# Patient Record
Sex: FEMALE | Race: WHITE | NOT HISPANIC OR LATINO | Employment: OTHER | URBAN - METROPOLITAN AREA
[De-identification: names, ages, dates, MRNs, and addresses within clinical notes are randomized per-mention and may not be internally consistent; named-entity substitution may affect disease eponyms.]

---

## 2017-04-17 ENCOUNTER — HISTORICAL (OUTPATIENT)
Dept: ADMINISTRATIVE | Facility: HOSPITAL | Age: 37
End: 2017-04-17

## 2017-04-17 LAB
% SATURATION: 12 %
ALBUMIN SERPL-MCNC: 3.8 G/DL (ref 3.1–4.7)
ALP SERPL-CCNC: 45 IU/L (ref 40–104)
ALT (SGPT): 12 IU/L (ref 3–33)
AST SERPL-CCNC: 18 IU/L (ref 10–40)
BASOPHILS NFR BLD: 0 K/UL (ref 0–0.2)
BASOPHILS NFR BLD: 0.8 %
BILIRUB SERPL-MCNC: 0.4 MG/DL (ref 0.3–1)
BUN SERPL-MCNC: 11 MG/DL (ref 8–20)
CALCIUM SERPL-MCNC: 9 MG/DL (ref 7.7–10.4)
CHLORIDE: 108 MMOL/L (ref 98–110)
CO2 SERPL-SCNC: 24.7 MMOL/L (ref 22.8–31.6)
CREATININE: 0.82 MG/DL (ref 0.6–1.4)
EOSINOPHIL NFR BLD: 0 K/UL (ref 0–0.7)
EOSINOPHIL NFR BLD: 1.1 %
ERYTHROCYTE [DISTWIDTH] IN BLOOD BY AUTOMATED COUNT: 16 % (ref 12.5–14.5)
FERRITIN SERPL-MCNC: 3 NG/ML (ref 24–162)
FOLATE SERPL-MCNC: 13.6 NG/ML (ref 2.2–11.2)
GLUCOSE: 94 MG/DL (ref 70–99)
GRAN #: 1.4 K/UL (ref 1.4–6.5)
GRAN%: 38.4 %
HCT VFR BLD AUTO: 40.5 % (ref 36–48)
HGB BLD-MCNC: 12.9 G/DL (ref 12–15)
IMMATURE GRANS (ABS): 0 K/UL (ref 0–1)
IMMATURE GRANULOCYTES: 0 %
IRON: 45 MCG/DL (ref 24–162)
LYMPH #: 1.8 K/UL (ref 1.2–3.4)
LYMPH%: 48.5 %
MCH RBC QN AUTO: 27.9 PG (ref 25–35)
MCHC RBC AUTO-ENTMCNC: 31.9 G/DL (ref 31–36)
MCV RBC AUTO: 87.5 FL (ref 79–98)
MONO #: 0.4 K/UL (ref 0.1–0.6)
MONO%: 11.2 %
NUCLEATED RBCS: 0 %
NUCLEATED RED BLOOD CELLS: 0 /100 WBC
PERFORMED BY:: ABNORMAL
PLATELET # BLD AUTO: 258 K/UL (ref 140–440)
PMV BLD AUTO: 8.9 FL (ref 8.8–12.7)
POTASSIUM SERPL-SCNC: 4.9 MMOL/L (ref 3.5–5)
PROT SERPL-MCNC: 7.1 G/DL (ref 6–8.2)
RBC # BLD AUTO: 4.63 M/UL (ref 3.5–5.5)
SODIUM: 140 MMOL/L (ref 134–144)
TOTAL IRON BINDING CAPACITY: 371 MCG/DL (ref 177–435)
VITAMIN B12: >1500 PG/ML (ref 62–940)
VITAMIN D, 1,25 (OH)2: 68.8 NG/ML (ref 30–100)
WBC # BLD: 3.7 K/UL (ref 5–10)

## 2017-08-10 ENCOUNTER — OFFICE VISIT (OUTPATIENT)
Dept: PULMONOLOGY | Facility: CLINIC | Age: 37
End: 2017-08-10
Payer: MEDICAID

## 2017-08-10 VITALS
WEIGHT: 106 LBS | DIASTOLIC BLOOD PRESSURE: 64 MMHG | BODY MASS INDEX: 17.66 KG/M2 | HEART RATE: 89 BPM | HEIGHT: 65 IN | SYSTOLIC BLOOD PRESSURE: 92 MMHG | OXYGEN SATURATION: 95 %

## 2017-08-10 DIAGNOSIS — R06.09 DYSPNEA ON EXERTION: ICD-10-CM

## 2017-08-10 DIAGNOSIS — I26.99 OTHER PULMONARY EMBOLISM WITHOUT ACUTE COR PULMONALE, UNSPECIFIED CHRONICITY: Primary | ICD-10-CM

## 2017-08-10 DIAGNOSIS — R68.3 CLUBBING OF FINGERS: ICD-10-CM

## 2017-08-10 DIAGNOSIS — E46 MALNUTRITION: ICD-10-CM

## 2017-08-10 PROBLEM — N80.9 ENDOMETRIOSIS: Status: ACTIVE | Noted: 2017-08-10

## 2017-08-10 PROBLEM — I05.9 MITRAL VALVE DISORDER: Status: ACTIVE | Noted: 2017-08-10

## 2017-08-10 PROBLEM — K85.90 PANCREATITIS: Status: ACTIVE | Noted: 2017-08-10

## 2017-08-10 PROBLEM — R10.9 CHRONIC ABDOMINAL PAIN: Status: ACTIVE | Noted: 2017-08-10

## 2017-08-10 PROBLEM — D50.9 IRON DEFICIENCY ANEMIA: Status: ACTIVE | Noted: 2017-08-10

## 2017-08-10 PROBLEM — M81.0 OP (OSTEOPOROSIS): Status: ACTIVE | Noted: 2017-08-10

## 2017-08-10 PROBLEM — Z78.9 NON-SMOKER: Status: ACTIVE | Noted: 2017-08-10

## 2017-08-10 PROBLEM — G89.29 CHRONIC ABDOMINAL PAIN: Status: ACTIVE | Noted: 2017-08-10

## 2017-08-10 LAB
ALBUMIN SERPL-MCNC: 3.8 G/DL (ref 3.1–4.7)
ALP SERPL-CCNC: 37 IU/L (ref 40–104)
ALT (SGPT): 15 IU/L (ref 3–33)
AST SERPL-CCNC: 17 IU/L (ref 10–40)
BASOPHILS NFR BLD: 0 K/UL (ref 0–0.2)
BASOPHILS NFR BLD: 0.6 %
BILIRUB SERPL-MCNC: 0.8 MG/DL (ref 0.3–1)
BUN SERPL-MCNC: 12 MG/DL (ref 8–20)
CALCIUM SERPL-MCNC: 8.8 MG/DL (ref 7.7–10.4)
CHLORIDE: 105 MMOL/L (ref 98–110)
CO2 SERPL-SCNC: 24.4 MMOL/L (ref 22.8–31.6)
CREATININE: 0.85 MG/DL (ref 0.6–1.4)
EOSINOPHIL NFR BLD: 0 K/UL (ref 0–0.7)
EOSINOPHIL NFR BLD: 0.4 %
ERYTHROCYTE [DISTWIDTH] IN BLOOD BY AUTOMATED COUNT: 14 % (ref 11.7–14.9)
GLUCOSE: 97 MG/DL (ref 70–99)
GRAN #: 2.4 K/UL (ref 1.4–6.5)
GRAN%: 50 %
HCT VFR BLD AUTO: 39.1 % (ref 36–48)
HGB BLD-MCNC: 13.1 G/DL (ref 12–15)
IMMATURE GRANS (ABS): 0 K/UL (ref 0–1)
IMMATURE GRANULOCYTES: 0.2 %
LYMPH #: 2 K/UL (ref 1.2–3.4)
LYMPH%: 40.5 %
MCH RBC QN AUTO: 30.1 PG (ref 25–35)
MCHC RBC AUTO-ENTMCNC: 33.5 G/DL (ref 31–36)
MCV RBC AUTO: 89.9 FL (ref 79–98)
MONO #: 0.4 K/UL (ref 0.1–0.6)
MONO%: 8.3 %
NUCLEATED RBCS: 0 %
PLATELET # BLD AUTO: 228 K/UL (ref 140–440)
PMV BLD AUTO: 9.9 FL (ref 8.8–12.7)
POTASSIUM SERPL-SCNC: 3.7 MMOL/L (ref 3.5–5)
PROT SERPL-MCNC: 6.9 G/DL (ref 6–8.2)
RBC # BLD AUTO: 4.35 M/UL (ref 3.5–5.5)
SODIUM: 137 MMOL/L (ref 134–144)
VITAMIN D, 1,25 (OH)2: 75.4 NG/ML (ref 30–100)
WBC # BLD AUTO: 4.8 K/UL (ref 5–10)

## 2017-08-10 PROCEDURE — 3008F BODY MASS INDEX DOCD: CPT | Mod: ,,, | Performed by: INTERNAL MEDICINE

## 2017-08-10 PROCEDURE — 99204 OFFICE O/P NEW MOD 45 MIN: CPT | Mod: ,,, | Performed by: INTERNAL MEDICINE

## 2017-08-10 RX ORDER — TRAZODONE HYDROCHLORIDE 150 MG/1
300 TABLET ORAL NIGHTLY
Refills: 0 | COMMUNITY
Start: 2017-07-10 | End: 2021-05-12

## 2017-08-10 RX ORDER — NORETHINDRONE ACETATE AND ETHINYL ESTRADIOL AND FERROUS FUMARATE 1.5-30(21)
KIT ORAL
Refills: 12 | COMMUNITY
Start: 2017-07-07 | End: 2018-05-10 | Stop reason: ALTCHOICE

## 2017-08-10 RX ORDER — APIXABAN 5 MG/1
TABLET, FILM COATED ORAL
Refills: 2 | COMMUNITY
Start: 2017-07-16 | End: 2019-05-02 | Stop reason: SDUPTHER

## 2017-08-10 NOTE — PROGRESS NOTES
New Office Visit/Consultation Note    Patient Name: Janeth Siddiqi  MRN: [id number]  : 1980      Reason for visit: Dyspnea, chest pain, PE, clubbing    HPI:     38 yo female with long standing  Medical issues including malabsorption (h/o vit D deficiency, fat malabsorption, B12 deficiency), recurrent bowel obstruction (has had 2 surgeries), had PE about 1 year ago (had protein C deficiency, heterozygote for MTHFR), + clubbing (started in her teenage years now worse).  Here for evaluation of some persistent dyspnea and some right sided chest pain.  ROS as below.    Past Medical History  Past Medical History:   Diagnosis Date    Anxiety     Bipolar disorder     Eating disorder     Pulmonary embolism        Past Surgical History  Past Surgical History:   Procedure Laterality Date    APPENDECTOMY      bowel obstruction      lysis of adhesions      OVARIAN CYST REMOVAL      uterine polyp removal         Medications  Current Outpatient Prescriptions on File Prior to Visit   Medication Sig Dispense Refill    cyanocobalamin, vitamin B-12, (B-12 COMPLIANCE) 1,000 mcg/mL Kit Inject 1,000 mcg as directed every 28 days.      ferrous sulfate 324 mg (65 mg iron) TbEC Take 325 mg by mouth once daily.      fluoxetine (PROZAC) 20 MG capsule Take 20 mg by mouth once daily.      lubiprostone (AMITIZA) 24 MCG Cap Take 24 mcg by mouth daily with breakfast.      multivitamin capsule Take 1 capsule by mouth once daily.      promethazine (PHENERGAN) 25 MG suppository Place 1 suppository (25 mg total) rectally every 6 (six) hours as needed for Nausea. 10 suppository 0    topiramate (TOPAMAX) 100 MG tablet Take 1 tablet by mouth 3 (three) times daily.  1    [DISCONTINUED] LOW-OGESTREL, 28, 0.3-30 mg-mcg per tablet Take 1 tablet by mouth once daily.  5    [DISCONTINUED] docusate sodium (COLACE) 100 MG capsule Take 100 mg by mouth 2 (two) times daily.      [DISCONTINUED] promethazine (PHENERGAN) 25 MG tablet Take 1  "tablet (25 mg total) by mouth every 6 (six) hours as needed for Nausea. 15 tablet 0     No current facility-administered medications on file prior to visit.        Allergies  Review of patient's allergies indicates:   Allergen Reactions    Iron analogues Anaphylaxis     Infusion only. Tolerates po    Bactrim [sulfamethoxazole-trimethoprim] Swelling    Lidocaine Swelling    Nsaids (non-steroidal anti-inflammatory drug)      cannot take NSAIDs- upsets the stomach    Zofran [ondansetron hcl (pf)] Nausea Only       Review of Systems  Review of Systems   Constitutional: Positive for malaise/fatigue and weight loss. Negative for chills, diaphoresis and fever.   HENT: Negative for congestion.    Eyes: Negative for pain.   Respiratory: Positive for cough and shortness of breath. Negative for hemoptysis, sputum production, wheezing and stridor.         + clubbing   Cardiovascular: Positive for chest pain. Negative for palpitations, orthopnea, claudication, leg swelling and PND.   Gastrointestinal: Positive for abdominal pain, constipation and diarrhea. Negative for heartburn, nausea and vomiting.        Malabsorption   Genitourinary: Negative for dysuria, frequency and urgency.   Musculoskeletal: Negative for falls and myalgias.   Skin: Negative for itching and rash.   Neurological: Positive for weakness. Negative for sensory change and focal weakness.   Psychiatric/Behavioral: Positive for depression. The patient is not nervous/anxious.        Physical Exam    Vitals:    08/10/17 1340   BP: 92/64   Pulse: 89   SpO2: 95%   Weight: 48.1 kg (106 lb)   Height: 5' 5" (1.651 m)       Physical Exam   Constitutional: She is oriented to person, place, and time. She appears well-developed. No distress.   Thin, chronically ill appearing   HENT:   Head: Normocephalic and atraumatic.   Right Ear: External ear normal.   Left Ear: External ear normal.   Nose: Nose normal.   Mouth/Throat: Oropharynx is clear and moist.   Eyes: " Conjunctivae and EOM are normal. Pupils are equal, round, and reactive to light.   Neck: Normal range of motion. Neck supple. No JVD present. No tracheal deviation present. No thyromegaly present.   Cardiovascular: Normal rate, regular rhythm, normal heart sounds and intact distal pulses.  Exam reveals no gallop and no friction rub.    No murmur heard.  Pulmonary/Chest: Effort normal and breath sounds normal. No stridor. No respiratory distress. She has no wheezes. She has no rales. She exhibits no tenderness.   Abdominal: Soft. Bowel sounds are normal. She exhibits no distension. There is no tenderness.   scaphoid   Musculoskeletal: Normal range of motion. She exhibits no edema or tenderness.   + clubbing   Lymphadenopathy:     She has no cervical adenopathy.   Neurological: She is alert and oriented to person, place, and time. No cranial nerve deficit.   Skin: Skin is warm and dry. She is not diaphoretic.   Psychiatric: She has a normal mood and affect. Her behavior is normal.   Nursing note and vitals reviewed.      Labs      Xrays    Imaging Results    None         Impression/Plan    Problem List Items Addressed This Visit        Hematology    Pulmonary embolism - Primary  - has protein C deficiency  - continue Eliquis  - d/w Dr Hurst  - heterozygote for MTHFR    Relevant Orders    CT Chest Without Contrast       Endocrine    Malnutrition  - has h/o malabsorption of fats with vitamin deficiencies  - check labs  - check vit ADEK    Relevant Orders    Comprehensive metabolic panel    Vitamin D    Vitamin A, E, Beta Carotene    Vitamin K       Other    Clubbing of fingers  - check PFT  - recheck CT scan  - check cystic fibrosis screen    Relevant Orders    Cystic Fibrosis Mutation Panel    Dyspnea on exertion  - as above  - RTC 1 month    Relevant Orders    Complete PFT with bronchodilator    CBC auto differential       I am concerned that she has a more systemic problem, will start with workup as above and  move forward from there.  This was discussed with her and her family at length and all questions answered.   Other Visit Diagnoses    None.         Time spent with patient: 45 minutes

## 2017-08-14 ENCOUNTER — HISTORICAL (OUTPATIENT)
Dept: ADMINISTRATIVE | Facility: HOSPITAL | Age: 37
End: 2017-08-14

## 2017-08-14 ENCOUNTER — TELEPHONE (OUTPATIENT)
Dept: HEMATOLOGY/ONCOLOGY | Facility: CLINIC | Age: 37
End: 2017-08-14

## 2017-08-14 DIAGNOSIS — D50.9 IRON DEFICIENCY ANEMIA, UNSPECIFIED IRON DEFICIENCY ANEMIA TYPE: Primary | ICD-10-CM

## 2017-08-14 LAB
ALBUMIN SERPL-MCNC: 3.6 G/DL (ref 3.1–4.7)
ALP SERPL-CCNC: 32 IU/L (ref 40–104)
ALT (SGPT): 14 IU/L (ref 3–33)
AST SERPL-CCNC: 17 IU/L (ref 10–40)
BASOPHILS NFR BLD: 0 K/UL (ref 0–0.2)
BASOPHILS NFR BLD: 0.5 %
BILIRUB SERPL-MCNC: 0.7 MG/DL (ref 0.3–1)
BUN SERPL-MCNC: 11 MG/DL (ref 8–20)
CALCIUM SERPL-MCNC: 8.4 MG/DL (ref 7.7–10.4)
CHLORIDE: 106 MMOL/L (ref 98–110)
CO2 SERPL-SCNC: 26.2 MMOL/L (ref 22.8–31.6)
CREATININE: 0.75 MG/DL (ref 0.6–1.4)
EOSINOPHIL NFR BLD: 0 K/UL (ref 0–0.7)
EOSINOPHIL NFR BLD: 0.5 %
ERYTHROCYTE [DISTWIDTH] IN BLOOD BY AUTOMATED COUNT: 13.9 % (ref 12.5–14.5)
FERRITIN SERPL-MCNC: 19 NG/ML (ref 24–162)
FOLATE SERPL-MCNC: 21.4 NG/ML (ref 2.2–11.2)
GLUCOSE: 89 MG/DL (ref 70–99)
GRAN #: 2.2 K/UL (ref 1.4–6.5)
GRAN%: 49.7 %
HCT VFR BLD AUTO: 36.6 % (ref 36–48)
HGB BLD-MCNC: 12.3 G/DL (ref 12–15)
IMMATURE GRANS (ABS): 0 K/UL (ref 0–1)
IMMATURE GRANULOCYTES: 0.2 %
LYMPH #: 1.8 K/UL (ref 1.2–3.4)
LYMPH%: 40.2 %
MCH RBC QN AUTO: 29.9 PG (ref 25–35)
MCHC RBC AUTO-ENTMCNC: 33.6 G/DL (ref 31–36)
MCV RBC AUTO: 88.8 FL (ref 79–98)
MONO #: 0.4 K/UL (ref 0.1–0.6)
MONO%: 8.9 %
NUCLEATED RBCS: 0 %
NUCLEATED RED BLOOD CELLS: 0 /100 WBC
PERFORMED BY:: ABNORMAL
PLATELET # BLD AUTO: 206 K/UL (ref 140–440)
PMV BLD AUTO: 9.4 FL (ref 8.8–12.7)
POTASSIUM SERPL-SCNC: 3.8 MMOL/L (ref 3.5–5)
PROT SERPL-MCNC: 6.7 G/DL (ref 6–8.2)
RBC # BLD AUTO: 4.12 M/UL (ref 3.5–5.5)
SODIUM: 138 MMOL/L (ref 134–144)
VITAMIN B12: 893 PG/ML (ref 62–940)
VITAMIN D, 1,25 (OH)2: 72.3 NG/ML (ref 30–100)
WBC # BLD: 4.4 K/UL (ref 5–10)

## 2017-08-14 NOTE — TELEPHONE ENCOUNTER
rtn call to pt left message on VM to call clinic with any questions or concerns. (requested orders for labs but looks like labs have already been drawn) would like for pt to call back if we need orders to be faxed.

## 2017-08-15 ENCOUNTER — OFFICE VISIT (OUTPATIENT)
Dept: HEMATOLOGY/ONCOLOGY | Facility: CLINIC | Age: 37
End: 2017-08-15
Payer: MEDICAID

## 2017-08-15 VITALS
HEIGHT: 65 IN | TEMPERATURE: 99 F | RESPIRATION RATE: 18 BRPM | BODY MASS INDEX: 17.83 KG/M2 | SYSTOLIC BLOOD PRESSURE: 106 MMHG | DIASTOLIC BLOOD PRESSURE: 73 MMHG | WEIGHT: 107 LBS | HEART RATE: 73 BPM

## 2017-08-15 DIAGNOSIS — D50.9 IRON DEFICIENCY ANEMIA, UNSPECIFIED IRON DEFICIENCY ANEMIA TYPE: ICD-10-CM

## 2017-08-15 DIAGNOSIS — I26.99 OTHER PULMONARY EMBOLISM WITHOUT ACUTE COR PULMONALE, UNSPECIFIED CHRONICITY: Primary | ICD-10-CM

## 2017-08-15 DIAGNOSIS — J47.9 BRONCHIECTASIS WITHOUT COMPLICATION: Primary | ICD-10-CM

## 2017-08-15 DIAGNOSIS — E53.8 B12 DEFICIENCY: ICD-10-CM

## 2017-08-15 DIAGNOSIS — E55.9 VITAMIN D DEFICIENCY: ICD-10-CM

## 2017-08-15 DIAGNOSIS — D68.59 ANTITHROMBIN III DEFICIENCY: ICD-10-CM

## 2017-08-15 DIAGNOSIS — Z15.89 MTHFR MUTATION: ICD-10-CM

## 2017-08-15 PROCEDURE — 99213 OFFICE O/P EST LOW 20 MIN: CPT | Mod: ,,, | Performed by: INTERNAL MEDICINE

## 2017-08-15 PROCEDURE — 3008F BODY MASS INDEX DOCD: CPT | Mod: ,,, | Performed by: INTERNAL MEDICINE

## 2017-08-15 NOTE — PROGRESS NOTES
[facility] Hematology/Oncology  PROGRESS NOTE      Subjective:       Patient ID:   NAME: Janeth Siddiqi : 1980     37 y.o. female    Referring Doc: Millicent Garcia  Other Physicians: Francoise Noriega    Chief Complaint:  Follow-up and Results (labs in epic)      History of Present Illness:     Patient returns today for a regularly scheduled follow-up visit.  The patient is here with her mom. She needs to establish with a new PCP since Dr Garcia moved to [facility]. She denies any CP, SOB, HA's or N/V.             ROS:   GEN: normal without any fever, night sweats or weight loss  HEENT: normal with no HA's, sore throat, stiff neck, changes in vision  CV: normal with no CP, SOB, PND, KRAUSE or orthopnea  PULM: normal with no SOB, cough, hemoptysis, sputum or pleuritic pain  GI: normal with no abdominal pain, nausea, vomiting, constipation, diarrhea, melanotic stools, BRBPR, or hematemesis  : normal with no hematuria, dysuria  BREAST: normal with no mass, discharge, pain  SKIN: normal with no rash, erythema, bruising, or swelling    Allergies:  Review of patient's allergies indicates:   Allergen Reactions    Iron analogues Anaphylaxis     Infusion only. Tolerates po    Bactrim [sulfamethoxazole-trimethoprim] Swelling    Lidocaine Swelling    Nsaids (non-steroidal anti-inflammatory drug)      cannot take NSAIDs- upsets the stomach    Zofran [ondansetron hcl (pf)] Nausea Only       Medications:    Current Outpatient Prescriptions:     cyanocobalamin, vitamin B-12, (B-12 COMPLIANCE) 1,000 mcg/mL Kit, Inject 1,000 mcg as directed every 28 days., Disp: , Rfl:     ELIQUIS 5 mg Tab, TK 1 T PO  BID, Disp: , Rfl: 2    ferrous sulfate 324 mg (65 mg iron) TbEC, Take 325 mg by mouth once daily., Disp: , Rfl:     fluoxetine (PROZAC) 20 MG capsule, Take 20 mg by mouth once daily., Disp: , Rfl:     lubiprostone (AMITIZA) 24 MCG Cap, Take 24 mcg by mouth daily with breakfast., Disp: , Rfl:     MICROGESTIN FE 1.,  "28, 1.5 mg-30 mcg (21)/75 mg (7) tablet, TK 1 T PO QD, Disp: , Rfl: 12    multivitamin capsule, Take 1 capsule by mouth once daily., Disp: , Rfl:     promethazine (PHENERGAN) 25 MG suppository, Place 1 suppository (25 mg total) rectally every 6 (six) hours as needed for Nausea., Disp: 10 suppository, Rfl: 0    topiramate (TOPAMAX) 100 MG tablet, Take 1 tablet by mouth 3 (three) times daily., Disp: , Rfl: 1    trazodone (DESYREL) 150 MG tablet, TAKE 2 TS PO QD, Disp: , Rfl: 0    PMHx/PSHx Updates:  See patient's last visit with me on 5/10/17  See H&P on 12/7/16        Pathology:  No matching staging information was found for the patient.          Objective:     Vitals:  Blood pressure 106/73, pulse 73, temperature 98.6 °F (37 °C), resp. rate 18, height 5' 5" (1.651 m), weight 48.5 kg (107 lb).    Physical Examination:   GEN: no apparent distress, comfortable; AAOx3  HEAD: atraumatic and normocephalic  EYES: no pallor, no icterus, PERRLA  ENT: OMM, no pharyngeal erythema, external ears WNL; no nasal discharge; no thrush  NECK: no masses, thyroid normal, trachea midline, no LAD/LN's, supple  CV: RRR with no murmur; normal pulse; normal S1 and S2; no pedal edema  CHEST: Normal respiratory effort; CTAB; normal breath sounds; no wheeze or crackles  ABDOM: nontender and nondistended; soft; normal bowel sounds; no rebound/guarding  MUSC/Skeletal: ROM normal; no crepitus; joints normal; no deformities or arthropathy  EXTREM: no clubbing, cyanosis, inflammation or swelling  SKIN: no rashes, lesions, ulcers, petechiae or subcutaneous nodules  : no reza  NEURO: grossly intact; motor/sensory WNL; AAOx3; no tremors  PSYCH: normal mood, affect and behavior  LYMPH: normal cervical, supraclavicular, axillary and groin LN's            Labs:     8/14/2017      Radiology/Diagnostic Studies:    X-ray Abdomen Flat And Erect    Result Date: 7/21/2017  EXAM: KUB. INDICATION: Obstruction. COMPARISON: KUB 1/1/14. FINDINGS: Supine and " upright radiographs of the abdomen demonstrate a nonobstructive bowel gas pattern. There is no free air. There is no pneumatosis. There is no portal venous gas. There is calcification. Lung bases are clear. There is no osseous abnormality.    1.Nonobstructed bowel gas pattern. Electronically signed by: KELBY DENNIS MD Date:     07/21/17 Time:    13:49     Ct Abdomen Pelvis With Contrast    Result Date: 7/21/2017  Procedure: CT of the abdomen and pelvis with IV contrast. Technique: Axial images of the abdomen and pelvis were obtained with intravenous contrast administration. Coronal and sagittal reconstructions were provided. Total DLP was 691 mGy-cm.  Dose lowering technique, automated exposure control, was utilized for this exam. History: Left-sided abdominal pain and vomiting. Comparison: CT of the abdomen and pelvis 7/31/2015. Findings: The bilateral lung bases are clear. The heart is normal in size. There is a 7 mm focus of arterial enhancement within segment 4B of the liver which is isodense to the adjacent liver on delayed phase. This most consistent with a flash filling hemangioma. The portal vein is patent. The spleen, pancreas, and adrenal glands are normal. Bilateral kidneys are normal. There is no hydronephrosis or nephrolithiasis. The stomach and small bowel are decompressed. The appendix is not visualized, however there is no right lower quadrant inflammatory stranding. The colon is normal. The uterus and adnexa are normal for age. Urinary bladder is normal. There is trace physiologic free fluid in the lower pelvis. There is no pelvic or retroperitoneal adenopathy. The abdominal aorta is non-aneurysmal. There is no lytic or blastic osseous lesions.     No acute abnormality of the abdomen and pelvis. Electronically signed by: KELBY DENNIS MD Date:     07/21/17 Time:    15:37       I have reviewed all available lab results and radiology reports.    Assessment/Plan:   (1) 37 y.o. female with diagnosis of iron  deficiency anemia and B12 deficiency  - she was previously on oral iron and monthly B12  - latest ferritin is 19  - B12 893    (2) prior Pulmonary emboli with prior use of OCP's  - on oral anticoagulant drug Eliquis  - underlying MTHFR-A heterozygous and ATIII deficiency issues  - followed by Dr Mccray with Pulm and he order several tests and is doing workup for cystic fibrosis    (3) Crohn's disorder/pancreatitis - followed by Dr Noriega with GI    (4) Chronic GYN bleeding - followed by Dr Gigi Oquendo with GYN  - latest hgb wnl  - she is on OCP's still and thus she needs to probably remain on the blood thinners    (5) prior Vit D deficiency    (6) She is being seen by Dr Khoobehi with plastics in [place] for potential Voluma skin filler; though I can not advocate her coming off the blood thinners; if she is to have the procedure, she will need to hold the Eliquis for two days before        PLAN:  1. Proceed with Dr Mccray's workup and f/u  2. F/u with PCP , GYn, and pulm  3. RTC  In 4 months  Fax note to Schuette, Striplin, Bulat, Khoobehi    Discussion:     I have explained all of the above in detail and the patient understands all of the current recommendation(s). I have answered all of their questions to the best of my ability and to their complete satisfaction.   The patient is to continue with the current management plan.            Electronically signed by Miguel Hurst MD

## 2017-09-13 ENCOUNTER — OFFICE VISIT (OUTPATIENT)
Dept: PULMONOLOGY | Facility: CLINIC | Age: 37
End: 2017-09-13
Payer: MEDICAID

## 2017-09-13 VITALS — DIASTOLIC BLOOD PRESSURE: 60 MMHG | OXYGEN SATURATION: 99 % | SYSTOLIC BLOOD PRESSURE: 92 MMHG | HEART RATE: 62 BPM

## 2017-09-13 DIAGNOSIS — D68.59 ANTITHROMBIN III DEFICIENCY: ICD-10-CM

## 2017-09-13 DIAGNOSIS — I26.99 OTHER PULMONARY EMBOLISM WITHOUT ACUTE COR PULMONALE, UNSPECIFIED CHRONICITY: Primary | ICD-10-CM

## 2017-09-13 DIAGNOSIS — Z78.9 NON-SMOKER: ICD-10-CM

## 2017-09-13 DIAGNOSIS — R06.09 DYSPNEA ON EXERTION: ICD-10-CM

## 2017-09-13 DIAGNOSIS — E46 MALNUTRITION: ICD-10-CM

## 2017-09-13 DIAGNOSIS — R68.3 CLUBBING OF FINGERS: ICD-10-CM

## 2017-09-13 PROCEDURE — 3008F BODY MASS INDEX DOCD: CPT | Mod: ,,, | Performed by: INTERNAL MEDICINE

## 2017-09-13 PROCEDURE — 99214 OFFICE O/P EST MOD 30 MIN: CPT | Mod: ,,, | Performed by: INTERNAL MEDICINE

## 2017-09-13 RX ORDER — ERGOCALCIFEROL 1.25 MG/1
50000 CAPSULE ORAL
Qty: 4 CAPSULE | Refills: 12 | Status: SHIPPED | OUTPATIENT
Start: 2017-09-13 | End: 2018-09-13 | Stop reason: SDUPTHER

## 2017-09-13 NOTE — PROGRESS NOTES
Office Visit    HPI:    Here for follow up, no new complaints, have reviewed numerous labs with pt and mother, PFT show moderate chest restriction and mild decrease in DLCO, CT chest shows no significant bronchiectasis but has ectasia of the ascending aorta (stable),  CF studies still pending (will call pt with results when available).  All questions answered.    Medications      Current Outpatient Prescriptions:     cyanocobalamin, vitamin B-12, (B-12 COMPLIANCE) 1,000 mcg/mL Kit, Inject 1,000 mcg as directed every 28 days., Disp: , Rfl:     ELIQUIS 5 mg Tab, TK 1 T PO  BID, Disp: , Rfl: 2    ferrous sulfate 324 mg (65 mg iron) TbEC, Take 325 mg by mouth once daily., Disp: , Rfl:     fluoxetine (PROZAC) 20 MG capsule, Take 20 mg by mouth once daily., Disp: , Rfl:     lubiprostone (AMITIZA) 24 MCG Cap, Take 24 mcg by mouth daily with breakfast., Disp: , Rfl:     MICROGESTIN FE 1.5/30, 28, 1.5 mg-30 mcg (21)/75 mg (7) tablet, TK 1 T PO QD, Disp: , Rfl: 12    multivitamin capsule, Take 1 capsule by mouth once daily., Disp: , Rfl:     promethazine (PHENERGAN) 25 MG suppository, Place 1 suppository (25 mg total) rectally every 6 (six) hours as needed for Nausea., Disp: 10 suppository, Rfl: 0    topiramate (TOPAMAX) 100 MG tablet, Take 1 tablet by mouth 3 (three) times daily., Disp: , Rfl: 1    trazodone (DESYREL) 150 MG tablet, TAKE 2 TS PO QD, Disp: , Rfl: 0    ergocalciferol (ERGOCALCIFEROL) 50,000 unit Cap, Take 1 capsule (50,000 Units total) by mouth every 7 days., Disp: 4 capsule, Rfl: 12    Allergies    Review of patient's allergies indicates:   Allergen Reactions    Iron analogues Anaphylaxis     Infusion only. Tolerates po    Bactrim [sulfamethoxazole-trimethoprim] Swelling    Lidocaine Swelling    Nsaids (non-steroidal anti-inflammatory drug)      cannot take NSAIDs- upsets the stomach    Zofran [ondansetron hcl (pf)] Nausea Only       Social History    History   Smoking Status    Never Smoker    Smokeless Tobacco    Never Used       ETOH - 0 drinks per week.    Drug Use - 0    Occupation - not working    Family History    Family History   Problem Relation Age of Onset    Breast cancer Mother 58    Colon cancer Maternal Grandmother 62    Prostate cancer Maternal Grandfather        ROS  Review of Systems   Constitutional: Positive for malaise/fatigue. Negative for chills, diaphoresis, fever and weight loss.   HENT: Negative for congestion.    Eyes: Negative for pain.   Respiratory: Negative for cough, hemoptysis, sputum production, shortness of breath, wheezing and stridor.    Cardiovascular: Negative for chest pain, palpitations, orthopnea, claudication, leg swelling and PND.   Gastrointestinal: Positive for constipation and nausea. Negative for abdominal pain, diarrhea, heartburn and vomiting.        Bloating   Genitourinary: Negative for dysuria, frequency and urgency.   Musculoskeletal: Negative for falls and myalgias.   Neurological: Negative for sensory change, focal weakness and weakness.   Psychiatric/Behavioral: Negative for depression. The patient is not nervous/anxious.        Physical Exam    Vitals:    09/13/17 1400   BP: 92/60   Pulse: 62   SpO2: 99%       Physical Exam   Constitutional: She is oriented to person, place, and time. She appears well-developed and well-nourished. No distress.   HENT:   Head: Normocephalic and atraumatic.   Nose: Nose normal.   Mouth/Throat: Oropharynx is clear and moist.   Eyes: Conjunctivae and EOM are normal. Pupils are equal, round, and reactive to light.   Neck: Normal range of motion. Neck supple. No JVD present. No tracheal deviation present. No thyromegaly present.   Cardiovascular: Normal rate, regular rhythm, normal heart sounds and intact distal pulses.  Exam reveals no gallop and no friction rub.    No murmur heard.  Pulmonary/Chest: Effort normal and breath sounds normal. No stridor. No respiratory distress. She has no wheezes. She has no rales.  She exhibits no tenderness.   Abdominal: Soft. Bowel sounds are normal. She exhibits no distension. There is no tenderness.   Musculoskeletal: Normal range of motion. She exhibits no edema or tenderness.   + clubbing   Lymphadenopathy:     She has no cervical adenopathy.   Neurological: She is alert and oriented to person, place, and time. No cranial nerve deficit.   Skin: Skin is warm and dry. She is not diaphoretic.   Psychiatric: She has a normal mood and affect. Her behavior is normal.   Nursing note and vitals reviewed.      Lab    @RESUFAST (WBC,HGB,HCT,PLT)@    Sodium   Date Value Ref Range Status   08/14/2017 138 134 - 144 mmol/L      Potassium   Date Value Ref Range Status   08/14/2017 3.8 3.5 - 5.0 mmol/L      Chloride   Date Value Ref Range Status   08/14/2017 106 98 - 110 mmol/L      CO2   Date Value Ref Range Status   08/14/2017 26.2 22.8 - 31.6 mmol/L      Glucose   Date Value Ref Range Status   08/14/2017 89 70 - 99 mg/dL      BUN, Bld   Date Value Ref Range Status   08/14/2017 11 8 - 20 mg/dL      Creatinine   Date Value Ref Range Status   08/14/2017 0.75 0.60 - 1.40 mg/dL      Calcium   Date Value Ref Range Status   08/14/2017 8.4 7.7 - 10.4 mg/dL      Total Protein   Date Value Ref Range Status   08/14/2017 6.7 6.0 - 8.2 g/dL      Albumin   Date Value Ref Range Status   08/14/2017 3.6 3.1 - 4.7 g/dL      Total Bilirubin   Date Value Ref Range Status   08/14/2017 0.7 0.3 - 1.0 mg/dL      Alkaline Phosphatase   Date Value Ref Range Status   08/14/2017 32 (L) 40 - 104 IU/L      AST   Date Value Ref Range Status   08/14/2017 17 10 - 40 IU/L      ALT   Date Value Ref Range Status   07/21/2017 21 10 - 44 U/L Final     Anion Gap   Date Value Ref Range Status   07/21/2017 7 (L) 8 - 16 mmol/L Final         Xray    CMS MANDATED QUALITY DATA - CT RADIATION ? 436    All CT scans at this facility utilize dose modulation, iterative reconstruction,  and/or weight based dosing when appropriate to reduce radiation  dose to as low  as reasonably achievable.    CLINICAL HISTORY:  37 years (1980) Female R06.09/I26.99 patient has a history of shortness of  breath and cystic fibrosis.    TECHNIQUE:  CHEST W/O CONTRAST CT. 577 images obtained. CT scan of the chest was performed  without intravenous contrast.    CONTRAST:  No IV contrast was administered    COMPARISON:  Radiograph the chest most recently from 12/06/2016, and CT of the chest from  08/21/2016    FINDINGS:  Evaluation of the solid organs is limited without intravenous contrast.    Visualized neck:The thyroid gland appears normal.  Lungs: The lungs are clear.  Airway: The trachea and central bronchial tree appear normal. There is no  significant bronchiectasis or bronchial wall thickening.  Pleura: There is no pleural effusion. There is no pneumothorax.  Cardiovascular: The heart is normal in size. There is no pericardial effusion.  The thoracic aorta is ectatic, measuring  3.2 cm in diameter, unchanged from  the previous exam  Mediastinum: There is no supraclavicular, axillary, mediastinal, or hilar  lymphadenopathy. The esophagus appears grossly normal.  Soft tissues: The peripheral soft tissues appear normal.  Musculoskeletal: The visualized osseous structures appear normal.  There are no  suspicious osseous lesions.  Upper Abdomen: The visualized upper abdominal organs appear normal.    IMPRESSION:  1. Mild ectasia of the ascending aorta unchanged from the previous exam  measuring up to 3.2 cm in diameter.  2. No significant bronchiectasis or bronchial wall thickening, and no acute  parenchymal or pleural abnormality identified.    Read and electronically signed by: Anju Linda MD on 8/15/2017 4:41 PM CDT      ANJU LINDA MD    Encounter   View Encounter          Signed by   Signed Credentials Date/Time    Phone Pager   ANJU LINDA MD 8/15/2017 16:43 [phone]          Impression/Plan    Problem List Items Addressed This Visit         Hematology    Pulmonary embolism - Primary    Antithrombin III deficiency  - aware       Endocrine    Malnutrition       Other    Clubbing of fingers  - studies as above, awaiting CF studies (will call pt with results)    Dyspnea on exertion  - has chest restriction by PFT    Non-smoker      Other Visit Diagnoses    None.

## 2017-09-25 ENCOUNTER — TELEPHONE (OUTPATIENT)
Dept: PULMONOLOGY | Facility: CLINIC | Age: 37
End: 2017-09-25

## 2017-09-25 NOTE — TELEPHONE ENCOUNTER
CF mutation study negative, tell her that I will have to consider what other studies to look into for her.

## 2017-11-27 NOTE — PROGRESS NOTES
"Office Visit    HPI:    9/13/2017 - Here for follow up, no new complaints, have reviewed numerous labs with pt and mother, PFT show moderate chest restriction and mild decrease in DLCO, CT chest shows no significant bronchiectasis but has ectasia of the ascending aorta (stable),  CF studies still pending (will call pt with results when available).  All questions answered.    11/28/2017 - Here for follow up, breathing has been ok but did have a coughing spell and some right rib discomfort.  Primary complaints are still GI episodes alternating episodes with chronic diarrhea ("yellow") and some constipation at times.  Also reports episodes of vomiting up undigested food.  No other new problems.    Medications      Current Outpatient Prescriptions:     cyanocobalamin, vitamin B-12, (B-12 COMPLIANCE) 1,000 mcg/mL Kit, Inject 1,000 mcg as directed every 28 days., Disp: , Rfl:     ELIQUIS 5 mg Tab, TK 1 T PO  BID, Disp: , Rfl: 2    ergocalciferol (ERGOCALCIFEROL) 50,000 unit Cap, Take 1 capsule (50,000 Units total) by mouth every 7 days., Disp: 4 capsule, Rfl: 12    ferrous sulfate 324 mg (65 mg iron) TbEC, Take 325 mg by mouth once daily., Disp: , Rfl:     fluoxetine (PROZAC) 20 MG capsule, Take 20 mg by mouth once daily., Disp: , Rfl:     LINZESS 290 mcg Cap, TK 1 C PO D, Disp: , Rfl: 1    MICROGESTIN FE 1.5/30, 28, 1.5 mg-30 mcg (21)/75 mg (7) tablet, TK 1 T PO QD, Disp: , Rfl: 12    multivitamin capsule, Take 1 capsule by mouth once daily., Disp: , Rfl:     promethazine (PHENERGAN) 25 MG suppository, Place 1 suppository (25 mg total) rectally every 6 (six) hours as needed for Nausea., Disp: 10 suppository, Rfl: 0    topiramate (TOPAMAX) 100 MG tablet, Take 1 tablet by mouth 3 (three) times daily., Disp: , Rfl: 1    trazodone (DESYREL) 150 MG tablet, TAKE 2 TS PO QD, Disp: , Rfl: 0    Allergies    Review of patient's allergies indicates:   Allergen Reactions    Iron analogues Anaphylaxis     Infusion only. " "Tolerates po    Bactrim [sulfamethoxazole-trimethoprim] Swelling    Lidocaine Swelling    Nsaids (non-steroidal anti-inflammatory drug)      cannot take NSAIDs- upsets the stomach    Zofran [ondansetron hcl (pf)] Nausea Only       Social History    History   Smoking Status    Never Smoker   Smokeless Tobacco    Never Used       ETOH - 0 drinks per week.    Drug Use - 0    Occupation - not working    Family History    Family History   Problem Relation Age of Onset    Breast cancer Mother 58    Colon cancer Maternal Grandmother 62    Prostate cancer Maternal Grandfather        ROS  Review of Systems   Constitutional: Positive for malaise/fatigue. Negative for chills, diaphoresis, fever and weight loss.   HENT: Negative for congestion.    Eyes: Negative for pain.   Respiratory: Negative for cough, hemoptysis, sputum production, shortness of breath, wheezing and stridor.    Cardiovascular: Negative for chest pain, palpitations, orthopnea, claudication, leg swelling and PND.   Gastrointestinal: Positive for constipation and nausea. Negative for abdominal pain, diarrhea, heartburn and vomiting.        Bloating   Genitourinary: Negative for dysuria, frequency and urgency.   Musculoskeletal: Negative for falls and myalgias.   Neurological: Negative for sensory change, focal weakness and weakness.   Psychiatric/Behavioral: Negative for depression. The patient is not nervous/anxious.        Physical Exam    Vitals:    11/28/17 1008   BP: 100/84   Pulse: 88   SpO2: 99%   Weight: 48.1 kg (106 lb)   Height: 5' 5" (1.651 m)       Physical Exam   Constitutional: She is oriented to person, place, and time. She appears well-developed and well-nourished. No distress.   HENT:   Head: Normocephalic and atraumatic.   Nose: Nose normal.   Mouth/Throat: Oropharynx is clear and moist.   Eyes: Conjunctivae and EOM are normal. Pupils are equal, round, and reactive to light.   Neck: Normal range of motion. Neck supple. No JVD " present. No tracheal deviation present. No thyromegaly present.   Cardiovascular: Normal rate, regular rhythm, normal heart sounds and intact distal pulses.  Exam reveals no gallop and no friction rub.    No murmur heard.  Pulmonary/Chest: Effort normal and breath sounds normal. No stridor. No respiratory distress. She has no wheezes. She has no rales. She exhibits no tenderness.   Abdominal: Soft. Bowel sounds are normal. She exhibits no distension. There is no tenderness.   Musculoskeletal: Normal range of motion. She exhibits no edema or tenderness.   + clubbing   Lymphadenopathy:     She has no cervical adenopathy.   Neurological: She is alert and oriented to person, place, and time. No cranial nerve deficit.   Skin: Skin is warm and dry. She is not diaphoretic.   Psychiatric: She has a normal mood and affect. Her behavior is normal.   Nursing note and vitals reviewed.      Lab    @RESUFAST (WBC,HGB,HCT,PLT)@    Sodium   Date Value Ref Range Status   08/14/2017 138 134 - 144 mmol/L      Potassium   Date Value Ref Range Status   08/14/2017 3.8 3.5 - 5.0 mmol/L      Chloride   Date Value Ref Range Status   08/14/2017 106 98 - 110 mmol/L      CO2   Date Value Ref Range Status   08/14/2017 26.2 22.8 - 31.6 mmol/L      Glucose   Date Value Ref Range Status   08/14/2017 89 70 - 99 mg/dL      BUN, Bld   Date Value Ref Range Status   08/14/2017 11 8 - 20 mg/dL      Creatinine   Date Value Ref Range Status   08/14/2017 0.75 0.60 - 1.40 mg/dL      Calcium   Date Value Ref Range Status   08/14/2017 8.4 7.7 - 10.4 mg/dL      Total Protein   Date Value Ref Range Status   08/14/2017 6.7 6.0 - 8.2 g/dL      Albumin   Date Value Ref Range Status   08/14/2017 3.6 3.1 - 4.7 g/dL      Total Bilirubin   Date Value Ref Range Status   08/14/2017 0.7 0.3 - 1.0 mg/dL      Alkaline Phosphatase   Date Value Ref Range Status   08/14/2017 32 (L) 40 - 104 IU/L      AST   Date Value Ref Range Status   08/14/2017 17 10 - 40 IU/L      ALT    Date Value Ref Range Status   07/21/2017 21 10 - 44 U/L Final     Anion Gap   Date Value Ref Range Status   07/21/2017 7 (L) 8 - 16 mmol/L Final         Xray    CMS MANDATED QUALITY DATA - CT RADIATION ? 436    All CT scans at this facility utilize dose modulation, iterative reconstruction,  and/or weight based dosing when appropriate to reduce radiation dose to as low  as reasonably achievable.    CLINICAL HISTORY:  37 years (1980) Female R06.09/I26.99 patient has a history of shortness of  breath and cystic fibrosis.    TECHNIQUE:  CHEST W/O CONTRAST CT. 577 images obtained. CT scan of the chest was performed  without intravenous contrast.    CONTRAST:  No IV contrast was administered    COMPARISON:  Radiograph the chest most recently from 12/06/2016, and CT of the chest from  08/21/2016    FINDINGS:  Evaluation of the solid organs is limited without intravenous contrast.    Visualized neck:The thyroid gland appears normal.  Lungs: The lungs are clear.  Airway: The trachea and central bronchial tree appear normal. There is no  significant bronchiectasis or bronchial wall thickening.  Pleura: There is no pleural effusion. There is no pneumothorax.  Cardiovascular: The heart is normal in size. There is no pericardial effusion.  The thoracic aorta is ectatic, measuring  3.2 cm in diameter, unchanged from  the previous exam  Mediastinum: There is no supraclavicular, axillary, mediastinal, or hilar  lymphadenopathy. The esophagus appears grossly normal.  Soft tissues: The peripheral soft tissues appear normal.  Musculoskeletal: The visualized osseous structures appear normal.  There are no  suspicious osseous lesions.  Upper Abdomen: The visualized upper abdominal organs appear normal.    IMPRESSION:  1. Mild ectasia of the ascending aorta unchanged from the previous exam  measuring up to 3.2 cm in diameter.  2. No significant bronchiectasis or bronchial wall thickening, and no acute  parenchymal or pleural  abnormality identified.    Read and electronically signed by: Anju Linda MD on 8/15/2017 4:41 PM CDT      ANJU LINDA MD    Encounter   View Encounter          Signed by   Signed Credentials Date/Time    Phone Pager   ANJU LINDA MD 8/15/2017 16:43 [phone]          Impression/Plan    Problem List Items Addressed This Visit        Hematology    Pulmonary embolism - Primary    Antithrombin III deficiency  - aware       Endocrine    Malnutrition/malabsorption  - will give a trial of pancrease to see if it helps  - to call me in 2-3 weeks       Other    Clubbing of fingers  - studies as above  - CF studies negative  - RTC 3 months    Dyspnea on exertion  - has chest restriction by PFT    Non-smoker      Other Visit Diagnoses    None.

## 2017-11-28 ENCOUNTER — OFFICE VISIT (OUTPATIENT)
Dept: PULMONOLOGY | Facility: CLINIC | Age: 37
End: 2017-11-28
Payer: MEDICAID

## 2017-11-28 VITALS
HEIGHT: 65 IN | WEIGHT: 106 LBS | HEART RATE: 88 BPM | DIASTOLIC BLOOD PRESSURE: 84 MMHG | OXYGEN SATURATION: 99 % | BODY MASS INDEX: 17.66 KG/M2 | SYSTOLIC BLOOD PRESSURE: 100 MMHG

## 2017-11-28 DIAGNOSIS — R68.3 CLUBBING OF FINGERS: Primary | ICD-10-CM

## 2017-11-28 DIAGNOSIS — K86.1 CHRONIC PANCREATITIS, UNSPECIFIED PANCREATITIS TYPE: ICD-10-CM

## 2017-11-28 DIAGNOSIS — D64.9 ANEMIA, UNSPECIFIED TYPE: ICD-10-CM

## 2017-11-28 DIAGNOSIS — E46 MALNUTRITION, UNSPECIFIED TYPE: ICD-10-CM

## 2017-11-28 DIAGNOSIS — Z78.9 NON-SMOKER: ICD-10-CM

## 2017-11-28 DIAGNOSIS — R06.09 DYSPNEA ON EXERTION: ICD-10-CM

## 2017-11-28 PROCEDURE — 99214 OFFICE O/P EST MOD 30 MIN: CPT | Mod: ,,, | Performed by: INTERNAL MEDICINE

## 2017-11-28 RX ORDER — LINACLOTIDE 290 UG/1
290 CAPSULE, GELATIN COATED ORAL DAILY
Refills: 1 | COMMUNITY
Start: 2017-11-01

## 2017-12-01 ENCOUNTER — TELEPHONE (OUTPATIENT)
Dept: PULMONOLOGY | Facility: CLINIC | Age: 37
End: 2017-12-01

## 2017-12-01 DIAGNOSIS — E46 MALNUTRITION, UNSPECIFIED TYPE: Primary | ICD-10-CM

## 2017-12-01 DIAGNOSIS — K86.1 CHRONIC PANCREATITIS, UNSPECIFIED PANCREATITIS TYPE: ICD-10-CM

## 2017-12-11 ENCOUNTER — TELEPHONE (OUTPATIENT)
Dept: HEMATOLOGY/ONCOLOGY | Facility: CLINIC | Age: 37
End: 2017-12-11

## 2017-12-11 LAB
ALBUMIN: 3.8 GRAM/DL (ref 3.5–5)
ALP SERPL-CCNC: 45 UNIT/L (ref 38–126)
ALT SERPL W P-5'-P-CCNC: <10 UNIT/L (ref 7–56)
ANION GAP SERPL CALC-SCNC: 18 MEQ/L (ref 9–18)
AST SERPL-CCNC: 14 UNIT/L (ref 7–40)
BASOPHILS ABSOLUTE COUNT: 0 K/UL (ref 0–0.2)
BASOPHILS NFR BLD: 1.1 % (ref 0–2)
BILIRUB SERPL-MCNC: 0.2 MG/DL (ref 0–1.2)
BUN BLD-MCNC: 8 MG/DL (ref 7–21)
BUN/CREAT SERPL: 11 RATIO (ref 6–22)
CALC OSMOLALITY: 279 MOSM/KG (ref 275–295)
CALCIUM SERPL-MCNC: 8.9 MG/DL (ref 8.5–10.5)
CHLORIDE SERPL-SCNC: 104 MEQ/L (ref 98–107)
CO2 SERPL-SCNC: 24 MEQ/L (ref 21–31)
CREAT SERPL-MCNC: 0.7 MG/DL (ref 0.5–1)
DIFFERENTIAL TYPE: ABNORMAL
EOSINOPHIL NFR BLD: 1.1 % (ref 0–4)
EOSINOPHILS ABSOLUTE COUNT: 0 K/UL (ref 0–0.7)
ERYTHROCYTE [DISTWIDTH] IN BLOOD BY AUTOMATED COUNT: 13.1 GRAM/DL (ref 12–15.3)
FERRITIN SERPL-MCNC: 59.7 NANOGRAM/ML (ref 13–150)
GFR: 90.2 ML/MIN/1.73M2
GLUCOSE SERPL-MCNC: 82 MG/DL (ref 70–100)
HCT VFR BLD AUTO: 39.8 % (ref 37–47)
HGB BLD-MCNC: 13.3 GRAM/DL (ref 12–16)
LYMPHOCYTES %: 38.4 % (ref 15–45)
LYMPHOCYTES ABSOLUTE COUNT: 1.5 K/UL (ref 1–4.2)
MCH RBC QN AUTO: 29.6 PICOGRAM (ref 27–33)
MCHC RBC AUTO-ENTMCNC: 33.4 GRAM/DL (ref 32–36)
MCV RBC AUTO: 88.5 FEMTOLITER (ref 81–99)
MONOCYTES %: 9.5 % (ref 3–13)
MONOCYTES ABSOLUTE COUNT: 0.4 K/UL (ref 0.1–0.8)
NEUTROPHILS ABSOLUTE COUNT: 2 K/UL (ref 2.1–7.6)
NEUTROPHILS RELATIVE PERCENT: 49.9 % (ref 32–80)
PLATELET # BLD AUTO: 299 K/UL (ref 150–350)
PMV BLD AUTO: 8.1 FEMTOLITER (ref 7–10.2)
POTASSIUM SERPL-SCNC: 4.9 MEQ/L (ref 3.5–5)
RBC # BLD AUTO: 4.5 MIL/UL (ref 4.2–5.4)
SODIUM BLD-SCNC: 141 MEQ/L (ref 135–145)
TOTAL PROTEIN: 6.7 GRAM/DL (ref 6.3–8.2)
WBC # BLD AUTO: 4 K/UL (ref 4.5–11)

## 2017-12-13 ENCOUNTER — TELEPHONE (OUTPATIENT)
Dept: HEMATOLOGY/ONCOLOGY | Facility: CLINIC | Age: 37
End: 2017-12-13

## 2017-12-13 NOTE — TELEPHONE ENCOUNTER
----- Message from Miguel Hurst MD sent at 12/12/2017  7:37 PM CST -----  Call her with good report

## 2017-12-14 ENCOUNTER — OFFICE VISIT (OUTPATIENT)
Dept: HEMATOLOGY/ONCOLOGY | Facility: CLINIC | Age: 37
End: 2017-12-14
Payer: MEDICAID

## 2017-12-14 VITALS
WEIGHT: 106.31 LBS | HEART RATE: 70 BPM | RESPIRATION RATE: 18 BRPM | BODY MASS INDEX: 17.71 KG/M2 | HEIGHT: 65 IN | DIASTOLIC BLOOD PRESSURE: 71 MMHG | TEMPERATURE: 98 F | SYSTOLIC BLOOD PRESSURE: 104 MMHG

## 2017-12-14 DIAGNOSIS — I26.99 OTHER PULMONARY EMBOLISM WITHOUT ACUTE COR PULMONALE, UNSPECIFIED CHRONICITY: Primary | ICD-10-CM

## 2017-12-14 DIAGNOSIS — D68.59 ANTITHROMBIN III DEFICIENCY: ICD-10-CM

## 2017-12-14 DIAGNOSIS — E53.8 B12 DEFICIENCY: ICD-10-CM

## 2017-12-14 DIAGNOSIS — D50.9 IRON DEFICIENCY ANEMIA, UNSPECIFIED IRON DEFICIENCY ANEMIA TYPE: ICD-10-CM

## 2017-12-14 DIAGNOSIS — Z15.89 MTHFR MUTATION: ICD-10-CM

## 2017-12-14 PROCEDURE — 99213 OFFICE O/P EST LOW 20 MIN: CPT | Mod: ,,, | Performed by: INTERNAL MEDICINE

## 2017-12-14 RX ORDER — PENICILLIN V POTASSIUM 500 MG/1
TABLET, FILM COATED ORAL
Refills: 0 | COMMUNITY
Start: 2017-12-01 | End: 2019-08-26

## 2017-12-14 NOTE — LETTER
December 14, 2017      Lizy Diamond MD  [address]  [address]  [place] [address]           [facility]  [address]  [address]  [place] [facility] [place] [address]  Phone: [phone]  Fax: [phone]          Patient: Janeth Siddiqi   MR Number: [id number]   YOB: 1980   Date of Visit: 12/14/2017       Dear Dr. Lizy Diamond:    Thank you for referring Janeth Siddiqi to me for evaluation. Attached you will find relevant portions of my assessment and plan of care.    If you have questions, please do not hesitate to call me. I look forward to following Janeth Siddiqi along with you.    Sincerely,    Miguel Hurst MD    Enclosure  CC:  No Recipients    If you would like to receive this communication electronically, please contact [email or web address] or [phone] to request more information on [facility] access.    For providers and/or their staff who would like to refer a patient to [facility], please contact us through our one-stop-shop provider referral line, [facility], at [phone].    If you feel you have received this communication in error or would no longer like to receive these types of communications, please e-mail [email or web address]

## 2017-12-14 NOTE — PROGRESS NOTES
[facility]  PROGRESS NOTE      Subjective:       Patient ID:   NAME: Janeth Siddiqi : 1980     37 y.o. female    Referring Doc: Lizy Diamond  Other Physicians: Francoise oNriega    Chief Complaint:  Anemia/b12 f/u    History of Present Illness:     Patient returns today for a regularly scheduled follow-up visit.  The patient is here with her mom and grandmother. She has been having menstrual bleeding and cramping. She is to see GYN in near future. No CP, SOB, HA's or N/V.         ROS:   GEN: normal without any fever, night sweats or weight loss  HEENT: normal with no HA's, sore throat, stiff neck, changes in vision  CV: normal with no CP, SOB, PND, KRAUSE or orthopnea  PULM: normal with no SOB, cough, hemoptysis, sputum or pleuritic pain  GI: normal with no abdominal pain, nausea, vomiting, constipation, diarrhea, melanotic stools, BRBPR, or hematemesis  : normal with no hematuria, dysuria  BREAST: normal with no mass, discharge, pain  SKIN: normal with no rash, erythema, bruising, or swelling    Allergies:  Review of patient's allergies indicates:   Allergen Reactions    Iron analogues Anaphylaxis     Infusion only. Tolerates po    Bactrim [sulfamethoxazole-trimethoprim] Swelling    Lidocaine Swelling    Nsaids (non-steroidal anti-inflammatory drug)      cannot take NSAIDs- upsets the stomach    Zofran [ondansetron hcl (pf)] Nausea Only       Medications:    Current Outpatient Prescriptions:     cyanocobalamin, vitamin B-12, (B-12 COMPLIANCE) 1,000 mcg/mL Kit, Inject 1,000 mcg as directed every 28 days., Disp: , Rfl:     ELIQUIS 5 mg Tab, TK 1 T PO  BID, Disp: , Rfl: 2    ergocalciferol (ERGOCALCIFEROL) 50,000 unit Cap, Take 1 capsule (50,000 Units total) by mouth every 7 days., Disp: 4 capsule, Rfl: 12    ferrous sulfate 324 mg (65 mg iron) TbEC, Take 325 mg by mouth once daily., Disp: , Rfl:     fluoxetine (PROZAC) 20 MG capsule, Take 20 mg by mouth once daily., Disp: ,  "Rfl:     LINZESS 290 mcg Cap, TK 1 C PO D, Disp: , Rfl: 1    lipase-protease-amylase 12,000-38,000-60,000 units (CREON) CpDR, Take 1 capsule by mouth 3 (three) times daily with meals., Disp: 90 capsule, Rfl: 11    MICROGESTIN FE 1.5/30, 28, 1.5 mg-30 mcg (21)/75 mg (7) tablet, TK 1 T PO QD, Disp: , Rfl: 12    multivitamin capsule, Take 1 capsule by mouth once daily., Disp: , Rfl:     penicillin v potassium (VEETID) 500 MG tablet, TK 1 T PO  TID, Disp: , Rfl: 0    promethazine (PHENERGAN) 25 MG suppository, Place 1 suppository (25 mg total) rectally every 6 (six) hours as needed for Nausea., Disp: 10 suppository, Rfl: 0    topiramate (TOPAMAX) 100 MG tablet, Take 1 tablet by mouth 3 (three) times daily., Disp: , Rfl: 1    trazodone (DESYREL) 150 MG tablet, TAKE 2 TS PO QD, Disp: , Rfl: 0    PMHx/PSHx Updates:  See patient's last visit with me on 5/10/17  See H&P on 12/7/16        Pathology:  No matching staging information was found for the patient.          Objective:     Vitals:  Blood pressure 104/71, pulse 70, temperature 98.2 °F (36.8 °C), resp. rate 18, height 5' 5" (1.651 m), weight 48.2 kg (106 lb 4.8 oz).    Physical Examination:   GEN: no apparent distress, comfortable; AAOx3  HEAD: atraumatic and normocephalic  EYES: no pallor, no icterus, PERRLA  ENT: OMM, no pharyngeal erythema, external ears WNL; no nasal discharge; no thrush  NECK: no masses, thyroid normal, trachea midline, no LAD/LN's, supple  CV: RRR with no murmur; normal pulse; normal S1 and S2; no pedal edema  CHEST: Normal respiratory effort; CTAB; normal breath sounds; no wheeze or crackles  ABDOM: nontender and nondistended; soft; normal bowel sounds; no rebound/guarding  MUSC/Skeletal: ROM normal; no crepitus; joints normal; no deformities or arthropathy  EXTREM: no clubbing, cyanosis, inflammation or swelling  SKIN: no rashes, lesions, ulcers, petechiae or subcutaneous nodules  : no reza  NEURO: grossly intact; motor/sensory WNL; " AAOx3; no tremors  PSYCH: normal mood, affect and behavior  LYMPH: normal cervical, supraclavicular, axillary and groin LN's            Labs:     12/11/2017  Lab Results   Component Value Date    WBC 4.0 (L) 12/11/2017    HGB 13.3 12/11/2017    HCT 39.8 12/11/2017    MCV 88.5 12/11/2017     12/11/2017     BMP  Lab Results   Component Value Date     12/11/2017    K 4.9 12/11/2017     12/11/2017    CO2 24 12/11/2017    BUN 8 12/11/2017    CREATININE 0.7 12/11/2017    CALCIUM 8.9 12/11/2017    ANIONGAP 18 12/11/2017    ESTGFRAFRICA >60 07/21/2017    EGFRNONAA >60 07/21/2017     Lab Results   Component Value Date    IRON 45 04/17/2017    TIBC 371 04/17/2017    FERRITIN 59.7 12/11/2017           Radiology/Diagnostic Studies:    X-ray Abdomen Flat And Erect    Result Date: 7/21/2017  EXAM: KUB. INDICATION: Obstruction. COMPARISON: KUB 1/1/14. FINDINGS: Supine and upright radiographs of the abdomen demonstrate a nonobstructive bowel gas pattern. There is no free air. There is no pneumatosis. There is no portal venous gas. There is calcification. Lung bases are clear. There is no osseous abnormality.    1.Nonobstructed bowel gas pattern. Electronically signed by: KELBY DENNIS MD Date:     07/21/17 Time:    13:49     Ct Abdomen Pelvis With Contrast    Result Date: 7/21/2017  Procedure: CT of the abdomen and pelvis with IV contrast. Technique: Axial images of the abdomen and pelvis were obtained with intravenous contrast administration. Coronal and sagittal reconstructions were provided. Total DLP was 691 mGy-cm.  Dose lowering technique, automated exposure control, was utilized for this exam. History: Left-sided abdominal pain and vomiting. Comparison: CT of the abdomen and pelvis 7/31/2015. Findings: The bilateral lung bases are clear. The heart is normal in size. There is a 7 mm focus of arterial enhancement within segment 4B of the liver which is isodense to the adjacent liver on delayed phase. This most  consistent with a flash filling hemangioma. The portal vein is patent. The spleen, pancreas, and adrenal glands are normal. Bilateral kidneys are normal. There is no hydronephrosis or nephrolithiasis. The stomach and small bowel are decompressed. The appendix is not visualized, however there is no right lower quadrant inflammatory stranding. The colon is normal. The uterus and adnexa are normal for age. Urinary bladder is normal. There is trace physiologic free fluid in the lower pelvis. There is no pelvic or retroperitoneal adenopathy. The abdominal aorta is non-aneurysmal. There is no lytic or blastic osseous lesions.     No acute abnormality of the abdomen and pelvis. Electronically signed by: KELBY DENNIS MD Date:     07/21/17 Time:    15:37       I have reviewed all available lab results and radiology reports.    Assessment/Plan:   (1) 37 y.o. female with diagnosis of iron deficiency anemia and B12 deficiency  - she was previously on oral iron and monthly B12  - latest ferritin is 57 and much better  - B12 893 previously  - hgb is 13.3    (2) prior Pulmonary emboli with prior use of OCP's  - on oral anticoagulant drug Eliquis  - underlying MTHFR-A heterozygous and ATIII deficiency issues  - followed by Dr Mccray with Pulm and he order several tests and is doing workup for cystic fibrosis    (3) Crohn's disorder/pancreatitis - followed by Dr Noriega with GI    (4) Chronic GYN bleeding - followed by Dr Gigi Oquendo with GYN  - latest hgb wnl  - she is on OCP's still and thus she needs to probably remain on the blood thinners    (5) prior Vit D deficiency    (6) She is being seen by Dr Khoobehi with plastics in [place] and had a  Voluma skin filler without any difficulties      PLAN:  1. Check labs every 3 months  2. F/u with PCP , GYn, and pulm  3. RTC  in 4 months  Fax note to Schuette, Striplin, Bulat, Khoobehi, Kate Brown    Discussion:     I have explained all of the above in detail and the patient  understands all of the current recommendation(s). I have answered all of their questions to the best of my ability and to their complete satisfaction.   The patient is to continue with the current management plan.            Electronically signed by Miguel Hurst MD

## 2017-12-15 ENCOUNTER — TELEPHONE (OUTPATIENT)
Dept: PULMONOLOGY | Facility: CLINIC | Age: 37
End: 2017-12-15

## 2018-01-12 ENCOUNTER — TELEPHONE (OUTPATIENT)
Dept: HEMATOLOGY/ONCOLOGY | Facility: CLINIC | Age: 38
End: 2018-01-12

## 2018-01-12 NOTE — TELEPHONE ENCOUNTER
Patient's mother stated they did not receive instructions or syringes with b12 injections and was using insulin syringes. She asked for the correct syringe and needles be called into pharmacy.  Spoke with [facility] pharmacy and they are not able to take an order for the syringes or needles. They have to be cash pay.

## 2018-01-17 DIAGNOSIS — E53.8 B12 DEFICIENCY: ICD-10-CM

## 2018-01-17 DIAGNOSIS — D50.8 OTHER IRON DEFICIENCY ANEMIA: Primary | ICD-10-CM

## 2018-01-17 DIAGNOSIS — E55.9 VITAMIN D DEFICIENCY: ICD-10-CM

## 2018-01-18 RX ORDER — FERROUS SULFATE 324(65)MG
325 TABLET, DELAYED RELEASE (ENTERIC COATED) ORAL 2 TIMES DAILY
Qty: 60 TABLET | Refills: 3 | COMMUNITY
Start: 2018-01-18 | End: 2018-01-24 | Stop reason: SDUPTHER

## 2018-01-24 DIAGNOSIS — D50.8 OTHER IRON DEFICIENCY ANEMIA: ICD-10-CM

## 2018-01-24 DIAGNOSIS — E55.9 VITAMIN D DEFICIENCY: ICD-10-CM

## 2018-01-24 DIAGNOSIS — E53.8 B12 DEFICIENCY: ICD-10-CM

## 2018-01-24 RX ORDER — FERROUS SULFATE 324(65)MG
325 TABLET, DELAYED RELEASE (ENTERIC COATED) ORAL 2 TIMES DAILY
Qty: 60 TABLET | Refills: 3 | COMMUNITY
Start: 2018-01-24 | End: 2018-01-29 | Stop reason: SDUPTHER

## 2018-01-29 RX ORDER — FERROUS SULFATE 324(65)MG
325 TABLET, DELAYED RELEASE (ENTERIC COATED) ORAL 2 TIMES DAILY
Qty: 60 TABLET | Refills: 3 | COMMUNITY
Start: 2018-01-29 | End: 2021-06-23

## 2018-02-02 ENCOUNTER — TELEPHONE (OUTPATIENT)
Dept: HEMATOLOGY/ONCOLOGY | Facility: CLINIC | Age: 38
End: 2018-02-02

## 2018-03-01 ENCOUNTER — OFFICE VISIT (OUTPATIENT)
Dept: PULMONOLOGY | Facility: CLINIC | Age: 38
End: 2018-03-01
Payer: MEDICAID

## 2018-03-01 VITALS
DIASTOLIC BLOOD PRESSURE: 60 MMHG | BODY MASS INDEX: 17.83 KG/M2 | HEIGHT: 65 IN | WEIGHT: 107 LBS | SYSTOLIC BLOOD PRESSURE: 88 MMHG

## 2018-03-01 DIAGNOSIS — D68.59 ANTITHROMBIN III DEFICIENCY: ICD-10-CM

## 2018-03-01 DIAGNOSIS — R68.3 CLUBBING OF FINGERS: ICD-10-CM

## 2018-03-01 DIAGNOSIS — I26.99 OTHER PULMONARY EMBOLISM WITHOUT ACUTE COR PULMONALE, UNSPECIFIED CHRONICITY: Primary | ICD-10-CM

## 2018-03-01 PROCEDURE — 99214 OFFICE O/P EST MOD 30 MIN: CPT | Mod: ,,, | Performed by: INTERNAL MEDICINE

## 2018-03-01 NOTE — PROGRESS NOTES
"Office Visit    HPI:    9/13/2017 - Here for follow up, no new complaints, have reviewed numerous labs with pt and mother, PFT show moderate chest restriction and mild decrease in DLCO, CT chest shows no significant bronchiectasis but has ectasia of the ascending aorta (stable),  CF studies still pending (will call pt with results when available).  All questions answered.    11/28/2017 - Here for follow up, breathing has been ok but did have a coughing spell and some right rib discomfort.  Primary complaints are still GI episodes alternating episodes with chronic diarrhea ("yellow") and some constipation at times.  Also reports episodes of vomiting up undigested food.  No other new problems.    3/1/2018 - Here for follow up, feels better since starting pancreatic replacement (her primary ad GI MD like the idea).  Had flu about 1 month ago.  Still feels that she is having some pain at right lung base, was a heavy feeling worse with a deep breath.  No real increase in cough or congestion now.  Still with some episodes of vomiting.    Medications      Current Outpatient Prescriptions:     cyanocobalamin, vitamin B-12, (B-12 COMPLIANCE) 1,000 mcg/mL Kit, Inject 1,000 mcg as directed every 28 days., Disp: , Rfl:     ELIQUIS 5 mg Tab, TK 1 T PO  BID, Disp: , Rfl: 2    ergocalciferol (ERGOCALCIFEROL) 50,000 unit Cap, Take 1 capsule (50,000 Units total) by mouth every 7 days., Disp: 4 capsule, Rfl: 12    ferrous sulfate 324 mg (65 mg iron) TbEC, Take 1 tablet (324 mg total) by mouth 2 (two) times daily., Disp: 60 tablet, Rfl: 3    fluoxetine (PROZAC) 20 MG capsule, Take 20 mg by mouth once daily., Disp: , Rfl:     LINZESS 290 mcg Cap, TK 1 C PO D, Disp: , Rfl: 1    lipase-protease-amylase 12,000-38,000-60,000 units (CREON) CpDR, Take 1 capsule by mouth 3 (three) times daily with meals., Disp: 90 capsule, Rfl: 11    MICROGESTIN FE 1.5/30, 28, 1.5 mg-30 mcg (21)/75 mg (7) tablet, TK 1 T PO QD, Disp: , Rfl: 12    " multivitamin capsule, Take 1 capsule by mouth once daily., Disp: , Rfl:     penicillin v potassium (VEETID) 500 MG tablet, TK 1 T PO  TID, Disp: , Rfl: 0    promethazine (PHENERGAN) 25 MG suppository, Place 1 suppository (25 mg total) rectally every 6 (six) hours as needed for Nausea., Disp: 10 suppository, Rfl: 0    topiramate (TOPAMAX) 100 MG tablet, Take 1 tablet by mouth 3 (three) times daily., Disp: , Rfl: 1    trazodone (DESYREL) 150 MG tablet, TAKE 2 TS PO QD, Disp: , Rfl: 0    Allergies    Review of patient's allergies indicates:   Allergen Reactions    Iron analogues Anaphylaxis     Infusion only. Tolerates po    Bactrim [sulfamethoxazole-trimethoprim] Swelling    Lidocaine Swelling    Nsaids (non-steroidal anti-inflammatory drug)      cannot take NSAIDs- upsets the stomach    Zofran [ondansetron hcl (pf)] Nausea Only       Social History    History   Smoking Status    Never Smoker   Smokeless Tobacco    Never Used       ETOH - 0 drinks per week.    Drug Use - 0    Occupation - not working    Family History    Family History   Problem Relation Age of Onset    Breast cancer Mother 58    Colon cancer Maternal Grandmother 62    Prostate cancer Maternal Grandfather        ROS  Review of Systems   Constitutional: Positive for malaise/fatigue. Negative for chills, diaphoresis, fever and weight loss.   HENT: Negative for congestion.    Eyes: Negative for pain.   Respiratory: Negative for cough, hemoptysis, sputum production, shortness of breath, wheezing and stridor.    Cardiovascular: Negative for chest pain, palpitations, orthopnea, claudication, leg swelling and PND.   Gastrointestinal: Positive for constipation and nausea. Negative for abdominal pain, diarrhea, heartburn and vomiting.        Bloating   Genitourinary: Negative for dysuria, frequency and urgency.   Musculoskeletal: Negative for falls and myalgias.   Neurological: Negative for sensory change, focal weakness and weakness.  "  Psychiatric/Behavioral: Negative for depression. The patient is not nervous/anxious.        Physical Exam    Vitals:    03/01/18 1028   BP: (!) 88/60   Weight: 48.5 kg (107 lb)   Height: 5' 5" (1.651 m)       Physical Exam   Constitutional: She is oriented to person, place, and time. She appears well-developed and well-nourished. No distress.   HENT:   Head: Normocephalic and atraumatic.   Nose: Nose normal.   Mouth/Throat: Oropharynx is clear and moist.   Eyes: Conjunctivae and EOM are normal. Pupils are equal, round, and reactive to light.   Neck: Normal range of motion. Neck supple. No JVD present. No tracheal deviation present. No thyromegaly present.   Cardiovascular: Normal rate, regular rhythm, normal heart sounds and intact distal pulses.  Exam reveals no gallop and no friction rub.    No murmur heard.  Pulmonary/Chest: Effort normal and breath sounds normal. No stridor. No respiratory distress. She has no wheezes. She has no rales. She exhibits no tenderness.   Abdominal: Soft. Bowel sounds are normal. She exhibits no distension. There is no tenderness.   Musculoskeletal: Normal range of motion. She exhibits no edema or tenderness.   + clubbing   Lymphadenopathy:     She has no cervical adenopathy.   Neurological: She is alert and oriented to person, place, and time. No cranial nerve deficit.   Skin: Skin is warm and dry. She is not diaphoretic.   Psychiatric: She has a normal mood and affect. Her behavior is normal.   Nursing note and vitals reviewed.      Lab    @RESUFAST (WBC,HGB,HCT,PLT)@    Sodium   Date Value Ref Range Status   12/11/2017 141 135 - 145 mEq/L Final   08/14/2017 138 134 - 144 mmol/L      Potassium   Date Value Ref Range Status   12/11/2017 4.9 3.5 - 5.0 mEq/L Final     Chloride   Date Value Ref Range Status   12/11/2017 104 98 - 107 mEq/L Final   08/14/2017 106 98 - 110 mmol/L      CO2   Date Value Ref Range Status   12/11/2017 24 21 - 31 mEq/L Final     Glucose   Date Value Ref Range " Status   12/11/2017 82 70 - 100 mg/dL Final   08/14/2017 89 70 - 99 mg/dL      BUN   Date Value Ref Range Status   12/11/2017 8 7 - 21 mg/dL Final     Creatinine   Date Value Ref Range Status   12/11/2017 0.7 0.5 - 1.0 mg/dL Final   08/14/2017 0.75 0.60 - 1.40 mg/dL      Calcium   Date Value Ref Range Status   12/11/2017 8.9 8.5 - 10.5 mg/dL Final     Total Protein   Date Value Ref Range Status   12/11/2017 6.7 6.3 - 8.2 gram/dL Final   08/14/2017 6.7 6.0 - 8.2 g/dL      Albumin   Date Value Ref Range Status   08/14/2017 3.6 3.1 - 4.7 g/dL      ALBUMIN   Date Value Ref Range Status   12/11/2017 3.8 3.5 - 5.0 gram/dL Final     Total Bilirubin   Date Value Ref Range Status   12/11/2017 0.2 0.0 - 1.2 mg/dL Final     Alkaline Phosphatase   Date Value Ref Range Status   12/11/2017 45 38 - 126 unit/L Final     AST   Date Value Ref Range Status   12/11/2017 14 7 - 40 unit/L Final     ALT   Date Value Ref Range Status   12/11/2017 <10 7 - 56 unit/L Final     Anion Gap   Date Value Ref Range Status   12/11/2017 18 9 - 18 mEq/L Final         Xray    CMS MANDATED QUALITY DATA - CT RADIATION ? 436    All CT scans at this facility utilize dose modulation, iterative reconstruction,  and/or weight based dosing when appropriate to reduce radiation dose to as low  as reasonably achievable.    CLINICAL HISTORY:  37 years (1980) Female R06.09/I26.99 patient has a history of shortness of  breath and cystic fibrosis.    TECHNIQUE:  CHEST W/O CONTRAST CT. 577 images obtained. CT scan of the chest was performed  without intravenous contrast.    CONTRAST:  No IV contrast was administered    COMPARISON:  Radiograph the chest most recently from 12/06/2016, and CT of the chest from  08/21/2016    FINDINGS:  Evaluation of the solid organs is limited without intravenous contrast.    Visualized neck:The thyroid gland appears normal.  Lungs: The lungs are clear.  Airway: The trachea and central bronchial tree appear normal. There is  no  significant bronchiectasis or bronchial wall thickening.  Pleura: There is no pleural effusion. There is no pneumothorax.  Cardiovascular: The heart is normal in size. There is no pericardial effusion.  The thoracic aorta is ectatic, measuring  3.2 cm in diameter, unchanged from  the previous exam  Mediastinum: There is no supraclavicular, axillary, mediastinal, or hilar  lymphadenopathy. The esophagus appears grossly normal.  Soft tissues: The peripheral soft tissues appear normal.  Musculoskeletal: The visualized osseous structures appear normal.  There are no  suspicious osseous lesions.  Upper Abdomen: The visualized upper abdominal organs appear normal.    IMPRESSION:  1. Mild ectasia of the ascending aorta unchanged from the previous exam  measuring up to 3.2 cm in diameter.  2. No significant bronchiectasis or bronchial wall thickening, and no acute  parenchymal or pleural abnormality identified.    Read and electronically signed by: Anju Linda MD on 8/15/2017 4:41 PM CDT      ANJU LINDA MD    Encounter   View Encounter          Signed by   Signed Credentials Date/Time    Phone Pager   ANJU LINDA MD 8/15/2017 16:43 [phone]          Impression/Plan    Problem List Items Addressed This Visit        Hematology    Pulmonary embolism - Primary    Antithrombin III deficiency  - aware       Endocrine    Malnutrition/malabsorption  - seems better with pancrease       Other    Clubbing of fingers  - CF studies negative  - RTC 3 months    Dyspnea on exertion  - has chest restriction by PFT    Non-smoker      Other Visit Diagnoses    None.

## 2018-03-21 ENCOUNTER — OFFICE VISIT (OUTPATIENT)
Dept: HEMATOLOGY/ONCOLOGY | Facility: CLINIC | Age: 38
End: 2018-03-21
Payer: MEDICAID

## 2018-03-21 VITALS — WEIGHT: 108.19 LBS | BODY MASS INDEX: 18.01 KG/M2 | RESPIRATION RATE: 18 BRPM | TEMPERATURE: 99 F

## 2018-03-21 DIAGNOSIS — D50.8 OTHER IRON DEFICIENCY ANEMIA: ICD-10-CM

## 2018-03-21 DIAGNOSIS — Z15.89 MTHFR MUTATION: Primary | ICD-10-CM

## 2018-03-21 DIAGNOSIS — I26.99 OTHER PULMONARY EMBOLISM WITHOUT ACUTE COR PULMONALE, UNSPECIFIED CHRONICITY: ICD-10-CM

## 2018-03-21 DIAGNOSIS — D68.59 ANTITHROMBIN III DEFICIENCY: ICD-10-CM

## 2018-03-21 DIAGNOSIS — E53.8 B12 DEFICIENCY: ICD-10-CM

## 2018-03-21 PROCEDURE — 99213 OFFICE O/P EST LOW 20 MIN: CPT | Mod: ,,, | Performed by: INTERNAL MEDICINE

## 2018-03-21 NOTE — PROGRESS NOTES
[facility] Hematology/Oncology  PROGRESS NOTE      Subjective:       Patient ID:   NAME: Janeth Siddiqi : 1980     38 y.o. female    Referring Doc: Lizy Diamond  Other Physicians: Francoise Noriega    Chief Complaint:  Anemia/b12 f/u    History of Present Illness:     Patient returns today for a regularly scheduled follow-up visit.  The patient is here with her mom and grandmother. She has been having menstrual bleeding and cramping. She is followed by GYN with Dr Oquendo. No CP, SOB, HA's or N/V. She saw Allergy-immunology at [facility].         ROS:   GEN: normal without any fever, night sweats or weight loss  HEENT: normal with no HA's, sore throat, stiff neck, changes in vision  CV: normal with no CP, SOB, PND, KRAUSE or orthopnea  PULM: normal with no SOB, cough, hemoptysis, sputum or pleuritic pain  GI: normal with no abdominal pain, nausea, vomiting, constipation, diarrhea, melanotic stools, BRBPR, or hematemesis  : normal with no hematuria, dysuria  BREAST: normal with no mass, discharge, pain  SKIN: normal with no rash, erythema, bruising, or swelling    Allergies:  Review of patient's allergies indicates:   Allergen Reactions    Iron analogues Anaphylaxis     Infusion only. Tolerates po    Bactrim [sulfamethoxazole-trimethoprim] Swelling    Lidocaine Swelling    Nsaids (non-steroidal anti-inflammatory drug)      cannot take NSAIDs- upsets the stomach    Zofran [ondansetron hcl (pf)] Nausea Only       Medications:    Current Outpatient Prescriptions:     cyanocobalamin, vitamin B-12, (B-12 COMPLIANCE) 1,000 mcg/mL Kit, Inject 1,000 mcg as directed every 28 days., Disp: , Rfl:     ELIQUIS 5 mg Tab, TK 1 T PO  BID, Disp: , Rfl: 2    ergocalciferol (ERGOCALCIFEROL) 50,000 unit Cap, Take 1 capsule (50,000 Units total) by mouth every 7 days., Disp: 4 capsule, Rfl: 12    ferrous sulfate 324 mg (65 mg iron) TbEC, Take 1 tablet (324 mg total) by mouth 2 (two) times daily., Disp: 60 tablet, Rfl:  3    fluoxetine (PROZAC) 20 MG capsule, Take 20 mg by mouth once daily., Disp: , Rfl:     LINZESS 290 mcg Cap, TK 1 C PO D, Disp: , Rfl: 1    lipase-protease-amylase 12,000-38,000-60,000 units (CREON) CpDR, Take 1 capsule by mouth 3 (three) times daily with meals., Disp: 90 capsule, Rfl: 11    MICROGESTIN FE 1.5/30, 28, 1.5 mg-30 mcg (21)/75 mg (7) tablet, TK 1 T PO QD, Disp: , Rfl: 12    multivitamin capsule, Take 1 capsule by mouth once daily., Disp: , Rfl:     penicillin v potassium (VEETID) 500 MG tablet, TK 1 T PO  TID, Disp: , Rfl: 0    promethazine (PHENERGAN) 25 MG suppository, Place 1 suppository (25 mg total) rectally every 6 (six) hours as needed for Nausea., Disp: 10 suppository, Rfl: 0    topiramate (TOPAMAX) 100 MG tablet, Take 1 tablet by mouth 3 (three) times daily., Disp: , Rfl: 1    trazodone (DESYREL) 150 MG tablet, TAKE 2 TS PO QD, Disp: , Rfl: 0    PMHx/PSHx Updates:  See patient's last visit with me on 12/14/17  See H&P on 12/7/16        Pathology:  none          Objective:     Vitals:  Temperature 98.7 °F (37.1 °C), resp. rate 18, weight 49.1 kg (108 lb 3.2 oz).    Physical Examination:   GEN: no apparent distress, comfortable; AAOx3  HEAD: atraumatic and normocephalic  EYES: no pallor, no icterus, PERRLA  ENT: OMM, no pharyngeal erythema, external ears WNL; no nasal discharge; no thrush  NECK: no masses, thyroid normal, trachea midline, no LAD/LN's, supple  CV: RRR with no murmur; normal pulse; normal S1 and S2; no pedal edema  CHEST: Normal respiratory effort; CTAB; normal breath sounds; no wheeze or crackles  ABDOM: nontender and nondistended; soft; normal bowel sounds; no rebound/guarding  MUSC/Skeletal: ROM normal; no crepitus; joints normal; no deformities or arthropathy  EXTREM: no clubbing, cyanosis, inflammation or swelling  SKIN: no rashes, lesions, ulcers, petechiae or subcutaneous nodules  : no reza  NEURO: grossly intact; motor/sensory WNL; AAOx3; no tremors  PSYCH:  normal mood, affect and behavior  LYMPH: normal cervical, supraclavicular, axillary and groin LN's            Labs:     3/2/2018  Lab Results   Component Value Date    WBC 3.7 (L) 03/14/2018    HGB 14.0 03/14/2018    HCT 42.3 03/14/2018    MCV 91.0 03/14/2018     03/14/2018     BMP  Lab Results   Component Value Date     03/14/2018    K 3.9 03/14/2018     03/14/2018    CO2 22 03/14/2018    BUN 10 03/14/2018    CREATININE 0.84 03/14/2018    CALCIUM 8.8 03/14/2018    ANIONGAP 18 12/11/2017    ESTGFRAFRICA 103 03/14/2018    EGFRNONAA 89 03/14/2018     Lab Results   Component Value Date    IRON 117 03/14/2018    TIBC 242 (L) 03/14/2018    FERRITIN 62 03/14/2018           Radiology/Diagnostic Studies:    X-ray Abdomen Flat And Erect    Result Date: 7/21/2017  EXAM: KUB. INDICATION: Obstruction. COMPARISON: KUB 1/1/14. FINDINGS: Supine and upright radiographs of the abdomen demonstrate a nonobstructive bowel gas pattern. There is no free air. There is no pneumatosis. There is no portal venous gas. There is calcification. Lung bases are clear. There is no osseous abnormality.    1.Nonobstructed bowel gas pattern. Electronically signed by: KELBY DENNIS MD Date:     07/21/17 Time:    13:49     Ct Abdomen Pelvis With Contrast    Result Date: 7/21/2017  Procedure: CT of the abdomen and pelvis with IV contrast. Technique: Axial images of the abdomen and pelvis were obtained with intravenous contrast administration. Coronal and sagittal reconstructions were provided. Total DLP was 691 mGy-cm.  Dose lowering technique, automated exposure control, was utilized for this exam. History: Left-sided abdominal pain and vomiting. Comparison: CT of the abdomen and pelvis 7/31/2015. Findings: The bilateral lung bases are clear. The heart is normal in size. There is a 7 mm focus of arterial enhancement within segment 4B of the liver which is isodense to the adjacent liver on delayed phase. This most consistent with a flash  filling hemangioma. The portal vein is patent. The spleen, pancreas, and adrenal glands are normal. Bilateral kidneys are normal. There is no hydronephrosis or nephrolithiasis. The stomach and small bowel are decompressed. The appendix is not visualized, however there is no right lower quadrant inflammatory stranding. The colon is normal. The uterus and adnexa are normal for age. Urinary bladder is normal. There is trace physiologic free fluid in the lower pelvis. There is no pelvic or retroperitoneal adenopathy. The abdominal aorta is non-aneurysmal. There is no lytic or blastic osseous lesions.     No acute abnormality of the abdomen and pelvis. Electronically signed by: TINA DENNIS MD Date:     07/21/17 Time:    15:37       I have reviewed all available lab results and radiology reports.    Assessment/Plan:   (1) 38 y.o. female with diagnosis of iron deficiency anemia and B12 deficiency  - she was previously on oral iron and monthly B12  - latest ferritin is 57 and much better  - B12 adeqaute  - hgb is 14.0    (2) prior Pulmonary emboli with prior use of OCP's  - on oral anticoagulant drug Eliquis  - underlying MTHFR-A heterozygous and ATIII deficiency issues  - followed by Dr Mccray with Pulm and he ordered several tests and  workup for cystic fibrosis which was negative    (3) Crohn's disorder/pancreatitis - followed by Dr Noriega with GI in past and now Dr tina Sinclair    (4) Chronic GYN bleeding - followed by Dr Gigi Oquendo with GYN  - latest hgb wnl  - she is on OCP's still and thus she needs to probably remain on the blood thinners    (5) prior Vit D deficiency    (6) She is being seen by Dr Khoobehi with plastics in [place] and had a Voluma skin filler without any difficulties      PLAN:  1. Check labs every 3 months  2. F/u with PCP , GYn, and pulm  3. RTC  in 6 months  Fax note to Schuette, Striplin, Khoobehi, Kate Brown, [place]ings    Discussion:     I have explained all of the above in detail and  the patient understands all of the current recommendation(s). I have answered all of their questions to the best of my ability and to their complete satisfaction.   The patient is to continue with the current management plan.            Electronically signed by Miguel Hurst MD

## 2018-03-21 NOTE — LETTER
March 21, 2018      Lizy Diamond MD  [address]  [address]  [facility] [address]           [facility] Hematology Oncology  [address]  [address]  [place] [facility] [address]  Phone: [phone]  Fax: [phone]          Patient: Janeth Siddiqi   MR Number: [id number]   YOB: 1980   Date of Visit: 3/21/2018       Dear Dr. Lizy Diamond:    Thank you for referring Janeth Siddiqi to me for evaluation. Attached you will find relevant portions of my assessment and plan of care.    If you have questions, please do not hesitate to call me. I look forward to following Janeth Siddiqi along with you.    Sincerely,    Miguel Hurst MD    Enclosure  CC:  No Recipients    If you would like to receive this communication electronically, please contact [email or web address] or [phone] to request more information on [employer] Link access.    For providers and/or their staff who would like to refer a patient to [facility], please contact us through our one-stop-shop provider referral line, [facility], at [phone].    If you feel you have received this communication in error or would no longer like to receive these types of communications, please e-mail [email or web address]

## 2018-05-10 ENCOUNTER — OFFICE VISIT (OUTPATIENT)
Dept: PULMONOLOGY | Facility: CLINIC | Age: 38
End: 2018-05-10
Payer: MEDICAID

## 2018-05-10 VITALS
BODY MASS INDEX: 18.16 KG/M2 | HEART RATE: 61 BPM | HEIGHT: 65 IN | OXYGEN SATURATION: 98 % | WEIGHT: 109 LBS | DIASTOLIC BLOOD PRESSURE: 60 MMHG | SYSTOLIC BLOOD PRESSURE: 88 MMHG

## 2018-05-10 DIAGNOSIS — R10.84 GENERALIZED ABDOMINAL PAIN: ICD-10-CM

## 2018-05-10 LAB
ALBUMIN SERPL-MCNC: 3.7 G/DL (ref 3.1–4.7)
ALP SERPL-CCNC: 35 IU/L (ref 40–104)
ALT (SGPT): 13 IU/L (ref 3–33)
AMYLASE SERPL-CCNC: 150 U/L (ref 28–100)
AST SERPL-CCNC: 17 IU/L (ref 10–40)
BASOPHILS NFR BLD: 0 K/UL (ref 0–0.2)
BASOPHILS NFR BLD: 0.4 %
BILIRUB SERPL-MCNC: 0.5 MG/DL (ref 0.3–1)
BUN SERPL-MCNC: 8 MG/DL (ref 8–20)
CALCIUM SERPL-MCNC: 8.7 MG/DL (ref 7.7–10.4)
CHLORIDE: 108 MMOL/L (ref 98–110)
CO2 SERPL-SCNC: 22.9 MMOL/L (ref 22.8–31.6)
CREATININE: 0.7 MG/DL (ref 0.6–1.4)
EOSINOPHIL NFR BLD: 0 K/UL (ref 0–0.7)
EOSINOPHIL NFR BLD: 0.4 %
ERYTHROCYTE [DISTWIDTH] IN BLOOD BY AUTOMATED COUNT: 14.2 % (ref 11.7–14.9)
GLUCOSE: 99 MG/DL (ref 70–99)
GRAN #: 3.5 K/UL (ref 1.4–6.5)
GRAN%: 61.5 %
HCT VFR BLD AUTO: 38.4 % (ref 36–48)
HGB BLD-MCNC: 13.1 G/DL (ref 12–15)
IMMATURE GRANS (ABS): 0 K/UL (ref 0–1)
IMMATURE GRANULOCYTES: 0.5 %
LIPASE SERPL-CCNC: 42 U/L (ref 0–38)
LYMPH #: 1.7 K/UL (ref 1.2–3.4)
LYMPH%: 29.4 %
MCH RBC QN AUTO: 31.2 PG (ref 25–35)
MCHC RBC AUTO-ENTMCNC: 34.1 G/DL (ref 31–36)
MCV RBC AUTO: 91.4 FL (ref 79–98)
MONO #: 0.4 K/UL (ref 0.1–0.6)
MONO%: 7.8 %
NUCLEATED RBCS: 0 %
PLATELET # BLD AUTO: 199 K/UL (ref 140–440)
PMV BLD AUTO: 9.8 FL (ref 8.8–12.7)
POTASSIUM SERPL-SCNC: 4.3 MMOL/L (ref 3.5–5)
PROT SERPL-MCNC: 6.5 G/DL (ref 6–8.2)
RBC # BLD AUTO: 4.2 M/UL (ref 3.5–5.5)
SODIUM: 139 MMOL/L (ref 134–144)
WBC # BLD AUTO: 5.6 K/UL (ref 5–10)

## 2018-05-10 PROCEDURE — 99214 OFFICE O/P EST MOD 30 MIN: CPT | Mod: ,,, | Performed by: INTERNAL MEDICINE

## 2018-05-10 RX ORDER — AZITHROMYCIN 250 MG/1
TABLET, FILM COATED ORAL
Qty: 6 TABLET | Refills: 0 | Status: SHIPPED | OUTPATIENT
Start: 2018-05-10 | End: 2018-08-30

## 2018-05-10 RX ORDER — PANCRELIPASE 24000; 76000; 120000 [USP'U]/1; [USP'U]/1; [USP'U]/1
CAPSULE, DELAYED RELEASE PELLETS ORAL
Refills: 11 | COMMUNITY
Start: 2018-04-18 | End: 2019-08-26

## 2018-05-10 RX ORDER — NORETHINDRONE ACETATE AND ETHINYL ESTRADIOL AND FERROUS FUMARATE 1MG-20(21)
KIT ORAL
Refills: 1 | COMMUNITY
Start: 2018-05-03 | End: 2020-07-21 | Stop reason: CLARIF

## 2018-05-10 NOTE — PROGRESS NOTES
"Office Visit    HPI:    9/13/2017 - Here for follow up, no new complaints, have reviewed numerous labs with pt and mother, PFT show moderate chest restriction and mild decrease in DLCO, CT chest shows no significant bronchiectasis but has ectasia of the ascending aorta (stable),  CF studies still pending (will call pt with results when available).  All questions answered.    11/28/2017 - Here for follow up, breathing has been ok but did have a coughing spell and some right rib discomfort.  Primary complaints are still GI episodes alternating episodes with chronic diarrhea ("yellow") and some constipation at times.  Also reports episodes of vomiting up undigested food.  No other new problems.    3/1/2018 - Here for follow up, feels better since starting pancreatic replacement (her primary ad GI MD like the idea).  Had flu about 1 month ago.  Still feels that she is having some pain at right lung base, was a heavy feeling worse with a deep breath.  No real increase in cough or congestion now.  Still with some episodes of vomiting.    5/10/2018 - Called for appointment over last 4 days developed cough and congestion, + URI symptoms (+ low grade fever, + nasal congestion, cough, doesn't feel well).  GI symptoms are predominating, has increased her CREON [place] to 36247 units per day.  Has appointment with a "pancreatic specialist" with [facility] in July. Had recent gastric emptying study but doesn't known results.    Medications      Current Outpatient Prescriptions:     CREON 24,000-76,000 -120,000 unit capsule, TK 1 C PO TID WC, Disp: , Rfl: 11    cyanocobalamin, vitamin B-12, (B-12 COMPLIANCE) 1,000 mcg/mL Kit, Inject 1,000 mcg as directed every 28 days., Disp: , Rfl:     ELIQUIS 5 mg Tab, TK 1 T PO  BID, Disp: , Rfl: 2    ergocalciferol (ERGOCALCIFEROL) 50,000 unit Cap, Take 1 capsule (50,000 Units total) by mouth every 7 days., Disp: 4 capsule, Rfl: 12    ferrous sulfate 324 mg (65 mg iron) TbEC, Take 1 tablet (324 mg " total) by mouth 2 (two) times daily., Disp: 60 tablet, Rfl: 3    fluoxetine (PROZAC) 20 MG capsule, Take 20 mg by mouth once daily., Disp: , Rfl:     JUNEL FE 1/20, 28, 1 mg-20 mcg (21)/75 mg (7) per tablet, TK 1 T PO QD, Disp: , Rfl: 1    LINZESS 290 mcg Cap, TK 1 C PO D, Disp: , Rfl: 1    multivitamin capsule, Take 1 capsule by mouth once daily., Disp: , Rfl:     penicillin v potassium (VEETID) 500 MG tablet, TK 1 T PO  TID, Disp: , Rfl: 0    promethazine (PHENERGAN) 25 MG suppository, Place 1 suppository (25 mg total) rectally every 6 (six) hours as needed for Nausea., Disp: 10 suppository, Rfl: 0    topiramate (TOPAMAX) 100 MG tablet, Take 1 tablet by mouth 3 (three) times daily., Disp: , Rfl: 1    trazodone (DESYREL) 150 MG tablet, TAKE 2 TS PO QD, Disp: , Rfl: 0    Allergies    Review of patient's allergies indicates:   Allergen Reactions    Iron analogues Anaphylaxis     Infusion only. Tolerates po    Bactrim [sulfamethoxazole-trimethoprim] Swelling    Lidocaine Swelling    Nsaids (non-steroidal anti-inflammatory drug)      cannot take NSAIDs- upsets the stomach    Zofran [ondansetron hcl (pf)] Nausea Only       Social History    History   Smoking Status    Never Smoker   Smokeless Tobacco    Never Used       ETOH - 0 drinks per week.    Drug Use - 0    Occupation - not working    Family History    Family History   Problem Relation Age of Onset    Breast cancer Mother 58    Colon cancer Maternal Grandmother 62    Prostate cancer Maternal Grandfather        ROS  Review of Systems   Constitutional: Positive for malaise/fatigue. Negative for chills, diaphoresis, fever and weight loss.   HENT: Negative for congestion.    Eyes: Negative for pain.   Respiratory: Negative for cough, hemoptysis, sputum production, shortness of breath, wheezing and stridor.    Cardiovascular: Negative for chest pain, palpitations, orthopnea, claudication, leg swelling and PND.   Gastrointestinal: Positive for  "constipation and nausea. Negative for abdominal pain, diarrhea, heartburn and vomiting.        Bloating   Genitourinary: Negative for dysuria, frequency and urgency.   Musculoskeletal: Negative for falls and myalgias.   Neurological: Negative for sensory change, focal weakness and weakness.   Psychiatric/Behavioral: Negative for depression. The patient is not nervous/anxious.        Physical Exam    Vitals:    05/10/18 1110   BP: (!) 88/60   Pulse: 61   SpO2: 98%   Weight: 49.4 kg (109 lb)   Height: 5' 5" (1.651 m)       Physical Exam   Constitutional: She is oriented to person, place, and time. She appears well-developed and well-nourished. No distress.   HENT:   Head: Normocephalic and atraumatic.   Nose: Nose normal.   Mouth/Throat: Oropharynx is clear and moist.   Eyes: Conjunctivae and EOM are normal. Pupils are equal, round, and reactive to light.   Neck: Normal range of motion. Neck supple. No JVD present. No tracheal deviation present. No thyromegaly present.   Cardiovascular: Normal rate, regular rhythm, normal heart sounds and intact distal pulses.  Exam reveals no gallop and no friction rub.    No murmur heard.  Pulmonary/Chest: Effort normal and breath sounds normal. No stridor. No respiratory distress. She has no wheezes. She has no rales. She exhibits no tenderness.   Abdominal: Soft. Bowel sounds are normal. She exhibits no distension. There is no tenderness.   Musculoskeletal: Normal range of motion. She exhibits no edema or tenderness.   + clubbing   Lymphadenopathy:     She has no cervical adenopathy.   Neurological: She is alert and oriented to person, place, and time. No cranial nerve deficit.   Skin: Skin is warm and dry. She is not diaphoretic.   Psychiatric: She has a normal mood and affect. Her behavior is normal.   Nursing note and vitals reviewed.      Lab    @RESUFAST (WBC,HGB,HCT,PLT)@    Sodium   Date Value Ref Range Status   03/14/2018 140 135 - 146 mmol/L Final   08/14/2017 138 134 - " 144 mmol/L      Potassium   Date Value Ref Range Status   03/14/2018 3.9 3.5 - 5.3 mmol/L Final     Chloride   Date Value Ref Range Status   03/14/2018 110 98 - 110 mmol/L Final   08/14/2017 106 98 - 110 mmol/L      CO2   Date Value Ref Range Status   03/14/2018 22 20 - 31 mmol/L Final     Glucose   Date Value Ref Range Status   03/14/2018 96 65 - 99 mg/dL Final     Comment:                   Fasting reference interval        08/14/2017 89 70 - 99 mg/dL      BUN, Bld   Date Value Ref Range Status   03/14/2018 10 7 - 25 mg/dL Final     BUN   Date Value Ref Range Status   12/11/2017 8 7 - 21 mg/dL Final     Creatinine   Date Value Ref Range Status   03/14/2018 0.84 0.50 - 1.10 mg/dL Final   08/14/2017 0.75 0.60 - 1.40 mg/dL      Calcium   Date Value Ref Range Status   03/14/2018 8.8 8.6 - 10.2 mg/dL Final     Total Protein   Date Value Ref Range Status   03/14/2018 6.8 6.1 - 8.1 g/dL Final     Albumin   Date Value Ref Range Status   03/14/2018 3.9 3.6 - 5.1 g/dL Final   08/14/2017 3.6 3.1 - 4.7 g/dL      Total Bilirubin   Date Value Ref Range Status   03/14/2018 0.4 0.2 - 1.2 mg/dL Final     Alkaline Phosphatase   Date Value Ref Range Status   03/14/2018 44 33 - 115 U/L Final     AST   Date Value Ref Range Status   03/14/2018 17 10 - 30 U/L Final     ALT   Date Value Ref Range Status   03/14/2018 16 6 - 29 U/L Final     Anion Gap   Date Value Ref Range Status   12/11/2017 18 9 - 18 mEq/L Final         Xray    CMS MANDATED QUALITY DATA - CT RADIATION ? 436    All CT scans at this facility utilize dose modulation, iterative reconstruction,  and/or weight based dosing when appropriate to reduce radiation dose to as low  as reasonably achievable.    CLINICAL HISTORY:  37 years (1980) Female R06.09/I26.99 patient has a history of shortness of  breath and cystic fibrosis.    TECHNIQUE:  CHEST W/O CONTRAST CT. 577 images obtained. CT scan of the chest was performed  without intravenous contrast.    CONTRAST:  No IV  contrast was administered    COMPARISON:  Radiograph the chest most recently from 12/06/2016, and CT of the chest from  08/21/2016    FINDINGS:  Evaluation of the solid organs is limited without intravenous contrast.    Visualized neck:The thyroid gland appears normal.  Lungs: The lungs are clear.  Airway: The trachea and central bronchial tree appear normal. There is no  significant bronchiectasis or bronchial wall thickening.  Pleura: There is no pleural effusion. There is no pneumothorax.  Cardiovascular: The heart is normal in size. There is no pericardial effusion.  The thoracic aorta is ectatic, measuring  3.2 cm in diameter, unchanged from  the previous exam  Mediastinum: There is no supraclavicular, axillary, mediastinal, or hilar  lymphadenopathy. The esophagus appears grossly normal.  Soft tissues: The peripheral soft tissues appear normal.  Musculoskeletal: The visualized osseous structures appear normal.  There are no  suspicious osseous lesions.  Upper Abdomen: The visualized upper abdominal organs appear normal.    IMPRESSION:  1. Mild ectasia of the ascending aorta unchanged from the previous exam  measuring up to 3.2 cm in diameter.  2. No significant bronchiectasis or bronchial wall thickening, and no acute  parenchymal or pleural abnormality identified.    Read and electronically signed by: Anju Linda MD on 8/15/2017 4:41 PM CDT      ANJU LINDA MD    Encounter   View Encounter          Signed by   Signed Credentials Date/Time    Phone Pager   ANJU LINDA MD 8/15/2017 16:43 [phone]          Impression/Plan    Problem List Items Addressed This Visit        Hematology    Pulmonary embolism - Primary    Antithrombin III deficiency  - aware       Endocrine    Malnutrition/malabsorption  - symptoms increased  - increase pancrease to 46457 units with meals for 1 week and see how she does  - check labs CMP, CBC, amylase, lipase  - check CT scan of abdomen  - may need HIDA scan        Other    Clubbing of fingers  - CF studies negative  - RTC 3 months    Dyspnea on exertion  - has chest restriction by PFT    Non-smoker      Other Visit Diagnoses    None.

## 2018-07-10 ENCOUNTER — OFFICE VISIT (OUTPATIENT)
Dept: PULMONOLOGY | Facility: CLINIC | Age: 38
End: 2018-07-10
Payer: MEDICAID

## 2018-07-10 VITALS
DIASTOLIC BLOOD PRESSURE: 62 MMHG | SYSTOLIC BLOOD PRESSURE: 88 MMHG | WEIGHT: 110 LBS | HEIGHT: 65 IN | BODY MASS INDEX: 18.33 KG/M2 | HEART RATE: 77 BPM | OXYGEN SATURATION: 99 %

## 2018-07-10 DIAGNOSIS — D68.59 ANTITHROMBIN III DEFICIENCY: ICD-10-CM

## 2018-07-10 DIAGNOSIS — R68.3 CLUBBING OF FINGERS: ICD-10-CM

## 2018-07-10 DIAGNOSIS — I26.99 OTHER PULMONARY EMBOLISM WITHOUT ACUTE COR PULMONALE, UNSPECIFIED CHRONICITY: Primary | ICD-10-CM

## 2018-07-10 DIAGNOSIS — R06.09 DYSPNEA ON EXERTION: ICD-10-CM

## 2018-07-10 PROCEDURE — 99214 OFFICE O/P EST MOD 30 MIN: CPT | Mod: ,,, | Performed by: INTERNAL MEDICINE

## 2018-07-10 NOTE — PROGRESS NOTES
"Office Visit    HPI:    9/13/2017 - Here for follow up, no new complaints, have reviewed numerous labs with pt and mother, PFT show moderate chest restriction and mild decrease in DLCO, CT chest shows no significant bronchiectasis but has ectasia of the ascending aorta (stable),  CF studies still pending (will call pt with results when available).  All questions answered.    11/28/2017 - Here for follow up, breathing has been ok but did have a coughing spell and some right rib discomfort.  Primary complaints are still GI episodes alternating episodes with chronic diarrhea ("yellow") and some constipation at times.  Also reports episodes of vomiting up undigested food.  No other new problems.    3/1/2018 - Here for follow up, feels better since starting pancreatic replacement (her primary ad GI MD like the idea).  Had flu about 1 month ago.  Still feels that she is having some pain at right lung base, was a heavy feeling worse with a deep breath.  No real increase in cough or congestion now.  Still with some episodes of vomiting.    5/10/2018 - Called for appointment over last 4 days developed cough and congestion, + URI symptoms (+ low grade fever, + nasal congestion, cough, doesn't feel well).  GI symptoms are predominating, has increased her CREON up to 87058 units per day.  Has appointment with a "pancreatic specialist" with [facility] in July. Had recent gastric emptying study but doesn't known results.    7/10/2018 - Here for follow up, has been diagnosed with gastroparesis.  To see pancrease specialist later this week.  Has had some episodes of chest pain a while back and had some SOB at that time.    Medications      Current Outpatient Prescriptions:     CREON 24,000-76,000 -120,000 unit capsule, TK 1 C PO TID WC, Disp: , Rfl: 11    cyanocobalamin, vitamin B-12, (B-12 COMPLIANCE) 1,000 mcg/mL Kit, Inject 1,000 mcg as directed every 28 days., Disp: , Rfl:     ELIQUIS 5 mg Tab, TK 1 T PO  BID, Disp: , Rfl: 2    " ergocalciferol (ERGOCALCIFEROL) 50,000 unit Cap, Take 1 capsule (50,000 Units total) by mouth every 7 days., Disp: 4 capsule, Rfl: 12    ferrous sulfate 324 mg (65 mg iron) TbEC, Take 1 tablet (324 mg total) by mouth 2 (two) times daily., Disp: 60 tablet, Rfl: 3    JUNEL FE 1/20, 28, 1 mg-20 mcg (21)/75 mg (7) per tablet, TK 1 T PO QD, Disp: , Rfl: 1    LINZESS 290 mcg Cap, TK 1 C PO D, Disp: , Rfl: 1    multivitamin capsule, Take 1 capsule by mouth once daily., Disp: , Rfl:     promethazine (PHENERGAN) 25 MG suppository, Place 1 suppository (25 mg total) rectally every 6 (six) hours as needed for Nausea., Disp: 10 suppository, Rfl: 0    topiramate (TOPAMAX) 100 MG tablet, Take 1 tablet by mouth 3 (three) times daily., Disp: , Rfl: 1    trazodone (DESYREL) 150 MG tablet, TAKE 2 TS PO QD, Disp: , Rfl: 0    azithromycin (ZITHROMAX Z-SUDHEER) 250 MG tablet, Take 2 po today then 1 a day for 4 days, Disp: 6 tablet, Rfl: 0    fluoxetine (PROZAC) 20 MG capsule, Take 20 mg by mouth once daily., Disp: , Rfl:     penicillin v potassium (VEETID) 500 MG tablet, TK 1 T PO  TID, Disp: , Rfl: 0    Allergies    Review of patient's allergies indicates:   Allergen Reactions    Iron analogues Anaphylaxis     Infusion only. Tolerates po    Bactrim [sulfamethoxazole-trimethoprim] Swelling    Lidocaine Swelling    Nsaids (non-steroidal anti-inflammatory drug)      cannot take NSAIDs- upsets the stomach    Zofran [ondansetron hcl (pf)] Nausea Only       Social History    History   Smoking Status    Never Smoker   Smokeless Tobacco    Never Used       ETOH - 0 drinks per week.    Drug Use - 0    Occupation - not working    Family History    Family History   Problem Relation Age of Onset    Breast cancer Mother 58    Colon cancer Maternal Grandmother 62    Prostate cancer Maternal Grandfather        ROS  Review of Systems   Constitutional: Positive for malaise/fatigue. Negative for chills, diaphoresis, fever and weight loss.  "  HENT: Negative for congestion.    Eyes: Negative for pain.   Respiratory: Negative for cough, hemoptysis, sputum production, shortness of breath, wheezing and stridor.    Cardiovascular: Negative for chest pain, palpitations, orthopnea, claudication, leg swelling and PND.   Gastrointestinal: Positive for constipation and nausea. Negative for abdominal pain, diarrhea, heartburn and vomiting.        Bloating   Genitourinary: Negative for dysuria, frequency and urgency.   Musculoskeletal: Negative for falls and myalgias.   Neurological: Negative for sensory change, focal weakness and weakness.   Psychiatric/Behavioral: Negative for depression. The patient is not nervous/anxious.        Physical Exam    Vitals:    07/10/18 1031   BP: (!) 88/62   Pulse: 77   SpO2: 99%   Weight: 49.9 kg (110 lb)   Height: 5' 5" (1.651 m)       Physical Exam   Constitutional: She is oriented to person, place, and time. She appears well-developed and well-nourished. No distress.   HENT:   Head: Normocephalic and atraumatic.   Nose: Nose normal.   Mouth/Throat: Oropharynx is clear and moist.   Eyes: Conjunctivae and EOM are normal. Pupils are equal, round, and reactive to light.   Neck: Normal range of motion. Neck supple. No JVD present. No tracheal deviation present. No thyromegaly present.   Cardiovascular: Normal rate, regular rhythm, normal heart sounds and intact distal pulses.  Exam reveals no gallop and no friction rub.    No murmur heard.  Pulmonary/Chest: Effort normal and breath sounds normal. No stridor. No respiratory distress. She has no wheezes. She has no rales. She exhibits no tenderness.   Abdominal: Soft. Bowel sounds are normal. She exhibits no distension. There is no tenderness.   Musculoskeletal: Normal range of motion. She exhibits no edema or tenderness.   + clubbing   Lymphadenopathy:     She has no cervical adenopathy.   Neurological: She is alert and oriented to person, place, and time. No cranial nerve deficit. "   Skin: Skin is warm and dry. She is not diaphoretic.   Psychiatric: She has a normal mood and affect. Her behavior is normal.   Nursing note and vitals reviewed.      Lab    @RESUFAST (WBC,HGB,HCT,PLT)@    Sodium   Date Value Ref Range Status   05/10/2018 139 134 - 144 mmol/L      Potassium   Date Value Ref Range Status   05/10/2018 4.3 3.5 - 5.0 mmol/L      Chloride   Date Value Ref Range Status   05/10/2018 108 98 - 110 mmol/L      CO2   Date Value Ref Range Status   05/10/2018 22.9 22.8 - 31.6 mmol/L      Glucose   Date Value Ref Range Status   05/10/2018 99 70 - 99 mg/dL      BUN, Bld   Date Value Ref Range Status   05/10/2018 8 8 - 20 mg/dL      BUN   Date Value Ref Range Status   12/11/2017 8 7 - 21 mg/dL Final     Creatinine   Date Value Ref Range Status   05/10/2018 0.70 0.60 - 1.40 mg/dL      Calcium   Date Value Ref Range Status   05/10/2018 8.7 7.7 - 10.4 mg/dL      Total Protein   Date Value Ref Range Status   05/10/2018 6.5 6.0 - 8.2 g/dL      Albumin   Date Value Ref Range Status   05/10/2018 3.7 3.1 - 4.7 g/dL      Total Bilirubin   Date Value Ref Range Status   05/10/2018 0.5 0.3 - 1.0 mg/dL      Alkaline Phosphatase   Date Value Ref Range Status   05/10/2018 35 (L) 40 - 104 IU/L      AST   Date Value Ref Range Status   05/10/2018 17 10 - 40 IU/L      ALT   Date Value Ref Range Status   03/14/2018 16 6 - 29 U/L Final     Anion Gap   Date Value Ref Range Status   12/11/2017 18 9 - 18 mEq/L Final         Xray    CMS MANDATED QUALITY DATA - CT RADIATION ? 436    All CT scans at this facility utilize dose modulation, iterative reconstruction,  and/or weight based dosing when appropriate to reduce radiation dose to as low  as reasonably achievable.    CLINICAL HISTORY:  37 years (1980) Female R06.09/I26.99 patient has a history of shortness of  breath and cystic fibrosis.    TECHNIQUE:  CHEST W/O CONTRAST CT. 577 images obtained. CT scan of the chest was performed  without intravenous  contrast.    CONTRAST:  No IV contrast was administered    COMPARISON:  Radiograph the chest most recently from 12/06/2016, and CT of the chest from  08/21/2016    FINDINGS:  Evaluation of the solid organs is limited without intravenous contrast.    Visualized neck:The thyroid gland appears normal.  Lungs: The lungs are clear.  Airway: The trachea and central bronchial tree appear normal. There is no  significant bronchiectasis or bronchial wall thickening.  Pleura: There is no pleural effusion. There is no pneumothorax.  Cardiovascular: The heart is normal in size. There is no pericardial effusion.  The thoracic aorta is ectatic, measuring  3.2 cm in diameter, unchanged from  the previous exam  Mediastinum: There is no supraclavicular, axillary, mediastinal, or hilar  lymphadenopathy. The esophagus appears grossly normal.  Soft tissues: The peripheral soft tissues appear normal.  Musculoskeletal: The visualized osseous structures appear normal.  There are no  suspicious osseous lesions.  Upper Abdomen: The visualized upper abdominal organs appear normal.    IMPRESSION:  1. Mild ectasia of the ascending aorta unchanged from the previous exam  measuring up to 3.2 cm in diameter.  2. No significant bronchiectasis or bronchial wall thickening, and no acute  parenchymal or pleural abnormality identified.    Read and electronically signed by: Anju Linda MD on 8/15/2017 4:41 PM CDT      ANJU LINDA MD    Encounter   View Encounter          Signed by   Signed Credentials Date/Time    Phone Pager   ANJU LINDA MD 8/15/2017 16:43 [phone]          Impression/Plan    Problem List Items Addressed This Visit        Hematology    Pulmonary embolism - Primary    Antithrombin III deficiency, MTHFR mutation  - aware       Endocrine    Malnutrition/malabsorption  - seems better  - has gained about 4 # since 11/2017  - to see pancrease specialist       Other    Clubbing of fingers  - CF studies negative  -  RTC 3 months      Dyspnea on exertion  - has chest restriction by PFT      Non-smoker      Other Visit Diagnoses    None.

## 2018-08-30 ENCOUNTER — OFFICE VISIT (OUTPATIENT)
Dept: HEMATOLOGY/ONCOLOGY | Facility: CLINIC | Age: 38
End: 2018-08-30
Payer: MEDICAID

## 2018-08-30 VITALS
DIASTOLIC BLOOD PRESSURE: 70 MMHG | WEIGHT: 113.81 LBS | HEART RATE: 64 BPM | RESPIRATION RATE: 18 BRPM | SYSTOLIC BLOOD PRESSURE: 102 MMHG | BODY MASS INDEX: 18.94 KG/M2 | TEMPERATURE: 99 F

## 2018-08-30 DIAGNOSIS — I26.99 OTHER PULMONARY EMBOLISM WITHOUT ACUTE COR PULMONALE, UNSPECIFIED CHRONICITY: Primary | ICD-10-CM

## 2018-08-30 DIAGNOSIS — E53.8 B12 DEFICIENCY: ICD-10-CM

## 2018-08-30 DIAGNOSIS — D50.8 OTHER IRON DEFICIENCY ANEMIA: ICD-10-CM

## 2018-08-30 DIAGNOSIS — Z15.89 MTHFR MUTATION: ICD-10-CM

## 2018-08-30 DIAGNOSIS — D68.59 ANTITHROMBIN III DEFICIENCY: ICD-10-CM

## 2018-08-30 PROCEDURE — 99213 OFFICE O/P EST LOW 20 MIN: CPT | Mod: ,,, | Performed by: INTERNAL MEDICINE

## 2018-08-30 RX ORDER — PANCRELIPASE LIPASE, PANCRELIPASE PROTEASE, PANCRELIPASE AMYLASE 40000; 126000; 168000 [USP'U]/1; [USP'U]/1; [USP'U]/1
CAPSULE, DELAYED RELEASE ORAL
Refills: 6 | COMMUNITY
Start: 2018-08-03 | End: 2020-07-21 | Stop reason: CLARIF

## 2018-08-30 NOTE — LETTER
August 30, 2018      Lizy Diamond MD  [address]  [address]  [place] [address]           [facility] Hematology [facility]  [address]  [address]  [place] [address]  Phone: [phone]  Fax: [phone]          Patient: Janeth Siddiqi   MR Number: [id number]   YOB: 1980   Date of Visit: 8/30/2018       Dear Dr. Lizy Diamond:    Thank you for referring Janeth Siddiqi to me for evaluation. Attached you will find relevant portions of my assessment and plan of care.    If you have questions, please do not hesitate to call me. I look forward to following Janeth Siddiqi along with you.    Sincerely,    Miguel Hurst MD    Enclosure  CC:  No Recipients    If you would like to receive this communication electronically, please contact [email or web address] or [phone] to request more information on [employer] access.    For providers and/or their staff who would like to refer a patient to [facility], please contact us through our one-stop-shop provider referral line, [facility], at [phone].    If you feel you have received this communication in error or would no longer like to receive these types of communications, please e-mail [email or web address]

## 2018-08-30 NOTE — PROGRESS NOTES
[facility] Hematology/Oncology  PROGRESS NOTE      Subjective:       Patient ID:   NAME: Janeth Siddiqi : 1980     38 y.o. female    Referring Doc: Lizy Diamond  Other Physicians: Francoise Noriega    Chief Complaint:  Anemia/b12 f/u    History of Present Illness:     Patient returns today for a regularly scheduled follow-up visit.  The patient is here with her mom and grandmother. She has been having menstrual bleeding and cramping which is stable. She is followed by GYN with Dr Oquendo. No CP, SOB, HA's or N/V. She self-discontinued the B12.       ROS:   GEN: normal without any fever, night sweats or weight loss  HEENT: normal with no HA's, sore throat, stiff neck, changes in vision  CV: normal with no CP, SOB, PND, KRAUSE or orthopnea  PULM: normal with no SOB, cough, hemoptysis, sputum or pleuritic pain  GI: normal with no abdominal pain, nausea, vomiting, constipation, diarrhea, melanotic stools, BRBPR, or hematemesis  : normal with no hematuria, dysuria  BREAST: normal with no mass, discharge, pain  SKIN: normal with no rash, erythema, bruising, or swelling    Allergies:  Review of patient's allergies indicates:   Allergen Reactions    Iron analogues Anaphylaxis     Infusion only. Tolerates po    Bactrim [sulfamethoxazole-trimethoprim] Swelling    Lidocaine Swelling    Nsaids (non-steroidal anti-inflammatory drug)      cannot take NSAIDs- upsets the stomach    Zofran [ondansetron hcl (pf)] Nausea Only       Medications:    Current Outpatient Medications:     cyanocobalamin, vitamin B-12, (B-12 COMPLIANCE) 1,000 mcg/mL Kit, Inject 1,000 mcg as directed every 28 days., Disp: , Rfl:     ELIQUIS 5 mg Tab, TK 1 T PO  BID, Disp: , Rfl: 2    ergocalciferol (ERGOCALCIFEROL) 50,000 unit Cap, Take 1 capsule (50,000 Units total) by mouth every 7 days., Disp: 4 capsule, Rfl: 12    ferrous sulfate 324 mg (65 mg iron) TbEC, Take 1 tablet (324 mg total) by mouth 2 (two) times daily., Disp: 60 tablet,  Rfl: 3    fluoxetine (PROZAC) 20 MG capsule, Take 20 mg by mouth once daily., Disp: , Rfl:     JUNEL FE 1/20, 28, 1 mg-20 mcg (21)/75 mg (7) per tablet, TK 1 T PO QD, Disp: , Rfl: 1    LINZESS 290 mcg Cap, TK 1 C PO D, Disp: , Rfl: 1    multivitamin capsule, Take 1 capsule by mouth once daily., Disp: , Rfl:     penicillin v potassium (VEETID) 500 MG tablet, TK 1 T PO  TID, Disp: , Rfl: 0    promethazine (PHENERGAN) 25 MG suppository, Place 1 suppository (25 mg total) rectally every 6 (six) hours as needed for Nausea., Disp: 10 suppository, Rfl: 0    topiramate (TOPAMAX) 100 MG tablet, Take 1 tablet by mouth 3 (three) times daily., Disp: , Rfl: 1    trazodone (DESYREL) 150 MG tablet, TAKE 2 TS PO QD, Disp: , Rfl: 0    CREON 24,000-76,000 -120,000 unit capsule, TK 1 C PO TID WC, Disp: , Rfl: 11    ZENPEP 40,000-126,000- 168,000 unit CpDR, TK 2 CS PO TID WITH MEALS, Disp: , Rfl: 6    PMHx/PSHx Updates:  See patient's last visit with me on 3/21/2018  See H&P on 12/7/16        Pathology:  none          Objective:     Vitals:  Blood pressure 120/80, pulse 72, temperature 99.1 °F (37.3 °C), resp. rate 18, weight 51.6 kg (113 lb 12.8 oz).    Physical Examination:   GEN: no apparent distress, comfortable; AAOx3  HEAD: atraumatic and normocephalic  EYES: no pallor, no icterus, PERRLA  ENT: OMM, no pharyngeal erythema, external ears WNL; no nasal discharge; no thrush  NECK: no masses, thyroid normal, trachea midline, no LAD/LN's, supple  CV: RRR with no murmur; normal pulse; normal S1 and S2; no pedal edema  CHEST: Normal respiratory effort; CTAB; normal breath sounds; no wheeze or crackles  ABDOM: nontender and nondistended; soft; normal bowel sounds; no rebound/guarding  MUSC/Skeletal: ROM normal; no crepitus; joints normal; no deformities or arthropathy  EXTREM: no clubbing, cyanosis, inflammation or swelling  SKIN: no rashes, lesions, ulcers, petechiae or subcutaneous nodules  : no reza  NEURO: grossly intact;  motor/sensory WNL; AAOx3; no tremors  PSYCH: normal mood, affect and behavior  LYMPH: normal cervical, supraclavicular, axillary and groin LN's            Labs:     8/27/2018  Lab Results   Component Value Date    WBC 5.6 05/10/2018    HGB 13.1 05/10/2018    HCT 38.4 05/10/2018    MCV 91.4 05/10/2018     05/10/2018     BMP  Lab Results   Component Value Date     05/10/2018    K 4.3 05/10/2018     05/10/2018    CO2 22.9 05/10/2018    BUN 8 05/10/2018    CREATININE 0.70 05/10/2018    CALCIUM 8.7 05/10/2018    ANIONGAP 18 12/11/2017    ESTGFRAFRICA 103 03/14/2018    EGFRNONAA 89 03/14/2018     Lab Results   Component Value Date    IRON 129 08/27/2018    TIBC 269 08/27/2018    FERRITIN 57 08/27/2018           Radiology/Diagnostic Studies:    X-ray Abdomen Flat And Erect    Result Date: 7/21/2017  EXAM: KUB. INDICATION: Obstruction. COMPARISON: KUB 1/1/14. FINDINGS: Supine and upright radiographs of the abdomen demonstrate a nonobstructive bowel gas pattern. There is no free air. There is no pneumatosis. There is no portal venous gas. There is calcification. Lung bases are clear. There is no osseous abnormality.    1.Nonobstructed bowel gas pattern. Electronically signed by: KELBY DENNIS MD Date:     07/21/17 Time:    13:49     Ct Abdomen Pelvis With Contrast    Result Date: 7/21/2017  Procedure: CT of the abdomen and pelvis with IV contrast. Technique: Axial images of the abdomen and pelvis were obtained with intravenous contrast administration. Coronal and sagittal reconstructions were provided. Total DLP was 691 mGy-cm.  Dose lowering technique, automated exposure control, was utilized for this exam. History: Left-sided abdominal pain and vomiting. Comparison: CT of the abdomen and pelvis 7/31/2015. Findings: The bilateral lung bases are clear. The heart is normal in size. There is a 7 mm focus of arterial enhancement within segment 4B of the liver which is isodense to the adjacent liver on delayed  phase. This most consistent with a flash filling hemangioma. The portal vein is patent. The spleen, pancreas, and adrenal glands are normal. Bilateral kidneys are normal. There is no hydronephrosis or nephrolithiasis. The stomach and small bowel are decompressed. The appendix is not visualized, however there is no right lower quadrant inflammatory stranding. The colon is normal. The uterus and adnexa are normal for age. Urinary bladder is normal. There is trace physiologic free fluid in the lower pelvis. There is no pelvic or retroperitoneal adenopathy. The abdominal aorta is non-aneurysmal. There is no lytic or blastic osseous lesions.     No acute abnormality of the abdomen and pelvis. Electronically signed by: TINA DENNIS MD Date:     07/21/17 Time:    15:37       I have reviewed all available lab results and radiology reports.    Assessment/Plan:   (1) 38 y.o. female with diagnosis of iron deficiency anemia and B12 deficiency  - she was previously on oral iron   - latest ferritin is 57 and much better  - B12 adeqaute  - hgb is 14.0    (2) prior Pulmonary emboli with prior use of OCP's  - on oral anticoagulant drug Eliquis  - underlying MTHFR-A heterozygous and ATIII deficiency issues  - followed by Dr Mccray with Pulm and he ordered several tests and  workup for cystic fibrosis which was negative    (3) Crohn's disorder/pancreatitis - followed by Dr Noriega with GI in past and now Dr tina Sinclair    (4) Chronic GYN bleeding - followed by Dr Gigi Oquendo with GYN  - latest hgb wnl  - she is on OCP's still and thus she needs to probably remain on the blood thinners    (5) prior Vit D deficiency    (6) She is being seen by Dr Khoobehi with plastics in [place] and had a Voluma skin filler without any difficulties    1. Other pulmonary embolism without acute cor pulmonale, unspecified chronicity     2. Antithrombin III deficiency     3. Other iron deficiency anemia     4. MTHFR mutation     5. B12 deficiency          PLAN:  1. Check labs every 3 months  2. F/u with PCP , GYN, and pulm  3. RTC  in 6 months  Fax note to Schuette, Striplin, Khoobehi, Kate Brown, [place]    Discussion:     I have explained all of the above in detail and the patient understands all of the current recommendation(s). I have answered all of their questions to the best of my ability and to their complete satisfaction.   The patient is to continue with the current management plan.            Electronically signed by Miguel Hurst MD

## 2018-09-13 RX ORDER — ERGOCALCIFEROL 1.25 MG/1
CAPSULE ORAL
Qty: 4 CAPSULE | Refills: 6 | Status: SHIPPED | OUTPATIENT
Start: 2018-09-13 | End: 2019-09-18 | Stop reason: SDUPTHER

## 2018-09-14 LAB
ALBUMIN SERPL-MCNC: 3.8 G/DL (ref 3.6–5.1)
ALBUMIN/GLOB SERPL: 1.3 (CALC) (ref 1–2.5)
ALP SERPL-CCNC: 39 U/L (ref 33–115)
ALT SERPL-CCNC: 11 U/L (ref 6–29)
AST SERPL-CCNC: 13 U/L (ref 10–30)
BASOPHILS # BLD AUTO: 39 CELLS/UL (ref 0–200)
BASOPHILS NFR BLD AUTO: 1 %
BILIRUB SERPL-MCNC: 0.5 MG/DL (ref 0.2–1.2)
BUN SERPL-MCNC: 11 MG/DL (ref 7–25)
BUN/CREAT SERPL: NORMAL (CALC) (ref 6–22)
CALCIUM SERPL-MCNC: 8.8 MG/DL (ref 8.6–10.2)
CHLORIDE SERPL-SCNC: 110 MMOL/L (ref 98–110)
CO2 SERPL-SCNC: 23 MMOL/L (ref 20–32)
CREAT SERPL-MCNC: 0.86 MG/DL (ref 0.5–1.1)
EOSINOPHIL # BLD AUTO: 31 CELLS/UL (ref 15–500)
EOSINOPHIL NFR BLD AUTO: 0.8 %
ERYTHROCYTE [DISTWIDTH] IN BLOOD BY AUTOMATED COUNT: 12.3 % (ref 11–15)
FERRITIN SERPL-MCNC: 75 NG/ML (ref 10–154)
FOLATE SERPL-MCNC: >24 NG/ML
GFR SERPL CREATININE-BSD FRML MDRD: 86 ML/MIN/1.73M2
GLOBULIN SER CALC-MCNC: 3 G/DL (CALC) (ref 1.9–3.7)
GLUCOSE SERPL-MCNC: 82 MG/DL (ref 65–99)
HCT VFR BLD AUTO: 40.5 % (ref 35–45)
HGB BLD-MCNC: 13.5 G/DL (ref 11.7–15.5)
IRON SATN MFR SERPL: 46 % (CALC) (ref 11–50)
IRON SERPL-MCNC: 119 MCG/DL (ref 40–190)
LYMPHOCYTES # BLD AUTO: 1856 CELLS/UL (ref 850–3900)
LYMPHOCYTES NFR BLD AUTO: 47.6 %
MCH RBC QN AUTO: 30.2 PG (ref 27–33)
MCHC RBC AUTO-ENTMCNC: 33.3 G/DL (ref 32–36)
MCV RBC AUTO: 90.6 FL (ref 80–100)
MONOCYTES # BLD AUTO: 320 CELLS/UL (ref 200–950)
MONOCYTES NFR BLD AUTO: 8.2 %
NEUTROPHILS # BLD AUTO: 1654 CELLS/UL (ref 1500–7800)
NEUTROPHILS NFR BLD AUTO: 42.4 %
PLATELET # BLD AUTO: 255 THOUSAND/UL (ref 140–400)
PMV BLD REES-ECKER: 10.5 FL (ref 7.5–12.5)
POTASSIUM SERPL-SCNC: 4.4 MMOL/L (ref 3.5–5.3)
PROT SERPL-MCNC: 6.8 G/DL (ref 6.1–8.1)
RBC # BLD AUTO: 4.47 MILLION/UL (ref 3.8–5.1)
SODIUM SERPL-SCNC: 140 MMOL/L (ref 135–146)
TIBC SERPL-MCNC: 257 MCG/DL (CALC) (ref 250–450)
VIT B12 SERPL-MCNC: 1447 PG/ML (ref 200–1100)
WBC # BLD AUTO: 3.9 THOUSAND/UL (ref 3.8–10.8)

## 2018-10-10 ENCOUNTER — OFFICE VISIT (OUTPATIENT)
Dept: PULMONOLOGY | Facility: CLINIC | Age: 38
End: 2018-10-10
Payer: MEDICAID

## 2018-10-10 VITALS
HEIGHT: 65 IN | WEIGHT: 111.88 LBS | SYSTOLIC BLOOD PRESSURE: 108 MMHG | DIASTOLIC BLOOD PRESSURE: 62 MMHG | OXYGEN SATURATION: 92 % | BODY MASS INDEX: 18.64 KG/M2

## 2018-10-10 DIAGNOSIS — R06.02 SHORTNESS OF BREATH: ICD-10-CM

## 2018-10-10 DIAGNOSIS — R68.3 CLUBBING OF FINGERS: ICD-10-CM

## 2018-10-10 DIAGNOSIS — I26.99 OTHER PULMONARY EMBOLISM WITHOUT ACUTE COR PULMONALE, UNSPECIFIED CHRONICITY: Primary | ICD-10-CM

## 2018-10-10 PROCEDURE — 99214 OFFICE O/P EST MOD 30 MIN: CPT | Mod: ,,, | Performed by: INTERNAL MEDICINE

## 2018-10-10 NOTE — PROGRESS NOTES
"Office Visit    HPI:    9/13/2017 - Here for follow up, no new complaints, have reviewed numerous labs with pt and mother, PFT show moderate chest restriction and mild decrease in DLCO, CT chest shows no significant bronchiectasis but has ectasia of the ascending aorta (stable),  CF studies still pending (will call pt with results when available).  All questions answered.    11/28/2017 - Here for follow up, breathing has been ok but did have a coughing spell and some right rib discomfort.  Primary complaints are still GI episodes alternating episodes with chronic diarrhea ("yellow") and some constipation at times.  Also reports episodes of vomiting up undigested food.  No other new problems.    3/1/2018 - Here for follow up, feels better since starting pancreatic replacement (her primary ad GI MD like the idea).  Had flu about 1 month ago.  Still feels that she is having some pain at right lung base, was a heavy feeling worse with a deep breath.  No real increase in cough or congestion now.  Still with some episodes of vomiting.    5/10/2018 - Called for appointment over last 4 days developed cough and congestion, + URI symptoms (+ low grade fever, + nasal congestion, cough, doesn't feel well).  GI symptoms are predominating, has increased her CREON up to 28074 units per day.  Has appointment with a "pancreatic specialist" with [facility] in July. Had recent gastric emptying study but doesn't known results.    7/10/2018 - Here for follow up, has been diagnosed with gastroparesis.  To see pancrease specialist later this week.  Has had some episodes of chest pain a while back and had some SOB at that time.    10/10/2018 - Here for follow up, has had some issues with exertional dyspnea and possible palpitations,  Saw Dr Silveira and had stress test and says that it was "ok" but she "underperformed".  She has chronic sinus complaints and cough - constant nonproductive.  Also has some back pain (lower thoracic) always present " ""I can't describe it but dullish".  Has not missed any ELIQUIS doses.  Denies any tightness or wheezing.  She did go to [place] [facility].  Symptoms have been present for about 2 months - not worse but not better.    Medications      Current Outpatient Medications:     CREON 24,000-76,000 -120,000 unit capsule, TK 1 C PO TID WC, Disp: , Rfl: 11    cyanocobalamin, vitamin B-12, (B-12 COMPLIANCE) 1,000 mcg/mL Kit, Inject 1,000 mcg as directed every 28 days., Disp: , Rfl:     ELIQUIS 5 mg Tab, TK 1 T PO  BID, Disp: , Rfl: 2    ergocalciferol (ERGOCALCIFEROL) 50,000 unit Cap, TAKE 1 CAPSULE BY MOUTH EVERY 7 DAYS, Disp: 4 capsule, Rfl: 6    ferrous sulfate 324 mg (65 mg iron) TbEC, Take 1 tablet (324 mg total) by mouth 2 (two) times daily., Disp: 60 tablet, Rfl: 3    fluoxetine (PROZAC) 20 MG capsule, Take 20 mg by mouth once daily., Disp: , Rfl:     JUNEL FE 1/20, 28, 1 mg-20 mcg (21)/75 mg (7) per tablet, TK 1 T PO QD, Disp: , Rfl: 1    LINZESS 290 mcg Cap, TK 1 C PO D, Disp: , Rfl: 1    multivitamin capsule, Take 1 capsule by mouth once daily., Disp: , Rfl:     penicillin v potassium (VEETID) 500 MG tablet, TK 1 T PO  TID, Disp: , Rfl: 0    promethazine (PHENERGAN) 25 MG suppository, Place 1 suppository (25 mg total) rectally every 6 (six) hours as needed for Nausea., Disp: 10 suppository, Rfl: 0    topiramate (TOPAMAX) 100 MG tablet, Take 1 tablet by mouth 3 (three) times daily., Disp: , Rfl: 1    trazodone (DESYREL) 150 MG tablet, TAKE 2 TS PO QD, Disp: , Rfl: 0    ZENPEP 40,000-126,000- 168,000 unit CpDR, TK 2 CS PO TID WITH MEALS, Disp: , Rfl: 6    Allergies    Review of patient's allergies indicates:   Allergen Reactions    Iron analogues Anaphylaxis     Infusion only. Tolerates po    Bactrim [sulfamethoxazole-trimethoprim] Swelling    Lidocaine Swelling    Nsaids (non-steroidal anti-inflammatory drug)      cannot take NSAIDs- upsets the stomach    Zofran [ondansetron hcl (pf)] Nausea Only " "      Social History    Social History     Tobacco Use   Smoking Status Never Smoker   Smokeless Tobacco Never Used       ETOH - 0 drinks per week.    Drug Use - 0    Occupation - not working    Family History    Family History   Problem Relation Age of Onset    Breast cancer Mother 58    Colon cancer Maternal Grandmother 62    Prostate cancer Maternal Grandfather        ROS  Review of Systems   Constitutional: Positive for malaise/fatigue. Negative for chills, diaphoresis, fever and weight loss.   HENT: Negative for congestion.    Eyes: Negative for pain.   Respiratory: Negative for cough, hemoptysis, sputum production, shortness of breath, wheezing and stridor.    Cardiovascular: Negative for chest pain, palpitations, orthopnea, claudication, leg swelling and PND.   Gastrointestinal: Positive for constipation and nausea. Negative for abdominal pain, diarrhea, heartburn and vomiting.        Bloating   Genitourinary: Negative for dysuria, frequency and urgency.   Musculoskeletal: Negative for falls and myalgias.   Neurological: Negative for sensory change, focal weakness and weakness.   Psychiatric/Behavioral: Negative for depression. The patient is not nervous/anxious.        Physical Exam    Vitals:    10/10/18 1136   BP: 108/62   SpO2: (!) 92%   Weight: 50.8 kg (111 lb 14.4 oz)   Height: 5' 5" (1.651 m)       Physical Exam   Constitutional: She is oriented to person, place, and time. She appears well-developed and well-nourished. No distress.   HENT:   Head: Normocephalic and atraumatic.   Nose: Nose normal.   Mouth/Throat: Oropharynx is clear and moist.   Eyes: Conjunctivae and EOM are normal. Pupils are equal, round, and reactive to light.   Neck: Normal range of motion. Neck supple. No JVD present. No tracheal deviation present. No thyromegaly present.   Cardiovascular: Normal rate, regular rhythm, normal heart sounds and intact distal pulses. Exam reveals no gallop and no friction rub.   No murmur " heard.  Pulmonary/Chest: Effort normal and breath sounds normal. No stridor. No respiratory distress. She has no wheezes. She has no rales. She exhibits no tenderness.   Abdominal: Soft. Bowel sounds are normal. She exhibits no distension. There is no tenderness.   Musculoskeletal: Normal range of motion. She exhibits no edema or tenderness.   + clubbing   Lymphadenopathy:     She has no cervical adenopathy.   Neurological: She is alert and oriented to person, place, and time. No cranial nerve deficit.   Skin: Skin is warm and dry. She is not diaphoretic.   Psychiatric: She has a normal mood and affect. Her behavior is normal.   Nursing note and vitals reviewed.      Lab    @RESUFAST (WBC,HGB,HCT,PLT)@    Sodium   Date Value Ref Range Status   09/13/2018 140 135 - 146 mmol/L Final   05/10/2018 139 134 - 144 mmol/L      Potassium   Date Value Ref Range Status   09/13/2018 4.4 3.5 - 5.3 mmol/L Final     Chloride   Date Value Ref Range Status   09/13/2018 110 98 - 110 mmol/L Final   05/10/2018 108 98 - 110 mmol/L      CO2   Date Value Ref Range Status   09/13/2018 23 20 - 32 mmol/L Final     Glucose   Date Value Ref Range Status   09/13/2018 82 65 - 99 mg/dL Final     Comment:                   Fasting reference interval        05/10/2018 99 70 - 99 mg/dL      BUN, Bld   Date Value Ref Range Status   09/13/2018 11 7 - 25 mg/dL Final     BUN   Date Value Ref Range Status   12/11/2017 8 7 - 21 mg/dL Final     Creatinine   Date Value Ref Range Status   09/13/2018 0.86 0.50 - 1.10 mg/dL Final   05/10/2018 0.70 0.60 - 1.40 mg/dL      Calcium   Date Value Ref Range Status   09/13/2018 8.8 8.6 - 10.2 mg/dL Final     Total Protein   Date Value Ref Range Status   09/13/2018 6.8 6.1 - 8.1 g/dL Final     Albumin   Date Value Ref Range Status   09/13/2018 3.8 3.6 - 5.1 g/dL Final   05/10/2018 3.7 3.1 - 4.7 g/dL      Total Bilirubin   Date Value Ref Range Status   09/13/2018 0.5 0.2 - 1.2 mg/dL Final     Alkaline Phosphatase   Date  Value Ref Range Status   09/13/2018 39 33 - 115 U/L Final     AST   Date Value Ref Range Status   09/13/2018 13 10 - 30 U/L Final     ALT   Date Value Ref Range Status   09/13/2018 11 6 - 29 U/L Final     Anion Gap   Date Value Ref Range Status   12/11/2017 18 9 - 18 mEq/L Final         Xray    CMS MANDATED QUALITY DATA - CT RADIATION ? 436    All CT scans at this facility utilize dose modulation, iterative reconstruction,  and/or weight based dosing when appropriate to reduce radiation dose to as low  as reasonably achievable.    CLINICAL HISTORY:  37 years (1980) Female R06.09/I26.99 patient has a history of shortness of  breath and cystic fibrosis.    TECHNIQUE:  CHEST W/O CONTRAST CT. 577 images obtained. CT scan of the chest was performed  without intravenous contrast.    CONTRAST:  No IV contrast was administered    COMPARISON:  Radiograph the chest most recently from 12/06/2016, and CT of the chest from  08/21/2016    FINDINGS:  Evaluation of the solid organs is limited without intravenous contrast.    Visualized neck:The thyroid gland appears normal.  Lungs: The lungs are clear.  Airway: The trachea and central bronchial tree appear normal. There is no  significant bronchiectasis or bronchial wall thickening.  Pleura: There is no pleural effusion. There is no pneumothorax.  Cardiovascular: The heart is normal in size. There is no pericardial effusion.  The thoracic aorta is ectatic, measuring  3.2 cm in diameter, unchanged from  the previous exam  Mediastinum: There is no supraclavicular, axillary, mediastinal, or hilar  lymphadenopathy. The esophagus appears grossly normal.  Soft tissues: The peripheral soft tissues appear normal.  Musculoskeletal: The visualized osseous structures appear normal.  There are no  suspicious osseous lesions.  Upper Abdomen: The visualized upper abdominal organs appear normal.    IMPRESSION:  1. Mild ectasia of the ascending aorta unchanged from the previous  exam  measuring up to 3.2 cm in diameter.  2. No significant bronchiectasis or bronchial wall thickening, and no acute  parenchymal or pleural abnormality identified.    Read and electronically signed by: Anju Linda MD on 8/15/2017 4:41 PM CDT      ANJU LINDA MD    Encounter   View Encounter          Signed by   Signed Credentials Date/Time    Phone Pager   ANJU LINDA MD 8/15/2017 16:43 [phone]          Impression/Plan    Problem List Items Addressed This Visit        Hematology    Pulmonary embolism - Primary    Antithrombin III deficiency, MTHFR mutation  - aware  - on Eliquis       Endocrine    Malnutrition/malabsorption  - seems better  - has gained about 5 # since 11/2017  - to see pancrease specialist       Other    Clubbing of fingers  - CF studies negative  - RTC 3 months      Dyspnea on exertion  - has chest restriction by PFT  - has increased symptoms will recheck PFT to see if there is any difference (may consider methacholine challenge)  - will request records from [place] ER visit and Dr Silveira      Non-smoker      Other Visit Diagnoses    None.       '

## 2018-11-13 ENCOUNTER — TELEPHONE (OUTPATIENT)
Dept: PULMONOLOGY | Facility: CLINIC | Age: 38
End: 2018-11-13

## 2019-01-08 ENCOUNTER — OFFICE VISIT (OUTPATIENT)
Dept: PULMONOLOGY | Facility: CLINIC | Age: 39
End: 2019-01-08
Payer: MEDICAID

## 2019-01-08 VITALS
HEIGHT: 65 IN | DIASTOLIC BLOOD PRESSURE: 66 MMHG | BODY MASS INDEX: 18.33 KG/M2 | WEIGHT: 110 LBS | SYSTOLIC BLOOD PRESSURE: 120 MMHG

## 2019-01-08 DIAGNOSIS — R68.3 CLUBBING OF FINGERS: ICD-10-CM

## 2019-01-08 DIAGNOSIS — I26.99 OTHER PULMONARY EMBOLISM WITHOUT ACUTE COR PULMONALE, UNSPECIFIED CHRONICITY: Primary | ICD-10-CM

## 2019-01-08 DIAGNOSIS — D68.59 ANTITHROMBIN III DEFICIENCY: ICD-10-CM

## 2019-01-08 DIAGNOSIS — R06.02 SHORTNESS OF BREATH: ICD-10-CM

## 2019-01-08 PROCEDURE — 99214 OFFICE O/P EST MOD 30 MIN: CPT | Mod: ,,, | Performed by: INTERNAL MEDICINE

## 2019-01-08 PROCEDURE — 99214 PR OFFICE/OUTPT VISIT, EST, LEVL IV, 30-39 MIN: ICD-10-PCS | Mod: ,,, | Performed by: INTERNAL MEDICINE

## 2019-01-08 RX ORDER — PREDNISONE 20 MG/1
TABLET ORAL
Refills: 0 | COMMUNITY
Start: 2018-12-27 | End: 2019-08-26

## 2019-01-08 NOTE — PROGRESS NOTES
"Office Visit    HPI:    9/13/2017 - Here for follow up, no new complaints, have reviewed numerous labs with pt and mother, PFT show moderate chest restriction and mild decrease in DLCO, CT chest shows no significant bronchiectasis but has ectasia of the ascending aorta (stable),  CF studies still pending (will call pt with results when available).  All questions answered.    11/28/2017 - Here for follow up, breathing has been ok but did have a coughing spell and some right rib discomfort.  Primary complaints are still GI episodes alternating episodes with chronic diarrhea ("yellow") and some constipation at times.  Also reports episodes of vomiting up undigested food.  No other new problems.    3/1/2018 - Here for follow up, feels better since starting pancreatic replacement (her primary ad GI MD like the idea).  Had flu about 1 month ago.  Still feels that she is having some pain at right lung base, was a heavy feeling worse with a deep breath.  No real increase in cough or congestion now.  Still with some episodes of vomiting.    5/10/2018 - Called for appointment over last 4 days developed cough and congestion, + URI symptoms (+ low grade fever, + nasal congestion, cough, doesn't feel well).  GI symptoms are predominating, has increased her CREON [place] to 13196 units per day.  Has appointment with a "pancreatic specialist" with [facility] in July. Had recent gastric emptying study but doesn't known results.    7/10/2018 - Here for follow up, has been diagnosed with gastroparesis.  To see pancrease specialist later this week.  Has had some episodes of chest pain a while back and had some SOB at that time.    10/10/2018 - Here for follow up, has had some issues with exertional dyspnea and possible palpitations,  Saw Dr Silveira and had stress test and says that it was "ok" but she "underperformed".  She has chronic sinus complaints and cough - constant nonproductive.  Also has some back pain (lower thoracic) always present " ""I can't describe it but dullish".  Has not missed any ELIQUIS doses.  Denies any tightness or wheezing.  She did go to [place] [facility].  Symptoms have been present for about 2 months - not worse but not better.    1/8/2019 - Here for follow up, was in ER around [place] for angioedema (sent home with steroids), better now.  GI is considering whether she may need subtotal colectomy but is to see a rheumatologist for evaluation.  Breathing has been OK but has occasional pain in right posterior chest.    Medications      Current Outpatient Medications:     CREON 24,000-76,000 -120,000 unit capsule, TK 1 C PO TID WC, Disp: , Rfl: 11    cyanocobalamin, vitamin B-12, (B-12 COMPLIANCE) 1,000 mcg/mL Kit, Inject 1,000 mcg as directed every 28 days., Disp: , Rfl:     ELIQUIS 5 mg Tab, TK 1 T PO  BID, Disp: , Rfl: 2    ergocalciferol (ERGOCALCIFEROL) 50,000 unit Cap, TAKE 1 CAPSULE BY MOUTH EVERY 7 DAYS, Disp: 4 capsule, Rfl: 6    ferrous sulfate 324 mg (65 mg iron) TbEC, Take 1 tablet (324 mg total) by mouth 2 (two) times daily., Disp: 60 tablet, Rfl: 3    fluoxetine (PROZAC) 20 MG capsule, Take 20 mg by mouth once daily., Disp: , Rfl:     JUNEL FE 1/20, 28, 1 mg-20 mcg (21)/75 mg (7) per tablet, TK 1 T PO QD, Disp: , Rfl: 1    LINZESS 290 mcg Cap, TK 1 C PO D, Disp: , Rfl: 1    multivitamin capsule, Take 1 capsule by mouth once daily., Disp: , Rfl:     penicillin v potassium (VEETID) 500 MG tablet, TK 1 T PO  TID, Disp: , Rfl: 0    predniSONE (DELTASONE) 20 MG tablet, TK 3 TS PO ONCE DAILY FOR 5 DAYS, Disp: , Rfl: 0    promethazine (PHENERGAN) 25 MG suppository, Place 1 suppository (25 mg total) rectally every 6 (six) hours as needed for Nausea., Disp: 10 suppository, Rfl: 0    topiramate (TOPAMAX) 100 MG tablet, Take 1 tablet by mouth 3 (three) times daily., Disp: , Rfl: 1    trazodone (DESYREL) 150 MG tablet, TAKE 2 TS PO QD, Disp: , Rfl: 0    ZENPEP 40,000-126,000- 168,000 unit CpDR, TK 2 CS PO TID WITH " "MEALS, Disp: , Rfl: 6    Allergies    Review of patient's allergies indicates:   Allergen Reactions    Iron analogues Anaphylaxis     Infusion only. Tolerates po    Bactrim [sulfamethoxazole-trimethoprim] Swelling    Lidocaine Swelling    Nsaids (non-steroidal anti-inflammatory drug)      cannot take NSAIDs- upsets the stomach    Zofran [ondansetron hcl (pf)] Nausea Only       Social History    Social History     Tobacco Use   Smoking Status Never Smoker   Smokeless Tobacco Never Used       ETOH - 0 drinks per week.    Drug Use - 0    Occupation - not working    Family History    Family History   Problem Relation Age of Onset    Breast cancer Mother 58    Colon cancer Maternal Grandmother 62    Prostate cancer Maternal Grandfather        ROS  Review of Systems   Constitutional: Positive for malaise/fatigue. Negative for chills, diaphoresis, fever and weight loss.   HENT: Negative for congestion.    Eyes: Negative for pain.   Respiratory: Negative for cough, hemoptysis, sputum production, shortness of breath, wheezing and stridor.    Cardiovascular: Negative for chest pain, palpitations, orthopnea, claudication, leg swelling and PND.   Gastrointestinal: Positive for constipation and nausea. Negative for abdominal pain, diarrhea, heartburn and vomiting.        Bloating   Genitourinary: Negative for dysuria, frequency and urgency.   Musculoskeletal: Negative for falls and myalgias.   Neurological: Negative for sensory change, focal weakness and weakness.   Psychiatric/Behavioral: Negative for depression. The patient is not nervous/anxious.        Physical Exam    Vitals:    01/08/19 1005   BP: 120/66   Weight: 49.9 kg (110 lb)   Height: 5' 5" (1.651 m)       Physical Exam   Constitutional: She is oriented to person, place, and time. She appears well-developed and well-nourished. No distress.   HENT:   Head: Normocephalic and atraumatic.   Nose: Nose normal.   Mouth/Throat: Oropharynx is clear and moist.   Eyes: " Conjunctivae and EOM are normal. Pupils are equal, round, and reactive to light.   Neck: Normal range of motion. Neck supple. No JVD present. No tracheal deviation present. No thyromegaly present.   Cardiovascular: Normal rate, regular rhythm, normal heart sounds and intact distal pulses. Exam reveals no gallop and no friction rub.   No murmur heard.  Pulmonary/Chest: Effort normal and breath sounds normal. No stridor. No respiratory distress. She has no wheezes. She has no rales. She exhibits no tenderness.   Abdominal: Soft. Bowel sounds are normal. She exhibits no distension. There is no tenderness.   Musculoskeletal: Normal range of motion. She exhibits no edema or tenderness.   + clubbing   Lymphadenopathy:     She has no cervical adenopathy.   Neurological: She is alert and oriented to person, place, and time. No cranial nerve deficit.   Skin: Skin is warm and dry. She is not diaphoretic.   Psychiatric: She has a normal mood and affect. Her behavior is normal.   Nursing note and vitals reviewed.      Lab    @RESUFAST (WBC,HGB,HCT,PLT)@    Sodium   Date Value Ref Range Status   09/13/2018 140 135 - 146 mmol/L Final   05/10/2018 139 134 - 144 mmol/L      Potassium   Date Value Ref Range Status   09/13/2018 4.4 3.5 - 5.3 mmol/L Final     Chloride   Date Value Ref Range Status   09/13/2018 110 98 - 110 mmol/L Final   05/10/2018 108 98 - 110 mmol/L      CO2   Date Value Ref Range Status   09/13/2018 23 20 - 32 mmol/L Final     Glucose   Date Value Ref Range Status   09/13/2018 82 65 - 99 mg/dL Final     Comment:                   Fasting reference interval        05/10/2018 99 70 - 99 mg/dL      BUN, Bld   Date Value Ref Range Status   09/13/2018 11 7 - 25 mg/dL Final     BUN   Date Value Ref Range Status   12/11/2017 8 7 - 21 mg/dL Final     Creatinine   Date Value Ref Range Status   09/13/2018 0.86 0.50 - 1.10 mg/dL Final   05/10/2018 0.70 0.60 - 1.40 mg/dL      Calcium   Date Value Ref Range Status   09/13/2018  8.8 8.6 - 10.2 mg/dL Final     Total Protein   Date Value Ref Range Status   09/13/2018 6.8 6.1 - 8.1 g/dL Final     Albumin   Date Value Ref Range Status   09/13/2018 3.8 3.6 - 5.1 g/dL Final   05/10/2018 3.7 3.1 - 4.7 g/dL      Total Bilirubin   Date Value Ref Range Status   09/13/2018 0.5 0.2 - 1.2 mg/dL Final     Alkaline Phosphatase   Date Value Ref Range Status   09/13/2018 39 33 - 115 U/L Final     AST   Date Value Ref Range Status   09/13/2018 13 10 - 30 U/L Final     ALT   Date Value Ref Range Status   09/13/2018 11 6 - 29 U/L Final     Anion Gap   Date Value Ref Range Status   12/11/2017 18 9 - 18 mEq/L Final         Xray      Impression/Plan    Problem List Items Addressed This Visit        Hematology    Pulmonary embolism - Primary    Antithrombin III deficiency, MTHFR mutation  - aware  - on Eliquis       Endocrine    Malnutrition/malabsorption  - seems better  - has gained about 5 # since 11/2017  - sees GI MD       Other    Clubbing of fingers  - CF studies negative  - RTC 3 months      Dyspnea on exertion  - better      Non-smoker      Other Visit Diagnoses    None.       '  Jayme Mccray MD

## 2019-02-25 ENCOUNTER — OFFICE VISIT (OUTPATIENT)
Dept: HEMATOLOGY/ONCOLOGY | Facility: CLINIC | Age: 39
End: 2019-02-25
Payer: MEDICAID

## 2019-02-25 VITALS
DIASTOLIC BLOOD PRESSURE: 71 MMHG | RESPIRATION RATE: 20 BRPM | TEMPERATURE: 98 F | BODY MASS INDEX: 18.55 KG/M2 | SYSTOLIC BLOOD PRESSURE: 103 MMHG | WEIGHT: 111.5 LBS | HEART RATE: 72 BPM

## 2019-02-25 DIAGNOSIS — E53.8 B12 DEFICIENCY: ICD-10-CM

## 2019-02-25 DIAGNOSIS — D68.59 ANTITHROMBIN III DEFICIENCY: ICD-10-CM

## 2019-02-25 DIAGNOSIS — Z15.89 MTHFR MUTATION: ICD-10-CM

## 2019-02-25 DIAGNOSIS — D50.8 OTHER IRON DEFICIENCY ANEMIA: ICD-10-CM

## 2019-02-25 DIAGNOSIS — I26.99 OTHER PULMONARY EMBOLISM WITHOUT ACUTE COR PULMONALE, UNSPECIFIED CHRONICITY: Primary | ICD-10-CM

## 2019-02-25 PROCEDURE — 99213 PR OFFICE/OUTPT VISIT, EST, LEVL III, 20-29 MIN: ICD-10-PCS | Mod: ,,, | Performed by: INTERNAL MEDICINE

## 2019-02-25 PROCEDURE — 99213 OFFICE O/P EST LOW 20 MIN: CPT | Mod: ,,, | Performed by: INTERNAL MEDICINE

## 2019-02-25 NOTE — PROGRESS NOTES
[facility] Hematology/Oncology  PROGRESS NOTE      Subjective:       Patient ID:   NAME: Janeth Siddiqi : 1980     39 y.o. female    Referring Doc: Lizy Diamond  Other Physicians: Francoise Noriega    Chief Complaint:  Anemia/b12 f/u    History of Present Illness:     Patient returns today for a regularly scheduled follow-up visit.  The patient is here with her mom and grandmother. She has been having menstrual bleeding and she saw GYN with Dr Oquendo and she has fibroids. No CP, SOB, HA's or N/V. She previously had self-discontinued the B12.       ROS:   GEN: normal without any fever, night sweats or weight loss  HEENT: normal with no HA's, sore throat, stiff neck, changes in vision  CV: normal with no CP, SOB, PND, KRAUSE or orthopnea  PULM: normal with no SOB, cough, hemoptysis, sputum or pleuritic pain  GI: normal with no abdominal pain, nausea, vomiting, constipation, diarrhea, melanotic stools, BRBPR, or hematemesis  : normal with no hematuria, dysuria  BREAST: normal with no mass, discharge, pain  SKIN: normal with no rash, erythema, bruising, or swelling    Allergies:  Review of patient's allergies indicates:   Allergen Reactions    Iron analogues Anaphylaxis     Infusion only. Tolerates po    Bactrim [sulfamethoxazole-trimethoprim] Swelling    Lidocaine Swelling    Nsaids (non-steroidal anti-inflammatory drug)      cannot take NSAIDs- upsets the stomach    Zofran [ondansetron hcl (pf)] Nausea Only       Medications:    Current Outpatient Medications:     CREON 24,000-76,000 -120,000 unit capsule, TK 1 C PO TID WC, Disp: , Rfl: 11    cyanocobalamin, vitamin B-12, (B-12 COMPLIANCE) 1,000 mcg/mL Kit, Inject 1,000 mcg as directed every 28 days., Disp: , Rfl:     ELIQUIS 5 mg Tab, TK 1 T PO  BID, Disp: , Rfl: 2    ergocalciferol (ERGOCALCIFEROL) 50,000 unit Cap, TAKE 1 CAPSULE BY MOUTH EVERY 7 DAYS, Disp: 4 capsule, Rfl: 6    ferrous sulfate 324 mg (65 mg iron) TbEC, Take 1 tablet (324 mg  total) by mouth 2 (two) times daily., Disp: 60 tablet, Rfl: 3    fluoxetine (PROZAC) 20 MG capsule, Take 20 mg by mouth once daily., Disp: , Rfl:     JUNEL FE 1/20, 28, 1 mg-20 mcg (21)/75 mg (7) per tablet, TK 1 T PO QD, Disp: , Rfl: 1    LINZESS 290 mcg Cap, TK 1 C PO D, Disp: , Rfl: 1    multivitamin capsule, Take 1 capsule by mouth once daily., Disp: , Rfl:     penicillin v potassium (VEETID) 500 MG tablet, TK 1 T PO  TID, Disp: , Rfl: 0    predniSONE (DELTASONE) 20 MG tablet, TK 3 TS PO ONCE DAILY FOR 5 DAYS, Disp: , Rfl: 0    promethazine (PHENERGAN) 25 MG suppository, Place 1 suppository (25 mg total) rectally every 6 (six) hours as needed for Nausea., Disp: 10 suppository, Rfl: 0    topiramate (TOPAMAX) 100 MG tablet, Take 1 tablet by mouth 3 (three) times daily., Disp: , Rfl: 1    trazodone (DESYREL) 150 MG tablet, TAKE 2 TS PO QD, Disp: , Rfl: 0    ZENPEP 40,000-126,000- 168,000 unit CpDR, TK 2 CS PO TID WITH MEALS, Disp: , Rfl: 6    PMHx/PSHx Updates:  See patient's last visit with me on 8/30/2018  See H&P on 12/7/16        Pathology:  none          Objective:     Vitals:  Blood pressure 103/71, pulse 72, temperature 97.8 °F (36.6 °C), temperature source Oral, resp. rate 20, weight 50.6 kg (111 lb 8 oz).    Physical Examination:   GEN: no apparent distress, comfortable; AAOx3  HEAD: atraumatic and normocephalic  EYES: no pallor, no icterus, PERRLA  ENT: OMM, no pharyngeal erythema, external ears WNL; no nasal discharge; no thrush  NECK: no masses, thyroid normal, trachea midline, no LAD/LN's, supple  CV: RRR with no murmur; normal pulse; normal S1 and S2; no pedal edema  CHEST: Normal respiratory effort; CTAB; normal breath sounds; no wheeze or crackles  ABDOM: nontender and nondistended; soft; normal bowel sounds; no rebound/guarding  MUSC/Skeletal: ROM normal; no crepitus; joints normal; no deformities or arthropathy  EXTREM: no clubbing, cyanosis, inflammation or swelling  SKIN: no rashes,  lesions, ulcers, petechiae or subcutaneous nodules  : no reza  NEURO: grossly intact; motor/sensory WNL; AAOx3; no tremors  PSYCH: normal mood, affect and behavior  LYMPH: normal cervical, supraclavicular, axillary and groin LN's            Labs:     2/18/2019  Lab Results   Component Value Date    IRON 90 02/18/2019    TIBC 267 02/18/2019    FERRITIN 79 02/18/2019 9/13/2018  Lab Results   Component Value Date    WBC 3.9 09/13/2018    HGB 13.5 09/13/2018    HCT 40.5 09/13/2018    MCV 90.6 09/13/2018     09/13/2018     BMP  Lab Results   Component Value Date     09/13/2018    K 4.4 09/13/2018     09/13/2018    CO2 23 09/13/2018    BUN 11 09/13/2018    CREATININE 0.86 09/13/2018    CALCIUM 8.8 09/13/2018    ANIONGAP 18 12/11/2017    ESTGFRAFRICA 100 09/13/2018    EGFRNONAA 86 09/13/2018     Vitamin B-12 200 - 1,100 pg/mL 1,447 Abnormally high     Folate ng/mL >24.0          Radiology/Diagnostic Studies:    X-ray Abdomen Flat And Erect    Result Date: 7/21/2017  EXAM: KUB. INDICATION: Obstruction. COMPARISON: KUB 1/1/14. FINDINGS: Supine and upright radiographs of the abdomen demonstrate a nonobstructive bowel gas pattern. There is no free air. There is no pneumatosis. There is no portal venous gas. There is calcification. Lung bases are clear. There is no osseous abnormality.    1.Nonobstructed bowel gas pattern. Electronically signed by: KELBY DENNIS MD Date:     07/21/17 Time:    13:49     Ct Abdomen Pelvis With Contrast    Result Date: 7/21/2017  Procedure: CT of the abdomen and pelvis with IV contrast. Technique: Axial images of the abdomen and pelvis were obtained with intravenous contrast administration. Coronal and sagittal reconstructions were provided. Total DLP was 691 mGy-cm.  Dose lowering technique, automated exposure control, was utilized for this exam. History: Left-sided abdominal pain and vomiting. Comparison: CT of the abdomen and pelvis 7/31/2015. Findings: The bilateral  lung bases are clear. The heart is normal in size. There is a 7 mm focus of arterial enhancement within segment 4B of the liver which is isodense to the adjacent liver on delayed phase. This most consistent with a flash filling hemangioma. The portal vein is patent. The spleen, pancreas, and adrenal glands are normal. Bilateral kidneys are normal. There is no hydronephrosis or nephrolithiasis. The stomach and small bowel are decompressed. The appendix is not visualized, however there is no right lower quadrant inflammatory stranding. The colon is normal. The uterus and adnexa are normal for age. Urinary bladder is normal. There is trace physiologic free fluid in the lower pelvis. There is no pelvic or retroperitoneal adenopathy. The abdominal aorta is non-aneurysmal. There is no lytic or blastic osseous lesions.     No acute abnormality of the abdomen and pelvis. Electronically signed by: ITNA DENNIS MD Date:     07/21/17 Time:    15:37       I have reviewed all available lab results and radiology reports.    Assessment/Plan:   (1) 39 y.o. female with diagnosis of iron deficiency anemia and B12 deficiency  - she was previously on oral iron   - latest ferritin is 79 and much better  - B12 adeqaute  - hgb is 13.5    (2) prior Pulmonary emboli with prior use of OCP's  - on oral anticoagulant drug Eliquis  - underlying MTHFR-A heterozygous and ATIII deficiency issues  - followed by Dr Mccray with Pulm and he ordered several tests and  workup for cystic fibrosis which was negative    (3) Crohn's disorder/pancreatitis - followed by Dr Noriega with GI in past and now Dr tina Sinclair    (4) Chronic GYN bleeding - followed by Dr Gigi Oquendo with GYN  - latest hgb wnl  - she is on OCP's still and thus she needs to probably remain on the blood thinners    (5) prior Vit D deficiency    (6) She is being seen by Dr Khoobehi with plastics in [place] and had a Voluma skin filler without any difficulties    1. Other pulmonary  embolism without acute cor pulmonale, unspecified chronicity     2. Antithrombin III deficiency     3. Other iron deficiency anemia     4. MTHFR mutation     5. B12 deficiency         PLAN:  1. Check labs every 3 months  2. F/u with PCP , GYN, and pulm  3. RTC  in 6 months  Fax note to Schuette, Stri[place]in, Khoobehi, Kate Brown, [place]    Discussion:     I have explained all of the above in detail and the patient understands all of the current recommendation(s). I have answered all of their questions to the best of my ability and to their complete satisfaction.   The patient is to continue with the current management plan.            Electronically signed by Miguel Hurst MD

## 2019-02-25 NOTE — LETTER
February 25, 2019      Lizy Diamond MD  [address]  [address]  [place] [address]           [facility] - Hematology Oncology  [address]  [address]  [place] [address]  Phone: [phone]  Fax: [phone]          Patient: Janeth Siddiqi   MR Number: [id number]   YOB: 1980   Date of Visit: 2/25/2019       Dear Dr. Lizy Diamond:    Thank you for referring Janeth Siddiqi to me for evaluation. Attached you will find relevant portions of my assessment and plan of care.    If you have questions, please do not hesitate to call me. I look forward to following Janeth Siddiqi along with you.    Sincerely,    Miguel Hurst MD    Enclosure  CC:  No Recipients    If you would like to receive this communication electronically, please contact [email or web address] or [phone] to request more information on [facility] Link access.    For providers and/or their staff who would like to refer a patient to [facility], please contact us through our one-stop-shop provider referral line, [facility] [place], at [phone].    If you feel you have received this communication in error or would no longer like to receive these types of communications, please e-mail [email or web address]

## 2019-04-16 ENCOUNTER — TELEPHONE (OUTPATIENT)
Dept: HEMATOLOGY/ONCOLOGY | Facility: CLINIC | Age: 39
End: 2019-04-16

## 2019-04-16 NOTE — TELEPHONE ENCOUNTER
Jazmín took this     ----- Message from Syeda Power sent at 4/16/2019  2:07 PM CDT -----  Pt needs the variants of the MTHFR sent to .    # [phone]

## 2019-04-25 ENCOUNTER — TELEPHONE (OUTPATIENT)
Dept: HEMATOLOGY/ONCOLOGY | Facility: CLINIC | Age: 39
End: 2019-04-25

## 2019-05-02 DIAGNOSIS — I26.99 OTHER PULMONARY EMBOLISM WITHOUT ACUTE COR PULMONALE, UNSPECIFIED CHRONICITY: Primary | ICD-10-CM

## 2019-05-03 RX ORDER — APIXABAN 5 MG/1
TABLET, FILM COATED ORAL
Qty: 60 TABLET | Refills: 0 | Status: SHIPPED | OUTPATIENT
Start: 2019-05-03 | End: 2019-06-21 | Stop reason: SDUPTHER

## 2019-06-21 DIAGNOSIS — I26.99 OTHER PULMONARY EMBOLISM WITHOUT ACUTE COR PULMONALE, UNSPECIFIED CHRONICITY: ICD-10-CM

## 2019-06-21 RX ORDER — APIXABAN 5 MG/1
TABLET, FILM COATED ORAL
Qty: 60 TABLET | Refills: 0 | Status: SHIPPED | OUTPATIENT
Start: 2019-06-21 | End: 2019-08-17 | Stop reason: SDUPTHER

## 2019-08-17 DIAGNOSIS — I26.99 OTHER PULMONARY EMBOLISM WITHOUT ACUTE COR PULMONALE, UNSPECIFIED CHRONICITY: ICD-10-CM

## 2019-08-19 RX ORDER — APIXABAN 5 MG/1
TABLET, FILM COATED ORAL
Qty: 60 TABLET | Refills: 0 | Status: SHIPPED | OUTPATIENT
Start: 2019-08-19 | End: 2019-09-15 | Stop reason: SDUPTHER

## 2019-08-23 ENCOUNTER — TELEPHONE (OUTPATIENT)
Dept: HEMATOLOGY/ONCOLOGY | Facility: CLINIC | Age: 39
End: 2019-08-23

## 2019-08-23 NOTE — TELEPHONE ENCOUNTER
Called to see if the pt had any labs done prior to f/u appt.    Let pt know to do the labs @ [facility].

## 2019-08-26 ENCOUNTER — LAB VISIT (OUTPATIENT)
Dept: LAB | Facility: HOSPITAL | Age: 39
End: 2019-08-26
Attending: INTERNAL MEDICINE
Payer: MEDICAID

## 2019-08-26 ENCOUNTER — OFFICE VISIT (OUTPATIENT)
Dept: HEMATOLOGY/ONCOLOGY | Facility: CLINIC | Age: 39
End: 2019-08-26
Payer: MEDICAID

## 2019-08-26 VITALS
DIASTOLIC BLOOD PRESSURE: 73 MMHG | SYSTOLIC BLOOD PRESSURE: 104 MMHG | WEIGHT: 110.31 LBS | HEART RATE: 75 BPM | BODY MASS INDEX: 18.35 KG/M2 | RESPIRATION RATE: 20 BRPM | TEMPERATURE: 98 F

## 2019-08-26 DIAGNOSIS — D50.8 OTHER IRON DEFICIENCY ANEMIA: ICD-10-CM

## 2019-08-26 DIAGNOSIS — Z15.89 MTHFR MUTATION: ICD-10-CM

## 2019-08-26 DIAGNOSIS — E53.8 B12 DEFICIENCY: ICD-10-CM

## 2019-08-26 DIAGNOSIS — I26.99 OTHER PULMONARY EMBOLISM WITHOUT ACUTE COR PULMONALE, UNSPECIFIED CHRONICITY: ICD-10-CM

## 2019-08-26 DIAGNOSIS — D68.59 ANTITHROMBIN III DEFICIENCY: Primary | ICD-10-CM

## 2019-08-26 LAB
ALBUMIN SERPL BCP-MCNC: 3.8 G/DL (ref 3.5–5.2)
ALP SERPL-CCNC: 35 U/L (ref 55–135)
ALT SERPL W/O P-5'-P-CCNC: 22 U/L (ref 10–44)
ANION GAP SERPL CALC-SCNC: 7 MMOL/L (ref 8–16)
AST SERPL-CCNC: 27 U/L (ref 10–40)
BASOPHILS # BLD AUTO: 0.05 K/UL (ref 0–0.2)
BASOPHILS NFR BLD: 1.1 % (ref 0–1.9)
BILIRUB SERPL-MCNC: 0.8 MG/DL (ref 0.1–1)
BUN SERPL-MCNC: 15 MG/DL (ref 6–20)
CALCIUM SERPL-MCNC: 9.5 MG/DL (ref 8.7–10.5)
CHLORIDE SERPL-SCNC: 111 MMOL/L (ref 95–110)
CO2 SERPL-SCNC: 25 MMOL/L (ref 23–29)
CREAT SERPL-MCNC: 1 MG/DL (ref 0.5–1.4)
DIFFERENTIAL METHOD: NORMAL
EOSINOPHIL # BLD AUTO: 0 K/UL (ref 0–0.5)
EOSINOPHIL NFR BLD: 0.4 % (ref 0–8)
ERYTHROCYTE [DISTWIDTH] IN BLOOD BY AUTOMATED COUNT: 13.5 % (ref 11.5–14.5)
EST. GFR  (AFRICAN AMERICAN): >60 ML/MIN/1.73 M^2
EST. GFR  (NON AFRICAN AMERICAN): >60 ML/MIN/1.73 M^2
FERRITIN SERPL-MCNC: 103 NG/ML (ref 20–300)
FOLATE SERPL-MCNC: >24.8 NG/ML (ref 4–24)
GLUCOSE SERPL-MCNC: 94 MG/DL (ref 70–110)
HCT VFR BLD AUTO: 43.8 % (ref 37–48.5)
HGB BLD-MCNC: 14.6 G/DL (ref 12–16)
IMM GRANULOCYTES # BLD AUTO: 0 K/UL (ref 0–0.04)
IMM GRANULOCYTES NFR BLD AUTO: 0 % (ref 0–0.5)
IRON SERPL-MCNC: 149 UG/DL (ref 30–160)
LYMPHOCYTES # BLD AUTO: 2.2 K/UL (ref 1–4.8)
LYMPHOCYTES NFR BLD: 47 % (ref 18–48)
MCH RBC QN AUTO: 30.2 PG (ref 27–31)
MCHC RBC AUTO-ENTMCNC: 33.3 G/DL (ref 32–36)
MCV RBC AUTO: 91 FL (ref 82–98)
MONOCYTES # BLD AUTO: 0.4 K/UL (ref 0.3–1)
MONOCYTES NFR BLD: 8.4 % (ref 4–15)
NEUTROPHILS # BLD AUTO: 2 K/UL (ref 1.8–7.7)
NEUTROPHILS NFR BLD: 43.1 % (ref 38–73)
NRBC BLD-RTO: 0 /100 WBC
PLATELET # BLD AUTO: 213 K/UL (ref 150–350)
PMV BLD AUTO: 9.4 FL (ref 9.2–12.9)
POTASSIUM SERPL-SCNC: 4.1 MMOL/L (ref 3.5–5.1)
PROT SERPL-MCNC: 7.3 G/DL (ref 6–8.4)
RBC # BLD AUTO: 4.83 M/UL (ref 4–5.4)
SATURATED IRON: 57 % (ref 20–50)
SODIUM SERPL-SCNC: 143 MMOL/L (ref 136–145)
TOTAL IRON BINDING CAPACITY: 263 UG/DL (ref 250–450)
TRANSFERRIN SERPL-MCNC: 188 MG/DL (ref 200–375)
WBC # BLD AUTO: 4.62 K/UL (ref 3.9–12.7)

## 2019-08-26 PROCEDURE — 85025 COMPLETE CBC W/AUTO DIFF WBC: CPT

## 2019-08-26 PROCEDURE — 82728 ASSAY OF FERRITIN: CPT

## 2019-08-26 PROCEDURE — 82746 ASSAY OF FOLIC ACID SERUM: CPT

## 2019-08-26 PROCEDURE — 99213 PR OFFICE/OUTPT VISIT, EST, LEVL III, 20-29 MIN: ICD-10-PCS | Mod: S$GLB,,, | Performed by: INTERNAL MEDICINE

## 2019-08-26 PROCEDURE — 99213 OFFICE O/P EST LOW 20 MIN: CPT | Mod: S$GLB,,, | Performed by: INTERNAL MEDICINE

## 2019-08-26 PROCEDURE — 83540 ASSAY OF IRON: CPT

## 2019-08-26 PROCEDURE — 80053 COMPREHEN METABOLIC PANEL: CPT

## 2019-08-26 RX ORDER — SPIRONOLACTONE 25 MG/1
25 TABLET ORAL DAILY
COMMUNITY
End: 2020-07-21 | Stop reason: CLARIF

## 2019-08-26 NOTE — PROGRESS NOTES
[facility] Hematology/Oncology  PROGRESS NOTE      Subjective:       Patient ID:   NAME: Janeth Siddiqi : 1980     39 y.o. female    Referring Doc: Lizy Diamond  Other Physicians: Francoise Noriega    Chief Complaint:  Anemia/b12 f/u    History of Present Illness:     Patient returns today for a regularly scheduled follow-up visit.  The patient is here with her mom. She has been having menstrual bleeding and she saw GYN with Dr Oquendo and she has fibroids. No CP, SOB, HA's or N/V. She previously had self-discontinued the B12.       ROS:   GEN: normal without any fever, night sweats or weight loss  HEENT: normal with no HA's, sore throat, stiff neck, changes in vision  CV: normal with no CP, SOB, PND, KRAUSE or orthopnea  PULM: normal with no SOB, cough, hemoptysis, sputum or pleuritic pain  GI: normal with no abdominal pain, nausea, vomiting, constipation, diarrhea, melanotic stools, BRBPR, or hematemesis  : normal with no hematuria, dysuria  BREAST: normal with no mass, discharge, pain  SKIN: normal with no rash, erythema, bruising, or swelling    Allergies:  Review of patient's allergies indicates:   Allergen Reactions    Iron analogues Anaphylaxis     Infusion only. Tolerates po    Bactrim [sulfamethoxazole-trimethoprim] Swelling    Lidocaine Swelling    Nsaids (non-steroidal anti-inflammatory drug)      cannot take NSAIDs- upsets the stomach    Zofran [ondansetron hcl (pf)] Nausea Only       Medications:    Current Outpatient Medications:     cyanocobalamin, vitamin B-12, (B-12 COMPLIANCE) 1,000 mcg/mL Kit, Inject 1,000 mcg as directed every 28 days., Disp: , Rfl:     ELIQUIS 5 mg Tab, TAKE 1 TABLET BY MOUTH TWICE DAILY, Disp: 60 tablet, Rfl: 0    ergocalciferol (ERGOCALCIFEROL) 50,000 unit Cap, TAKE 1 CAPSULE BY MOUTH EVERY 7 DAYS, Disp: 4 capsule, Rfl: 6    ferrous sulfate 324 mg (65 mg iron) TbEC, Take 1 tablet (324 mg total) by mouth 2 (two) times daily., Disp: 60 tablet, Rfl: 3     JUNEL FE 1/20, 28, 1 mg-20 mcg (21)/75 mg (7) per tablet, TK 1 T PO QD, Disp: , Rfl: 1    LINZESS 290 mcg Cap, TK 1 C PO D, Disp: , Rfl: 1    multivitamin capsule, Take 1 capsule by mouth once daily., Disp: , Rfl:     promethazine (PHENERGAN) 25 MG suppository, Place 1 suppository (25 mg total) rectally every 6 (six) hours as needed for Nausea., Disp: 10 suppository, Rfl: 0    spironolactone (ALDACTONE) 25 MG tablet, Take 25 mg by mouth once daily., Disp: , Rfl:     topiramate (TOPAMAX) 100 MG tablet, Take 1 tablet by mouth 3 (three) times daily., Disp: , Rfl: 1    trazodone (DESYREL) 150 MG tablet, TAKE 2 TS PO QD, Disp: , Rfl: 0    ZENPEP 40,000-126,000- 168,000 unit CpDR, TK 2 CS PO TID WITH MEALS, Disp: , Rfl: 6    PMHx/PSHx Updates:  See patient's last visit with me on 2/25/2019  See H&P on 12/7/16        Pathology:  none          Objective:     Vitals:  Blood pressure 104/73, pulse 75, temperature 98.3 °F (36.8 °C), temperature source Oral, resp. rate 20, weight 50 kg (110 lb 4.8 oz).    Physical Examination:   GEN: no apparent distress, comfortable; AAOx3  HEAD: atraumatic and normocephalic  EYES: no pallor, no icterus, PERRLA  ENT: OMM, no pharyngeal erythema, external ears WNL; no nasal discharge; no thrush  NECK: no masses, thyroid normal, trachea midline, no LAD/LN's, supple  CV: RRR with no murmur; normal pulse; normal S1 and S2; no pedal edema  CHEST: Normal respiratory effort; CTAB; normal breath sounds; no wheeze or crackles  ABDOM: nontender and nondistended; soft; normal bowel sounds; no rebound/guarding  MUSC/Skeletal: ROM normal; no crepitus; joints normal; no deformities or arthropathy  EXTREM: no clubbing, cyanosis, inflammation or swelling  SKIN: no rashes, lesions, ulcers, petechiae or subcutaneous nodules  : no reza  NEURO: grossly intact; motor/sensory WNL; AAOx3; no tremors  PSYCH: normal mood, affect and behavior  LYMPH: normal cervical, supraclavicular, axillary and groin  LN's            Labs:       8/23/2019 pending      2/18/2019  Lab Results   Component Value Date    IRON 90 02/18/2019    TIBC 267 02/18/2019    FERRITIN 79 02/18/2019 9/13/2018  Lab Results   Component Value Date    WBC 3.9 09/13/2018    HGB 13.5 09/13/2018    HCT 40.5 09/13/2018    MCV 90.6 09/13/2018     09/13/2018     BMP  Lab Results   Component Value Date     09/13/2018    K 4.4 09/13/2018     09/13/2018    CO2 23 09/13/2018    BUN 11 09/13/2018    CREATININE 0.86 09/13/2018    CALCIUM 8.8 09/13/2018    ANIONGAP 18 12/11/2017    ESTGFRAFRICA 100 09/13/2018    EGFRNONAA 86 09/13/2018     Vitamin B-12 200 - 1,100 pg/mL 1,447 Abnormally high     Folate ng/mL >24.0          Radiology/Diagnostic Studies:    X-ray Abdomen Flat And Erect    Result Date: 7/21/2017  EXAM: KUB. INDICATION: Obstruction. COMPARISON: KUB 1/1/14. FINDINGS: Supine and upright radiographs of the abdomen demonstrate a nonobstructive bowel gas pattern. There is no free air. There is no pneumatosis. There is no portal venous gas. There is calcification. Lung bases are clear. There is no osseous abnormality.    1.Nonobstructed bowel gas pattern. Electronically signed by: KELBY DENNIS MD Date:     07/21/17 Time:    13:49     Ct Abdomen Pelvis With Contrast    Result Date: 7/21/2017  Procedure: CT of the abdomen and pelvis with IV contrast. Technique: Axial images of the abdomen and pelvis were obtained with intravenous contrast administration. Coronal and sagittal reconstructions were provided. Total DLP was 691 mGy-cm.  Dose lowering technique, automated exposure control, was utilized for this exam. History: Left-sided abdominal pain and vomiting. Comparison: CT of the abdomen and pelvis 7/31/2015. Findings: The bilateral lung bases are clear. The heart is normal in size. There is a 7 mm focus of arterial enhancement within segment 4B of the liver which is isodense to the adjacent liver on delayed phase. This most  consistent with a flash filling hemangioma. The portal vein is patent. The spleen, pancreas, and adrenal glands are normal. Bilateral kidneys are normal. There is no hydronephrosis or nephrolithiasis. The stomach and small bowel are decompressed. The appendix is not visualized, however there is no right lower quadrant inflammatory stranding. The colon is normal. The uterus and adnexa are normal for age. Urinary bladder is normal. There is trace physiologic free fluid in the lower pelvis. There is no pelvic or retroperitoneal adenopathy. The abdominal aorta is non-aneurysmal. There is no lytic or blastic osseous lesions.     No acute abnormality of the abdomen and pelvis. Electronically signed by: TINA DENNIS MD Date:     07/21/17 Time:    15:37       I have reviewed all available lab results and radiology reports.    Assessment/Plan:   (1) 39 y.o. female with diagnosis of iron deficiency anemia and B12 deficiency  - she was previously on oral iron   - latest ferritin is 79 and much better  - B12 adeqaute  - hgb is 13.5    (2) prior Pulmonary emboli with prior use of OCP's  - on oral anticoagulant drug Eliquis  - underlying MTHFR-A heterozygous and ATIII deficiency issues  - followed by Dr Mccray with Pulm and he ordered several tests and  workup for cystic fibrosis which was negative    (3) Crohn's disorder/pancreatitis - followed by Dr Noriega with GI in past and now Dr tina Sinclair    (4) Chronic GYN bleeding - followed by Dr Gigi Oquendo with GYN  - latest hgb wnl  - she is on OCP's still and thus she needs to probably remain on the blood thinners    (5) prior Vit D deficiency    (6) She is being seen by Dr Khoobehi with plastics in [place] and had a Voluma skin filler without any difficulties    1. Antithrombin III deficiency     2. Other pulmonary embolism without acute cor pulmonale, unspecified chronicity     3. Other iron deficiency anemia     4. B12 deficiency     5. MTHFR mutation         PLAN:  1.  Check labs every 3 months  2. F/u with PCP , GYN, and pulm  3. RTC  in 6 months  Fax note to Schuette, Striplin, Khoobehi, Kate Brown, Hutchings    Discussion:     I have explained all of the above in detail and the patient understands all of the current recommendation(s). I have answered all of their questions to the best of my ability and to their complete satisfaction.   The patient is to continue with the current management plan.            Electronically signed by Miguel Hurst MD

## 2019-09-09 LAB — FOLATE SERPL-MCNC: >24 NG/ML

## 2019-09-15 DIAGNOSIS — I26.99 OTHER PULMONARY EMBOLISM WITHOUT ACUTE COR PULMONALE, UNSPECIFIED CHRONICITY: ICD-10-CM

## 2019-09-15 RX ORDER — APIXABAN 5 MG/1
TABLET, FILM COATED ORAL
Qty: 60 TABLET | Refills: 0 | Status: SHIPPED | OUTPATIENT
Start: 2019-09-15 | End: 2019-10-23 | Stop reason: SDUPTHER

## 2019-09-18 RX ORDER — ERGOCALCIFEROL 1.25 MG/1
CAPSULE ORAL
Qty: 4 CAPSULE | Refills: 6 | Status: SHIPPED | OUTPATIENT
Start: 2019-09-18 | End: 2020-04-01

## 2019-09-19 ENCOUNTER — TELEPHONE (OUTPATIENT)
Dept: HEMATOLOGY/ONCOLOGY | Facility: CLINIC | Age: 39
End: 2019-09-19

## 2019-09-19 NOTE — TELEPHONE ENCOUNTER
----- Message from Brooklyn Garay sent at 9/19/2019  2:36 PM CDT -----  The patient would like to get her lab results. Please call her back at [phone].

## 2019-09-20 DIAGNOSIS — H57.13 PAIN OF BOTH EYES: Primary | ICD-10-CM

## 2019-09-20 DIAGNOSIS — G35 MULTIPLE SCLEROSIS: ICD-10-CM

## 2019-09-30 ENCOUNTER — HOSPITAL ENCOUNTER (OUTPATIENT)
Dept: RADIOLOGY | Facility: HOSPITAL | Age: 39
Discharge: HOME | End: 2019-09-30
Attending: OPTOMETRIST
Payer: MEDICAID

## 2019-09-30 DIAGNOSIS — G35 MULTIPLE SCLEROSIS: ICD-10-CM

## 2019-09-30 DIAGNOSIS — H57.13 PAIN OF BOTH EYES: ICD-10-CM

## 2019-09-30 PROCEDURE — 70543 MRI ORBT/FAC/NCK W/O &W/DYE: CPT | Mod: TC,PO

## 2019-09-30 PROCEDURE — A9585 GADOBUTROL INJECTION: HCPCS | Mod: PO

## 2019-09-30 PROCEDURE — 25500020 PHARM REV CODE 255: Mod: PO

## 2019-09-30 RX ORDER — GADOBUTROL 604.72 MG/ML
5 INJECTION INTRAVENOUS
Status: COMPLETED | OUTPATIENT
Start: 2019-09-30 | End: 2019-09-30

## 2019-09-30 RX ADMIN — GADOBUTROL 5 ML: 604.72 INJECTION INTRAVENOUS at 08:09

## 2019-10-23 DIAGNOSIS — I26.99 OTHER PULMONARY EMBOLISM WITHOUT ACUTE COR PULMONALE, UNSPECIFIED CHRONICITY: ICD-10-CM

## 2019-10-23 RX ORDER — APIXABAN 5 MG/1
TABLET, FILM COATED ORAL
Qty: 60 TABLET | Refills: 0 | Status: SHIPPED | OUTPATIENT
Start: 2019-10-23 | End: 2019-12-07 | Stop reason: SDUPTHER

## 2019-11-26 DIAGNOSIS — K90.9 INTESTINAL MALABSORPTION: Primary | ICD-10-CM

## 2019-12-07 DIAGNOSIS — I26.99 OTHER PULMONARY EMBOLISM WITHOUT ACUTE COR PULMONALE, UNSPECIFIED CHRONICITY: ICD-10-CM

## 2019-12-08 RX ORDER — APIXABAN 5 MG/1
TABLET, FILM COATED ORAL
Qty: 60 TABLET | Refills: 0 | Status: SHIPPED | OUTPATIENT
Start: 2019-12-08 | End: 2020-01-08

## 2019-12-10 ENCOUNTER — NUTRITION (OUTPATIENT)
Dept: NUTRITION | Facility: HOSPITAL | Age: 39
End: 2019-12-10
Payer: MEDICAID

## 2019-12-10 DIAGNOSIS — K90.9 IMPAIRED INTESTINAL ABSORPTION: Primary | ICD-10-CM

## 2019-12-10 PROCEDURE — 97802 MEDICAL NUTRITION INDIV IN: CPT

## 2019-12-10 NOTE — PROGRESS NOTES
Diagnosis/Reason for Referral: Intestinal Malabsorption  Medical Nutrition Prescription: Medical Nutrition Therapy    Nutritional Supplements: Vitamin D, MVI, trace minerals, collagen, raw calcium, hair/nail supplement w/ biotin, Vitamin C, Vitamin A    Reviewed:    ? Gastroparesis Nutrition therapy  ? MTHFR gene mutation and nutrition therapy  ? Low fiber diet  ? Smoothies recipe  ? Dumping syndrome and diet      Comments:    This was patient's first visit with dietitian. I reviewed all the above with her. Patient reports weight being stable and happy with her current weight. Patient is frustrated with her situation of having to take so many vitamin/minerals and other supplements for her to feel normal. She complains of chronic pancreatitis but unsure why she has it and noted that she was positive for Chron's disease and ulcerative colitis. Patient constantly has GI symptoms such as abd pain, diarrhea or constipation. She does take a laxative to prevent constipation. Patient used to keep a food journal but has stopped.     I reviewed her 24hr recall. Patient stated that she normally starts eating at 11am and works on the same meal for a couple of hours. These meals normally consist of protein (chicken, fish or egg) and cooked vegetables. She cannot have starches with protein/veggies so she eats them at a different time. She normally eats pasta and sweet potatoes with her late meal at 2pm and 4 pm). Patient stops eating at 4pm otherwise her food will not digest. Patient does not like milk or milkshakes.    Patient noted that she has to have fluids with meals otherwise food will get stuck and she does drink sodas and eat fried foods seldomly.    Goals: 1. Chew well, walk after meals, continue to eat small meals during the day and drink more liquids in between meals instead of with meals.    2. Obtain MCT oil for better absorption of fat soluble vitamins.   3. Add smoothie to increase nutrient content since patient  cannot eat more. Start with 1/4 cup and increase as tolerated.     She was given my email and telephone number for any questions that may arise.    Thank you for the referral.    Allie Wade  12/10/2019

## 2020-01-08 DIAGNOSIS — I26.99 OTHER PULMONARY EMBOLISM WITHOUT ACUTE COR PULMONALE, UNSPECIFIED CHRONICITY: ICD-10-CM

## 2020-01-08 RX ORDER — APIXABAN 5 MG/1
TABLET, FILM COATED ORAL
Qty: 60 TABLET | Refills: 0 | Status: SHIPPED | OUTPATIENT
Start: 2020-01-08 | End: 2020-02-12

## 2020-02-12 DIAGNOSIS — I26.99 OTHER PULMONARY EMBOLISM WITHOUT ACUTE COR PULMONALE, UNSPECIFIED CHRONICITY: ICD-10-CM

## 2020-02-12 RX ORDER — APIXABAN 5 MG/1
TABLET, FILM COATED ORAL
Qty: 60 TABLET | Refills: 0 | Status: SHIPPED | OUTPATIENT
Start: 2020-02-12 | End: 2020-02-19 | Stop reason: SDUPTHER

## 2020-02-18 NOTE — PROGRESS NOTES
[facility] Hematology/Oncology  PROGRESS NOTE      Subjective:       Patient ID:   NAME: Janeth Siddiqi : 1980     40 y.o. female    Referring Doc: Lizy Diamond  Other Physicians: Francoise Noriega    Chief Complaint:  Anemia/b12 f/u    History of Present Illness:     Patient returns today for a regularly scheduled follow-up visit.  The patient is here with her mom and grand-mother. She has not had any further heavy menstrual bleeding  But she did have a ruptured ovarian cyst since last visit.  No CP, SOB, HA's or N/V. She previously had self-discontinued the B12.       ROS:   GEN: normal without any fever, night sweats or weight loss  HEENT: normal with no HA's, sore throat, stiff neck, changes in vision  CV: normal with no CP, SOB, PND, KRAUSE or orthopnea  PULM: normal with no SOB, cough, hemoptysis, sputum or pleuritic pain  GI: normal with no abdominal pain, nausea, vomiting, constipation, diarrhea, melanotic stools, BRBPR, or hematemesis  : normal with no hematuria, dysuria  BREAST: normal with no mass, discharge, pain  SKIN: normal with no rash, erythema, bruising, or swelling    Allergies:  Review of patient's allergies indicates:   Allergen Reactions    Iron analogues Anaphylaxis     Infusion only. Tolerates po    Bactrim [sulfamethoxazole-trimethoprim] Swelling    Lidocaine Swelling    Nsaids (non-steroidal anti-inflammatory drug)      cannot take NSAIDs- upsets the stomach    Zofran [ondansetron hcl (pf)] Nausea Only       Medications:    Current Outpatient Medications:     cyanocobalamin, vitamin B-12, (B-12 COMPLIANCE) 1,000 mcg/mL Kit, Inject 1,000 mcg as directed every 28 days., Disp: , Rfl:     ELIQUIS 5 mg Tab, TAKE 1 TABLET BY MOUTH TWICE DAILY, Disp: 60 tablet, Rfl: 0    ergocalciferol (ERGOCALCIFEROL) 50,000 unit Cap, TAKE 1 CAPSULE BY MOUTH EVERY 7 DAYS, Disp: 4 capsule, Rfl: 6    ferrous sulfate 324 mg (65 mg iron) TbEC, Take 1 tablet (324 mg total) by mouth 2 (two) times  daily., Disp: 60 tablet, Rfl: 3    JUNEL FE 1/20, 28, 1 mg-20 mcg (21)/75 mg (7) per tablet, TK 1 T PO QD, Disp: , Rfl: 1    LINZESS 290 mcg Cap, TK 1 C PO D, Disp: , Rfl: 1    multivitamin capsule, Take 1 capsule by mouth once daily., Disp: , Rfl:     promethazine (PHENERGAN) 25 MG suppository, Place 1 suppository (25 mg total) rectally every 6 (six) hours as needed for Nausea., Disp: 10 suppository, Rfl: 0    spironolactone (ALDACTONE) 25 MG tablet, Take 25 mg by mouth once daily., Disp: , Rfl:     topiramate (TOPAMAX) 100 MG tablet, Take 1 tablet by mouth 3 (three) times daily., Disp: , Rfl: 1    trazodone (DESYREL) 150 MG tablet, TAKE 2 TS PO QD, Disp: , Rfl: 0    ZENPEP 40,000-126,000- 168,000 unit CpDR, TK 2 CS PO TID WITH MEALS, Disp: , Rfl: 6    PMHx/PSHx Updates:  See patient's last visit with me on 8/26/2019  See H&P on 12/7/16        Pathology:  none          Objective:     Vitals:  Blood pressure 109/79, pulse 65, temperature 98.7 °F (37.1 °C), temperature source Oral, resp. rate 19, weight 49.4 kg (108 lb 14.4 oz).    Physical Examination:   GEN: no apparent distress, comfortable; AAOx3  HEAD: atraumatic and normocephalic  EYES: no pallor, no icterus, PERRLA  ENT: OMM, no pharyngeal erythema, external ears WNL; no nasal discharge; no thrush  NECK: no masses, thyroid normal, trachea midline, no LAD/LN's, supple  CV: RRR with no murmur; normal pulse; normal S1 and S2; no pedal edema  CHEST: Normal respiratory effort; CTAB; normal breath sounds; no wheeze or crackles  ABDOM: nontender and nondistended; soft; normal bowel sounds; no rebound/guarding  MUSC/Skeletal: ROM normal; no crepitus; joints normal; no deformities or arthropathy  EXTREM: no clubbing, cyanosis, inflammation or swelling  SKIN: no rashes, lesions, ulcers, petechiae or subcutaneous nodules  : no reza  NEURO: grossly intact; motor/sensory WNL; AAOx3; no tremors  PSYCH: normal mood, affect and behavior  LYMPH: normal cervical,  supraclavicular, axillary and groin LN's            Labs:       8/26/2019  Lab Results   Component Value Date    IRON 149 08/26/2019    TIBC 263 08/26/2019    FERRITIN 103 08/26/2019            Lab Results   Component Value Date    WBC 4.62 08/26/2019    HGB 14.6 08/26/2019    HCT 43.8 08/26/2019    MCV 91 08/26/2019     08/26/2019     BMP  Lab Results   Component Value Date     08/26/2019    K 4.1 08/26/2019     (H) 08/26/2019    CO2 25 08/26/2019    BUN 15 08/26/2019    CREATININE 1.0 08/26/2019    CALCIUM 9.5 08/26/2019    ANIONGAP 7 (L) 08/26/2019    ESTGFRAFRICA >60.0 08/26/2019    EGFRNONAA >60.0 08/26/2019      Lab Results   Component Value Date    IRON 149 08/26/2019    TIBC 263 08/26/2019    FERRITIN 103 08/26/2019         Radiology/Diagnostic Studies:    X-ray Abdomen Flat And Erect    Result Date: 7/21/2017  EXAM: KUB. INDICATION: Obstruction. COMPARISON: KUB 1/1/14. FINDINGS: Supine and upright radiographs of the abdomen demonstrate a nonobstructive bowel gas pattern. There is no free air. There is no pneumatosis. There is no portal venous gas. There is calcification. Lung bases are clear. There is no osseous abnormality.    1.Nonobstructed bowel gas pattern. Electronically signed by: KELBY DENNIS MD Date:     07/21/17 Time:    13:49     Ct Abdomen Pelvis With Contrast    Result Date: 7/21/2017  Procedure: CT of the abdomen and pelvis with IV contrast. Technique: Axial images of the abdomen and pelvis were obtained with intravenous contrast administration. Coronal and sagittal reconstructions were provided. Total DLP was 691 mGy-cm.  Dose lowering technique, automated exposure control, was utilized for this exam. History: Left-sided abdominal pain and vomiting. Comparison: CT of the abdomen and pelvis 7/31/2015. Findings: The bilateral lung bases are clear. The heart is normal in size. There is a 7 mm focus of arterial enhancement within segment 4B of the liver which is isodense to the  adjacent liver on delayed phase. This most consistent with a flash filling hemangioma. The portal vein is patent. The spleen, pancreas, and adrenal glands are normal. Bilateral kidneys are normal. There is no hydronephrosis or nephrolithiasis. The stomach and small bowel are decompressed. The appendix is not visualized, however there is no right lower quadrant inflammatory stranding. The colon is normal. The uterus and adnexa are normal for age. Urinary bladder is normal. There is trace physiologic free fluid in the lower pelvis. There is no pelvic or retroperitoneal adenopathy. The abdominal aorta is non-aneurysmal. There is no lytic or blastic osseous lesions.     No acute abnormality of the abdomen and pelvis. Electronically signed by: TINA DENNIS MD Date:     07/21/17 Time:    15:37       I have reviewed all available lab results and radiology reports.    Assessment/Plan:   (1) 40 y.o. female with diagnosis of iron deficiency anemia and B12 deficiency  - she was previously on oral iron   - latest ferritin is 79 and much better  - B12 adeqaute  - hgb is 13.5    (2) prior Pulmonary emboli with prior use of OCP's  - on oral anticoagulant drug Eliquis  - underlying MTHFR-A heterozygous and ATIII deficiency issues  - followed by Dr Mccray with Pulm and he ordered several tests and  workup for cystic fibrosis which was negative    (3) Crohn's disorder/pancreatitis - followed by Dr Noriega with GI in past and now Dr tina Sinclair    (4) Chronic GYN bleeding (resolved) - followed by Dr Gigi Oquendo with GYN  - latest hgb wnl  - she is on OCP's still and thus she needs to probably remain on the blood thinners    (5) prior Vit D deficiency    (6) She is being seen by Dr Khoobehi with plastics in [place] and had a Voluma skin filler without any difficulties    1. MTHFR mutation     2. B12 deficiency     3. Other iron deficiency anemia     4. Other pulmonary embolism without acute cor pulmonale, unspecified chronicity      5. Antithrombin III deficiency         PLAN:  1. Check labs every 3 months; refill eliquis  2. F/u with PCP , GYN, and pulm  3. RTC  in 6 months  Fax note to Schuette, Striplin, Khoobehi, Kate Brown, Hutchings    Discussion:     I have explained all of the above in detail and the patient understands all of the current recommendation(s). I have answered all of their questions to the best of my ability and to their complete satisfaction.   The patient is to continue with the current management plan.            Electronically signed by Miguel Hurst MD

## 2020-02-19 ENCOUNTER — OFFICE VISIT (OUTPATIENT)
Dept: HEMATOLOGY/ONCOLOGY | Facility: CLINIC | Age: 40
End: 2020-02-19
Payer: MEDICAID

## 2020-02-19 VITALS
BODY MASS INDEX: 18.12 KG/M2 | RESPIRATION RATE: 19 BRPM | SYSTOLIC BLOOD PRESSURE: 109 MMHG | DIASTOLIC BLOOD PRESSURE: 79 MMHG | HEART RATE: 65 BPM | WEIGHT: 108.88 LBS | TEMPERATURE: 99 F

## 2020-02-19 DIAGNOSIS — D50.8 OTHER IRON DEFICIENCY ANEMIA: ICD-10-CM

## 2020-02-19 DIAGNOSIS — I26.99 OTHER PULMONARY EMBOLISM WITHOUT ACUTE COR PULMONALE, UNSPECIFIED CHRONICITY: ICD-10-CM

## 2020-02-19 DIAGNOSIS — D68.59 ANTITHROMBIN III DEFICIENCY: ICD-10-CM

## 2020-02-19 DIAGNOSIS — Z15.89 MTHFR MUTATION: Primary | ICD-10-CM

## 2020-02-19 DIAGNOSIS — E53.8 B12 DEFICIENCY: ICD-10-CM

## 2020-02-19 PROCEDURE — 99213 OFFICE O/P EST LOW 20 MIN: CPT | Mod: S$GLB,,, | Performed by: INTERNAL MEDICINE

## 2020-02-19 PROCEDURE — 99213 PR OFFICE/OUTPT VISIT, EST, LEVL III, 20-29 MIN: ICD-10-PCS | Mod: S$GLB,,, | Performed by: INTERNAL MEDICINE

## 2020-02-20 LAB
ALBUMIN SERPL-MCNC: 4 G/DL (ref 3.6–5.1)
ALBUMIN/GLOB SERPL: 1.5 (CALC) (ref 1–2.5)
ALP SERPL-CCNC: 34 U/L (ref 31–125)
ALT SERPL-CCNC: 28 U/L (ref 6–29)
AST SERPL-CCNC: 20 U/L (ref 10–30)
BASOPHILS # BLD AUTO: 29 CELLS/UL (ref 0–200)
BASOPHILS NFR BLD AUTO: 0.7 %
BILIRUB SERPL-MCNC: 0.6 MG/DL (ref 0.2–1.2)
BUN SERPL-MCNC: 11 MG/DL (ref 7–25)
BUN/CREAT SERPL: NORMAL (CALC) (ref 6–22)
CALCIUM SERPL-MCNC: 9.1 MG/DL (ref 8.6–10.2)
CHLORIDE SERPL-SCNC: 109 MMOL/L (ref 98–110)
CO2 SERPL-SCNC: 24 MMOL/L (ref 20–32)
CREAT SERPL-MCNC: 0.95 MG/DL (ref 0.5–1.1)
EOSINOPHIL # BLD AUTO: 29 CELLS/UL (ref 15–500)
EOSINOPHIL NFR BLD AUTO: 0.7 %
ERYTHROCYTE [DISTWIDTH] IN BLOOD BY AUTOMATED COUNT: 11.7 % (ref 11–15)
FERRITIN SERPL-MCNC: 141 NG/ML (ref 16–154)
GFRSERPLBLD MDRD-ARVRAT: 75 ML/MIN/1.73M2
GLOBULIN SER CALC-MCNC: 2.6 G/DL (CALC) (ref 1.9–3.7)
GLUCOSE SERPL-MCNC: 87 MG/DL (ref 65–99)
HCT VFR BLD AUTO: 39.8 % (ref 35–45)
HGB BLD-MCNC: 12.9 G/DL (ref 11.7–15.5)
IRON SATN MFR SERPL: 81 % (CALC) (ref 16–45)
IRON SERPL-MCNC: 186 MCG/DL (ref 40–190)
LYMPHOCYTES # BLD AUTO: 1709 CELLS/UL (ref 850–3900)
LYMPHOCYTES NFR BLD AUTO: 40.7 %
MCH RBC QN AUTO: 29.8 PG (ref 27–33)
MCHC RBC AUTO-ENTMCNC: 32.4 G/DL (ref 32–36)
MCV RBC AUTO: 91.9 FL (ref 80–100)
MONOCYTES # BLD AUTO: 441 CELLS/UL (ref 200–950)
MONOCYTES NFR BLD AUTO: 10.5 %
NEUTROPHILS # BLD AUTO: 1991 CELLS/UL (ref 1500–7800)
NEUTROPHILS NFR BLD AUTO: 47.4 %
PLATELET # BLD AUTO: 225 THOUSAND/UL (ref 140–400)
PMV BLD REES-ECKER: 10.1 FL (ref 7.5–12.5)
POTASSIUM SERPL-SCNC: 4 MMOL/L (ref 3.5–5.3)
PROT SERPL-MCNC: 6.6 G/DL (ref 6.1–8.1)
RBC # BLD AUTO: 4.33 MILLION/UL (ref 3.8–5.1)
SODIUM SERPL-SCNC: 140 MMOL/L (ref 135–146)
TIBC SERPL-MCNC: 229 MCG/DL (CALC) (ref 250–450)
WBC # BLD AUTO: 4.2 THOUSAND/UL (ref 3.8–10.8)

## 2020-03-23 DIAGNOSIS — I26.99 OTHER PULMONARY EMBOLISM WITHOUT ACUTE COR PULMONALE, UNSPECIFIED CHRONICITY: ICD-10-CM

## 2020-03-23 RX ORDER — APIXABAN 5 MG/1
TABLET, FILM COATED ORAL
Qty: 60 TABLET | Refills: 6 | Status: SHIPPED | OUTPATIENT
Start: 2020-03-23 | End: 2020-10-23

## 2020-04-01 RX ORDER — ERGOCALCIFEROL 1.25 MG/1
CAPSULE ORAL
Qty: 4 CAPSULE | Refills: 6 | Status: SHIPPED | OUTPATIENT
Start: 2020-04-01 | End: 2020-11-02 | Stop reason: SDUPTHER

## 2020-06-16 ENCOUNTER — TELEPHONE (OUTPATIENT)
Dept: HEMATOLOGY/ONCOLOGY | Facility: CLINIC | Age: 40
End: 2020-06-16

## 2020-06-16 NOTE — TELEPHONE ENCOUNTER
----- Message from Elizabet Schmitt, Patient Care Assistant sent at 6/16/2020  3:16 PM CDT -----  Patient called in stating she need to see Dr. Hurst Before her scheduled appointment on 8/27/2020 and would like  to know if she could get an appointment sooner due to lab results . She can be reached at [phone]

## 2020-06-16 NOTE — TELEPHONE ENCOUNTER
Spoke to patient and she states she was recently at [place] (ED) and was told her lab results were abnormal to contact her pcp, but she is unable to get in with pcp and called us to see if Dr. Hurst can see her. I asked her to please get us a copy of the lab results because he needs to review them to make sure it is something that he can treat her for. She will try to upload the document to [facility] for review. AE

## 2020-07-21 ENCOUNTER — HOSPITAL ENCOUNTER (EMERGENCY)
Facility: HOSPITAL | Age: 40
Discharge: HOME | End: 2020-07-21
Attending: EMERGENCY MEDICINE
Payer: MEDICAID

## 2020-07-21 VITALS
DIASTOLIC BLOOD PRESSURE: 76 MMHG | HEART RATE: 55 BPM | OXYGEN SATURATION: 100 % | WEIGHT: 110 LBS | BODY MASS INDEX: 18.78 KG/M2 | TEMPERATURE: 99 F | SYSTOLIC BLOOD PRESSURE: 109 MMHG | HEIGHT: 64 IN | RESPIRATION RATE: 18 BRPM

## 2020-07-21 DIAGNOSIS — R10.9 ABDOMINAL PAIN: ICD-10-CM

## 2020-07-21 DIAGNOSIS — N73.9 PELVIC INFLAMMATORY DISEASE: Primary | ICD-10-CM

## 2020-07-21 LAB
ALBUMIN SERPL BCP-MCNC: 3.8 G/DL (ref 3.5–5.2)
ALP SERPL-CCNC: 35 U/L (ref 55–135)
ALT SERPL W/O P-5'-P-CCNC: 29 U/L (ref 10–44)
AMPHET+METHAMPHET UR QL: NEGATIVE
ANION GAP SERPL CALC-SCNC: 10 MMOL/L (ref 8–16)
APTT PPP: 33.7 SEC (ref 23.6–33.3)
AST SERPL-CCNC: 24 U/L (ref 10–40)
B-HCG UR QL: NEGATIVE
BACTERIA GENITAL QL WET PREP: ABNORMAL
BARBITURATES UR QL SCN>200 NG/ML: NEGATIVE
BASOPHILS # BLD AUTO: 0.03 K/UL (ref 0–0.2)
BASOPHILS NFR BLD: 0.6 % (ref 0–1.9)
BENZODIAZ UR QL SCN>200 NG/ML: NEGATIVE
BILIRUB SERPL-MCNC: 0.7 MG/DL (ref 0.1–1)
BILIRUB UR QL STRIP: NEGATIVE
BNP SERPL-MCNC: 22 PG/ML (ref 0–99)
BUN SERPL-MCNC: 11 MG/DL (ref 6–20)
BZE UR QL SCN: NEGATIVE
CALCIUM SERPL-MCNC: 8.8 MG/DL (ref 8.7–10.5)
CANNABINOIDS UR QL SCN: NEGATIVE
CHLORIDE SERPL-SCNC: 104 MMOL/L (ref 95–110)
CLARITY UR: ABNORMAL
CLUE CELLS VAG QL WET PREP: ABNORMAL
CO2 SERPL-SCNC: 24 MMOL/L (ref 23–29)
COLOR UR: YELLOW
CREAT SERPL-MCNC: 0.9 MG/DL (ref 0.5–1.4)
CREAT UR-MCNC: 237 MG/DL (ref 15–325)
CTP QC/QA: YES
DIFFERENTIAL METHOD: NORMAL
EOSINOPHIL # BLD AUTO: 0 K/UL (ref 0–0.5)
EOSINOPHIL NFR BLD: 0.6 % (ref 0–8)
ERYTHROCYTE [DISTWIDTH] IN BLOOD BY AUTOMATED COUNT: 14.1 % (ref 11.5–14.5)
EST. GFR  (AFRICAN AMERICAN): >60 ML/MIN/1.73 M^2
EST. GFR  (NON AFRICAN AMERICAN): >60 ML/MIN/1.73 M^2
FILAMENT FUNGI VAG WET PREP-#/AREA: ABNORMAL
GLUCOSE SERPL-MCNC: 95 MG/DL (ref 70–110)
GLUCOSE UR QL STRIP: NEGATIVE
HCT VFR BLD AUTO: 40.9 % (ref 37–48.5)
HGB BLD-MCNC: 13.5 G/DL (ref 12–16)
HGB UR QL STRIP: NEGATIVE
IMM GRANULOCYTES # BLD AUTO: 0.01 K/UL (ref 0–0.04)
IMM GRANULOCYTES NFR BLD AUTO: 0.2 % (ref 0–0.5)
INR PPP: 1.3
KETONES UR QL STRIP: NEGATIVE
LEUKOCYTE ESTERASE UR QL STRIP: NEGATIVE
LIPASE SERPL-CCNC: 59 U/L (ref 4–60)
LYMPHOCYTES # BLD AUTO: 1.9 K/UL (ref 1–4.8)
LYMPHOCYTES NFR BLD: 40.4 % (ref 18–48)
MAGNESIUM SERPL-MCNC: 1.9 MG/DL (ref 1.6–2.6)
MCH RBC QN AUTO: 29.4 PG (ref 27–31)
MCHC RBC AUTO-ENTMCNC: 33 G/DL (ref 32–36)
MCV RBC AUTO: 89 FL (ref 82–98)
MONOCYTES # BLD AUTO: 0.5 K/UL (ref 0.3–1)
MONOCYTES NFR BLD: 10.4 % (ref 4–15)
NEUTROPHILS # BLD AUTO: 2.3 K/UL (ref 1.8–7.7)
NEUTROPHILS NFR BLD: 47.8 % (ref 38–73)
NITRITE UR QL STRIP: NEGATIVE
NRBC BLD-RTO: 0 /100 WBC
OPIATES UR QL SCN: NEGATIVE
PCP UR QL SCN>25 NG/ML: NEGATIVE
PH UR STRIP: >8 [PH] (ref 5–8)
PLATELET # BLD AUTO: 195 K/UL (ref 150–350)
PMV BLD AUTO: 9.7 FL (ref 9.2–12.9)
POTASSIUM SERPL-SCNC: 3.8 MMOL/L (ref 3.5–5.1)
PROT SERPL-MCNC: 6.8 G/DL (ref 6–8.4)
PROT UR QL STRIP: ABNORMAL
PROTHROMBIN TIME: 15.9 SEC (ref 10.6–14.8)
RBC # BLD AUTO: 4.59 M/UL (ref 4–5.4)
SARS-COV-2 RDRP RESP QL NAA+PROBE: NEGATIVE
SODIUM SERPL-SCNC: 138 MMOL/L (ref 136–145)
SP GR UR STRIP: 1.02 (ref 1–1.03)
SPECIMEN SOURCE: ABNORMAL
T VAGINALIS GENITAL QL WET PREP: ABNORMAL
TOXICOLOGY INFORMATION: NORMAL
TROPONIN I SERPL DL<=0.01 NG/ML-MCNC: <0.03 NG/ML
TSH SERPL DL<=0.005 MIU/L-ACNC: 1.31 UIU/ML (ref 0.34–5.6)
URN SPEC COLLECT METH UR: ABNORMAL
UROBILINOGEN UR STRIP-ACNC: NEGATIVE EU/DL
WBC # BLD AUTO: 4.73 K/UL (ref 3.9–12.7)
WBC #/AREA VAG WET PREP: ABNORMAL
YEAST GENITAL QL WET PREP: ABNORMAL

## 2020-07-21 PROCEDURE — 63600175 PHARM REV CODE 636 W HCPCS: Performed by: EMERGENCY MEDICINE

## 2020-07-21 PROCEDURE — U0002 COVID-19 LAB TEST NON-CDC: HCPCS

## 2020-07-21 PROCEDURE — 84484 ASSAY OF TROPONIN QUANT: CPT

## 2020-07-21 PROCEDURE — 83690 ASSAY OF LIPASE: CPT

## 2020-07-21 PROCEDURE — 85610 PROTHROMBIN TIME: CPT

## 2020-07-21 PROCEDURE — 87081 CULTURE SCREEN ONLY: CPT

## 2020-07-21 PROCEDURE — 85730 THROMBOPLASTIN TIME PARTIAL: CPT

## 2020-07-21 PROCEDURE — 84443 ASSAY THYROID STIM HORMONE: CPT

## 2020-07-21 PROCEDURE — 83735 ASSAY OF MAGNESIUM: CPT

## 2020-07-21 PROCEDURE — 25000003 PHARM REV CODE 250: Performed by: EMERGENCY MEDICINE

## 2020-07-21 PROCEDURE — 96367 TX/PROPH/DG ADDL SEQ IV INF: CPT

## 2020-07-21 PROCEDURE — 87491 CHLMYD TRACH DNA AMP PROBE: CPT

## 2020-07-21 PROCEDURE — 83880 ASSAY OF NATRIURETIC PEPTIDE: CPT

## 2020-07-21 PROCEDURE — 25500020 PHARM REV CODE 255: Performed by: EMERGENCY MEDICINE

## 2020-07-21 PROCEDURE — 81025 URINE PREGNANCY TEST: CPT | Performed by: EMERGENCY MEDICINE

## 2020-07-21 PROCEDURE — 81003 URINALYSIS AUTO W/O SCOPE: CPT

## 2020-07-21 PROCEDURE — 85025 COMPLETE CBC W/AUTO DIFF WBC: CPT

## 2020-07-21 PROCEDURE — 99285 EMERGENCY DEPT VISIT HI MDM: CPT | Mod: 25

## 2020-07-21 PROCEDURE — 80307 DRUG TEST PRSMV CHEM ANLYZR: CPT

## 2020-07-21 PROCEDURE — 87205 SMEAR GRAM STAIN: CPT

## 2020-07-21 PROCEDURE — 87210 SMEAR WET MOUNT SALINE/INK: CPT

## 2020-07-21 PROCEDURE — 96375 TX/PRO/DX INJ NEW DRUG ADDON: CPT

## 2020-07-21 PROCEDURE — 96365 THER/PROPH/DIAG IV INF INIT: CPT | Mod: 59

## 2020-07-21 PROCEDURE — 93005 ELECTROCARDIOGRAM TRACING: CPT | Performed by: INTERNAL MEDICINE

## 2020-07-21 PROCEDURE — 80053 COMPREHEN METABOLIC PANEL: CPT

## 2020-07-21 RX ORDER — HYDROXYZINE HYDROCHLORIDE 25 MG/1
25-50 TABLET, FILM COATED ORAL NIGHTLY PRN
COMMUNITY
End: 2021-05-12

## 2020-07-21 RX ORDER — DOXYCYCLINE 100 MG/1
100 CAPSULE ORAL 2 TIMES DAILY
Qty: 20 CAPSULE | Refills: 0 | Status: SHIPPED | OUTPATIENT
Start: 2020-07-21 | End: 2020-08-04

## 2020-07-21 RX ORDER — MORPHINE SULFATE 2 MG/ML
2 INJECTION, SOLUTION INTRAMUSCULAR; INTRAVENOUS
Status: COMPLETED | OUTPATIENT
Start: 2020-07-21 | End: 2020-07-21

## 2020-07-21 RX ORDER — PANTOPRAZOLE SODIUM 40 MG/1
40 TABLET, DELAYED RELEASE ORAL DAILY
Status: ON HOLD | COMMUNITY
End: 2023-04-26 | Stop reason: HOSPADM

## 2020-07-21 RX ORDER — TOPIRAMATE 200 MG/1
200 TABLET ORAL DAILY
COMMUNITY

## 2020-07-21 RX ADMIN — PROMETHAZINE HYDROCHLORIDE 12.5 MG: 25 INJECTION INTRAMUSCULAR; INTRAVENOUS at 05:07

## 2020-07-21 RX ADMIN — CEFTRIAXONE 1 G: 1 INJECTION, SOLUTION INTRAVENOUS at 05:07

## 2020-07-21 RX ADMIN — SODIUM CHLORIDE 1000 ML: 0.9 INJECTION, SOLUTION INTRAVENOUS at 05:07

## 2020-07-21 RX ADMIN — MORPHINE SULFATE 2 MG: 2 INJECTION, SOLUTION INTRAMUSCULAR; INTRAVENOUS at 05:07

## 2020-07-21 RX ADMIN — IOHEXOL 100 ML: 350 INJECTION, SOLUTION INTRAVENOUS at 04:07

## 2020-07-21 NOTE — DISCHARGE INSTRUCTIONS
Please read and follow discharge instructions and return precautions.  Please see your primary care physician in the next 1-2 days.  Please see your gynecologist in the next 2-3 days.  Return immediately if you develop new or worsening symptoms or if you have any new problems or concerns.  Please follow-up with your physician regarding pelvic culture results.  Tylenol or ibuprofen over-the-counter as directed if needed for pain.  Doxycycline as directed until completed or as directed by your physician on follow-up.

## 2020-07-22 NOTE — ED PROVIDER NOTES
Encounter Date: 7/21/2020       History     Chief Complaint   Patient presents with    Abdominal Pain    Nausea     This is a 40-year-old female who presents complaining of abdominal pain and nausea throughout the day today.  She has had a decreased appetite.  She denies vomiting.  She denies fever chills or malaise.  She denies any constitutional symptoms.  She describes the abdominal pain as a suprapubic pressure type pain that is worse with walking.  She states she is not sexually active.  She denies vaginal discharge or any dysuria frequency urgency.  Symptoms have been moderate in intensity with no other exacerbating factors other than walking and no alleviating factors.  She denies any cough, chest pain or shortness of breath.  She denies any gastrointestinal bleeding.  Her bowel movements have been normal for her--states she alternates between constipation and diarrhea.  She denies any other problems or complaints.        Review of patient's allergies indicates:   Allergen Reactions    Iron analogues Anaphylaxis     Infusion only. 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    Anxiety     B12 deficiency 8/15/2017    Bipolar disorder     Eating disorder     MTHFR mutation 8/15/2017    Pulmonary embolism     Vitamin D deficiency 8/15/2017     Past Surgical History:   Procedure Laterality Date    APPENDECTOMY      bowel obstruction      lysis of adhesions      OVARIAN CYST REMOVAL      uterine polyp removal       Family History   Problem Relation Age of Onset    Breast cancer Mother 58    Colon cancer Maternal Grandmother 62    Prostate cancer Maternal Grandfather      Social History     Tobacco Use    Smoking status: Never  Smoker    Smokeless tobacco: Never Used   Substance Use Topics    Alcohol use: No    Drug use: No     Review of Systems   Constitutional: Negative.  Negative for activity change, appetite change, chills, fatigue and fever.   HENT: Negative.  Negative for congestion, dental problem, ear pain, rhinorrhea, sinus pressure, sinus pain, sore throat and trouble swallowing.    Eyes: Negative.  Negative for photophobia, pain, redness and visual disturbance.   Respiratory: Negative.  Negative for cough, chest tightness, shortness of breath and wheezing.    Cardiovascular: Negative.  Negative for chest pain, palpitations and leg swelling.   Gastrointestinal: Positive for abdominal pain and nausea. Negative for abdominal distention, anal bleeding, blood in stool, constipation, diarrhea and vomiting.   Endocrine: Negative.    Genitourinary: Positive for pelvic pain. Negative for decreased urine volume, difficulty urinating, dysuria, flank pain, frequency, hematuria, urgency, vaginal bleeding, vaginal discharge and vaginal pain.   Musculoskeletal: Negative.  Negative for arthralgias, back pain, gait problem, joint swelling, myalgias, neck pain and neck stiffness.   Skin: Negative.  Negative for color change, pallor and rash.   Neurological: Negative.  Negative for dizziness, tremors, seizures, syncope, facial asymmetry, speech difficulty, weakness, light-headedness, numbness and headaches.   Hematological: Negative.  Does not bruise/bleed easily.   Psychiatric/Behavioral: Negative.  Negative for confusion.   All other systems reviewed and are negative.      Physical Exam     Initial Vitals [07/21/20 1310]   BP Pulse Resp Temp SpO2   (!) 140/89 (!) 120 18 97.9 °F (36.6 °C) 100 %      MAP       --         Physical Exam    Nursing note and vitals reviewed.  Constitutional: She appears well-developed. She is not diaphoretic. She is active and cooperative.  Non-toxic appearance. She does not have a sickly appearance. She does not  appear ill. No distress.   HENT:   Head: Normocephalic and atraumatic.   Right Ear: Tympanic membrane normal.   Left Ear: Tympanic membrane normal.   Nose: Nose normal.   Mouth/Throat: Uvula is midline, oropharynx is clear and moist and mucous membranes are normal. No oral lesions. No uvula swelling. No oropharyngeal exudate, posterior oropharyngeal edema or posterior oropharyngeal erythema.   Eyes: Conjunctivae, EOM and lids are normal. Pupils are equal, round, and reactive to light. No scleral icterus.   Neck: Trachea normal, normal range of motion, full passive range of motion without pain and phonation normal. Neck supple. No thyroid mass and no thyromegaly present. No stridor present. No spinous process tenderness and no muscular tenderness present. No tracheal deviation, no edema, no erythema and normal range of motion present. No neck rigidity. No JVD present.   Cardiovascular: Normal rate, regular rhythm, normal heart sounds, intact distal pulses and normal pulses. Exam reveals no gallop and no friction rub.    No murmur heard.  Pulmonary/Chest: Effort normal and breath sounds normal. No accessory muscle usage or stridor. No tachypnea. No respiratory distress. She has no wheezes. She has no rhonchi. She has no rales.   Abdominal: Soft. Normal appearance and bowel sounds are normal. She exhibits no distension, no pulsatile midline mass and no mass. There is abdominal tenderness in the suprapubic area. There is no rigidity, no rebound, no guarding, no CVA tenderness, no tenderness at McBurney's point and negative Villatoro's sign. No hernia.   Genitourinary:    Vagina normal.   Uterus is tender. Cervix exhibits motion tenderness, discharge and friability. Right adnexum displays no mass, no tenderness and no fullness. Left adnexum displays no mass, no tenderness and no fullness.    Genitourinary Comments: Mild cervical motion tenderness.  Yellow appearing discharge.     Musculoskeletal: Normal range of motion. No  tenderness or edema.      Comments: Pulses are 2+ throughout, cap refill is less than 2 sec throughout, extremities are nontender throughout with full range of motion. There is no spinal tenderness to palpation.   Neurological: She is alert and oriented to person, place, and time. She has normal strength. She displays normal reflexes. No cranial nerve deficit or sensory deficit.   No focal deficits.   Skin: Skin is warm, dry and intact. Capillary refill takes less than 2 seconds. No ecchymosis, no petechiae and no rash noted. No erythema. No pallor.   Psychiatric: She has a normal mood and affect. Her speech is normal and behavior is normal. Judgment and thought content normal. Cognition and memory are normal.         ED Course   Procedures  Labs Reviewed   APTT - Abnormal; Notable for the following components:       Result Value    aPTT 33.7 (*)     All other components within normal limits   COMPREHENSIVE METABOLIC PANEL - Abnormal; Notable for the following components:    Alkaline Phosphatase 35 (*)     All other components within normal limits   PROTIME-INR - Abnormal; Notable for the following components:    PT 15.9 (*)     All other components within normal limits   URINALYSIS - Abnormal; Notable for the following components:    Appearance, UA Hazy (*)     pH, UA >8.0 (*)     Protein, UA Trace (*)     All other components within normal limits    Narrative:     Specimen Source->Urine   VAGINAL SCREEN - Abnormal; Notable for the following components:    Clue Cells Few (*)     WBC - Vaginal Screen Occasional (*)     Bacteria - Vaginal Screen Moderate (*)     All other components within normal limits   CULTURE, GONOCOCCUS   C. TRACHOMATIS/N. GONORRHOEAE BY AMP DNA   B-TYPE NATRIURETIC PEPTIDE   CBC W/ AUTO DIFFERENTIAL   DRUG SCREEN PANEL, URINE EMERGENCY    Narrative:     Specimen Source->Urine   LIPASE   MAGNESIUM   TROPONIN I   TSH   SARS-COV-2 RNA AMPLIFICATION, QUAL   POCT URINE PREGNANCY        ECG Results           EKG 12-lead (In process)  Result time 07/21/20 15:15:09    In process by Interface, Lab In [facility] (07/21/20 15:15:09)                 Narrative:    Test Reason : R10.9,    Vent. Rate : 078 BPM     Atrial Rate : 078 BPM     P-R Int : 150 ms          QRS Dur : 082 ms      QT Int : 408 ms       P-R-T Axes : 071 038 041 degrees     QTc Int : 465 ms    Normal sinus rhythm  Possible Left atrial enlargement  Borderline Abnormal ECG  No previous ECGs available    Referred By: [email or web address]   SELF           Confirmed By:                             Imaging Results          CT Abdomen Pelvis With Contrast (Final result)  Result time 07/21/20 16:47:18    Final result by Jewel Mcqueen MD (07/21/20 16:47:18)                 Narrative:    CMS MANDATED QUALITY DATA - CT RADIATION  436    All CT scans at this facility utilize dose modulation, iterative  reconstruction, and/or weight based dosing when appropriate to reduce  radiation dose to as low as reasonably achievable.    CLINICAL HISTORY:  40 years (1980) Female Abdominal infection suspected    TECHNIQUE:  CT ABDOMEN PELVIS WITH CONTRAST. 319 images obtained. Axial CT images  of the abdomen and pelvis were obtained from the dome of the diaphragm  to the proximal thigh.    CONTRAST:  IV contrast was administered uneventfully.    COMPARISON:  CT most recently from May 17, 2018.    FINDINGS:.  Lower Thorax:  Please see the dedicated CTA from the same day.    CT Abdomen:  Liver: The liver is unchanged in size and imaging appearance with a 5  mm diameter likely cyst in the right lobe.  Gallbladder: The gallbladder is within normal limits.  Biliary Tree: No intra or extrahepatic ductal dilation.  Spleen: Within normal limits.  Pancreas: The pancreas is normal.  Adrenal Glands: The adrenal glands appear within normal limits.  Kidneys: The kidneys are normal in imaging appearance without  hydronephrosis or hydroureter.  Vasculature: The aorta is normal in course and  caliber.  Lymph nodes: No abdominal lymphadenopathy is seen.  Intraperitoneal structures: There is no ascites.  Bowel: The partially filled bowel is within normal limits with a  nonobstructive bowel gas pattern. The appendix is not seen, surgically  absent.  Abdominal wall: The abdominal wall and musculature are normal.  Musculoskeletal: No acute osseous abnormality is identified .    CT Pelvis:  Bladder: The urinary bladder is within normal limits.  Reproductive Organs: Simple appearing ovarian cysts are seen measuring  up to 2.6 cm on the left and 1.5 cm on the right. Mildly heterogeneous  appearing uterus with small myometrial masses favored to represent  small fibroids, these can be better assessed with nonemergent  outpatient ultrasound.  Pelvic Lymph nodes: No pelvic lymphadenopathy or pelvic mass is  identified.    IMPRESSION:  No acute abdominal or pelvic abnormality, with likely incidental  findings as noted above.                        [facility] Best Practice Guidelines:  Benign-appearing simple  adnexal cysts on CT with IV contrast and MR: (1)(2)      Pre-menopause(3) (<= 50 years if LMP unknown):  <=5 cm: No follow-up imaging recommended  >5 cm - <=7 cm:  US f/u 6-12 weeks  >7 cm: Consider MR w/IVC or surgical evaluation    Early post-menopause (<=5 years from LMP; > 50 years to <= 55 years if  LMP unknown):  <=3 cm: No follow-up imaging recommended (4)  >3 cm - <=5 cm: US f/u 6-12 months (4)  >5 cm - <=7 cm: US f/u promptly  >7cm: Consider MR w/IVC or surgical evaluation    Late post-menopause (>5 years from LMP; > 55 years if LMP unknown):  <=3 cm: No follow-up imaging recommended  >3 cm - <=7 cm: US f/u promptly  >7 cm: Consider MR w/IVC or surgical evaluation      Recommendations for Probably benign adnexal cysts  on CT and MR:(1)(2)  (benign-appearing cysts on non IV-contrast  CT or with one or more of the following complicating factors:  angulated margins, not round or oval, poorly  visualized such  as obscured by artifact or low S/N.)    Pre-menopause (<= 50 years if LMP unknown):  <=3 cm: No follow-up imaging recommended  >3 cm - <=5 cm: US f/u 6-12 weeks  >5 cm - <=7 cm: US f/u promptly  >7 cm: Consider MR w/IVC or surgical evaluation    Early post-menopause (<=5 years from LMP; > 50 years to <= 55 years if  LMP unknown):  <=3 cm: No follow-up imaging recommended  >3 cm - <=7 cm: US f/u promptly  >7 cm: Consider MR w/IVC or surgical evaluation    Late post-menopause (>5 years from LMP; > 55 years if LMP unknown):  <=1 cm: No follow-up imaging recommended  >1 cm - <=7 cm: US f/u promptly  >7 cm: Consider MR w/IVC or surgical evaluation    ______________________________________________________________________  _____________  (1)Recommendations based on the 2013 ACR White Paper for Managing  Incidental Adnexal Findings on Abdominal and Pelvic CT and MRI: J Am  [employer] Radiol 2013;10:675-681  (2)Excludes normal/benign findings such as ovarian calcifications w/o  associated non-calcified mass, corpus luteum cyst, previously  characterized cyst and cyst with documented stability in size and  appearance for >2 years  (3)Includes cysts with layering hemorrhage  (4)If internal hemorrhage suspected in cyst, US f/u 6-12 weeks    .    Electronically Signed by ANGELLA Mesa on 7/21/2020 4:52 PM                             CTA Chest Non-Coronary (PE Study) (Final result)  Result time 07/21/20 16:45:18    Final result by Ej Silver MD (07/21/20 16:45:18)                 Narrative:    CMS MANDATED QUALITY DATA-CT RADIATION DOSE-436    All CT scans at this facility use dose modulation, iterative  reconstruction, and or weight-based dosing when appropriate to reduce  radiation dose to as low as reasonably achievable.    HISTORY: Pulmonary embolism suspected, high pretest probability,  dyspnea.    FINDINGS: Thin axial imaging through the chest was performed with 100  mL Omnipaque 350 IV contrast,  with sagittal and coronal reformatted  images and MIP reconstructions performed, and images stored in the  patient's permanent electronic medical record.    Comparison to multiple prior exams. There are no pulmonary arterial  filling defects to suggest pulmonary thromboembolism. The central  pulmonary arteries are normal in caliber.    The aorta enhances normally and tapers appropriately, with no aortic  dissection. The heart is normal in size, with no pericardial effusion.  There is no mediastinal or hilar lymph node enlargement, with no  mediastinal mass or fluid collection.    The lungs are well expanded, with no consolidation, pleural effusion,  evidence of pulmonary edema, or pneumothorax. The central airways are  patent. There is no bronchial wall thickening or mucous plugging.    Images of the upper abdomen are unremarkable. There are no significant  osseous abnormalities.    IMPRESSION: Negative CTA chest.    Electronically Signed by Ej GARCIA on 7/21/2020 4:51 PM                             X-Ray Chest AP Portable (Final result)  Result time 07/21/20 14:49:02   Procedure changed from X-Ray Chest 1 View     Final result by Herbert Mayfield MD (07/21/20 14:49:02)                 Narrative:    Chest single view    Clinical data:Abdominal pain.    FINDINGS: AP view of the chest demonstrates no cardiac, pulmonary, or  osseous abnormalities.    IMPRESSION:  1. Normal chest single view.    Electronically Signed by Abelardo Mayfield M.D. on 7/21/2020 2:51 PM                               Medical Decision Making:   Clinical Tests:   Lab Tests: Reviewed  Radiological Study: Reviewed  Medical Tests: Reviewed  ED Management:  CT with no evidence of obstruction.  Incidental findings discussed and need for follow-up discussed.  Patient is currently hemodynamically stable afebrile and in no acute distress.  Does have findings of possible PID although states she is not sexually active.  UPT is negative.  Patient is  receive Rocephin and will be prescribed doxycycline.  Have explained the need for follow-up with her gynecologist and primary care physician.  Have discussed pelvic rest and return precautions have been discussed in detail.  Abdominal exam is soft and nontender in all quadrants.                                  Clinical Impression:       ICD-10-CM ICD-9-CM   1. Pelvic inflammatory disease  N73.9 614.9   2. Abdominal pain  R10.9 789.00             ED Disposition Condition    Discharge Stable        ED Prescriptions     Medication Sig Dispense Start Date End Date Auth. Provider    doxycycline (VIBRAMYCIN) 100 MG Cap Take 1 capsule (100 mg total) by mouth 2 (two) times daily. for 14 days 20 capsule 7/21/2020 8/4/2020 Alessia Morgan MD        Follow-up Information     Follow up With Specialties Details Why Contact Info    Lizy Diamond MD Pediatrics Schedule an appointment as soon as possible for a visit in 1 day And important to follow up with your gynecologist in the next 2-3 days. [address] [facility]  [address]LO[address]  [facility] [address]  [phone]                                       Alessia Morgan MD  07/21/20 [id number]

## 2020-07-22 NOTE — ED NOTES
Christa in quality reported a patient concern - pt only reports having 20 capsules of antibiotic ordered when the directions state take BID x14 days.  Writer spoke with Dr. Morgan, who ordered Rx, who would like pt to follow-up with OB as directed for confirmation of culture results.  If neg. Culture results - pt wouldn't need any more antibiotics.  If positive culture results, OB MD would be able to order more if needed.      Called patient at 1625 on 7/22/20 to inform her of Dr. Morgan' recommendation.  Patient understands and agrees with follow up care.

## 2020-07-25 LAB
BACTERIA GENITAL AEROBE CULT: NORMAL
GRAM STN SPEC: NORMAL

## 2020-07-28 LAB
CHLAMYDIA, AMPLIFIED DNA: NEGATIVE
N GONORRHOEAE, AMPLIFIED DNA: NEGATIVE

## 2020-08-25 NOTE — PROGRESS NOTES
[facility] Hematology/Oncology  PROGRESS NOTE      Subjective:       Patient ID:   NAME: Janeth Siddiqi : 1980     40 y.o. female    Referring Doc: Lizy Diamond  Other Physicians: Jere Noriega; Shazia; Az    Chief Complaint:  Anemia/b12 f/u    History of Present Illness:     Patient returns today for a regularly scheduled follow-up visit.  The patient is here with her mom and grand-mother. She has been having irregular  menstrual bleeding again. She has been diagnosed with SVT and seeing Dr Newell. She is on eliquis. No CP, SOB, HA's or N/V. She previously had self-discontinued the B12.     Discussed covid 19 precautions      ROS:   GEN: normal without any fever, night sweats or weight loss  HEENT: normal with no HA's, sore throat, stiff neck, changes in vision  CV: normal with no CP, SOB, PND, KRAUSE or orthopnea  PULM: normal with no SOB, cough, hemoptysis, sputum or pleuritic pain  GI: normal with no abdominal pain, nausea, vomiting, constipation, diarrhea, melanotic stools, BRBPR, or hematemesis  : normal with no hematuria, dysuria  BREAST: normal with no mass, discharge, pain  SKIN: normal with no rash, erythema, bruising, or swelling    Allergies:  Review of patient's allergies indicates:   Allergen Reactions    Iron analogues Anaphylaxis     Infusion only. Tolerates po    Bactrim [sulfamethoxazole-trimethoprim] Swelling    Lidocaine Swelling    Nsaids (non-steroidal anti-inflammatory drug)      cannot take NSAIDs- upsets the stomach    Zofran [ondansetron hcl (pf)] Nausea Only       Medications:    Current Outpatient Medications:     ELIQUIS 5 mg Tab, TAKE 1 TABLET BY MOUTH TWICE DAILY (Patient taking differently: Take 5 mg by mouth 2 (two) times daily. ), Disp: 60 tablet, Rfl: 6    ergocalciferol (ERGOCALCIFEROL) 50,000 unit Cap, TAKE 1 CAPSULE BY MOUTH EVERY 7 DAYS (Patient taking differently: Take 50,000 Units by mouth every 7 days. ), Disp: 4 capsule, Rfl: 6    ferrous sulfate  324 mg (65 mg iron) TbEC, Take 1 tablet (324 mg total) by mouth 2 (two) times daily., Disp: 60 tablet, Rfl: 3    hydrOXYzine HCL (ATARAX) 25 MG tablet, Take 25-50 mg by mouth nightly as needed for Itching., Disp: , Rfl:     LINZESS 290 mcg Cap, Take 290 mcg by mouth once daily. , Disp: , Rfl: 1    multivitamin capsule, Take 1 capsule by mouth once daily., Disp: , Rfl:     norethindrone-e.estradiol-iron (TARINA FE 1/20, 28, ORAL), Take 1 tablet by mouth once daily., Disp: , Rfl:     pantoprazole (PROTONIX) 40 MG tablet, Take 40 mg by mouth once daily., Disp: , Rfl:     topiramate (TOPAMAX) 200 MG Tab, Take 200 mg by mouth once daily., Disp: , Rfl:     trazodone (DESYREL) 150 MG tablet, Take 300 mg by mouth nightly. , Disp: , Rfl: 0    PMHx/PSHx Updates:  See patient's last visit with me on 2/19/2020  See H&P on 12/7/16        Pathology:  none          Objective:     Vitals:  Blood pressure 104/71, pulse 77, temperature 98.1 °F (36.7 °C), resp. rate 19, weight 51.5 kg (113 lb 9.6 oz).    Physical Examination:   GEN: no apparent distress, comfortable; AAOx3  HEAD: atraumatic and normocephalic  EYES: no pallor, no icterus, PERRLA  ENT: OMM, no pharyngeal erythema, external ears WNL; no nasal discharge; no thrush  NECK: no masses, thyroid normal, trachea midline, no LAD/LN's, supple  CV: RRR with no murmur; normal pulse; normal S1 and S2; no pedal edema  CHEST: Normal respiratory effort; CTAB; normal breath sounds; no wheeze or crackles  ABDOM: nontender and nondistended; soft; normal bowel sounds; no rebound/guarding  MUSC/Skeletal: ROM normal; no crepitus; joints normal; no deformities or arthropathy  EXTREM: no clubbing, cyanosis, inflammation or swelling  SKIN: no rashes, lesions, ulcers, petechiae or subcutaneous nodules  : no reza  NEURO: grossly intact; motor/sensory WNL; AAOx3; no tremors  PSYCH: normal mood, affect and behavior  LYMPH: normal cervical, supraclavicular, axillary and groin  LN's            Labs:          Lab Results   Component Value Date    IRON 125 08/07/2020    TIBC 216 (L) 08/07/2020    FERRITIN 124 08/07/2020            Lab Results   Component Value Date    WBC 4.8 08/07/2020    HGB 13.9 08/07/2020    HCT 43.4 08/07/2020    MCV 91.2 08/07/2020     08/07/2020     BMP  Lab Results   Component Value Date     08/07/2020    K 4.3 08/07/2020     08/07/2020    CO2 24 08/07/2020    BUN 14 08/07/2020    CREATININE 0.92 08/07/2020    CALCIUM 8.8 08/07/2020    ANIONGAP 10 07/21/2020    ESTGFRAFRICA 91 08/07/2020    EGFRNONAA 78 08/07/2020      Lab Results   Component Value Date    IRON 125 08/07/2020    TIBC 216 (L) 08/07/2020    FERRITIN 124 08/07/2020         Radiology/Diagnostic Studies:    X-ray Abdomen Flat And Erect    Result Date: 7/21/2017  EXAM: KUB. INDICATION: Obstruction. COMPARISON: KUB 1/1/14. FINDINGS: Supine and upright radiographs of the abdomen demonstrate a nonobstructive bowel gas pattern. There is no free air. There is no pneumatosis. There is no portal venous gas. There is calcification. Lung bases are clear. There is no osseous abnormality.    1.Nonobstructed bowel gas pattern. Electronically signed by: KELBY DENNIS MD Date:     07/21/17 Time:    13:49     Ct Abdomen Pelvis With Contrast    Result Date: 7/21/2017  Procedure: CT of the abdomen and pelvis with IV contrast. Technique: Axial images of the abdomen and pelvis were obtained with intravenous contrast administration. Coronal and sagittal reconstructions were provided. Total DLP was 691 mGy-cm.  Dose lowering technique, automated exposure control, was utilized for this exam. History: Left-sided abdominal pain and vomiting. Comparison: CT of the abdomen and pelvis 7/31/2015. Findings: The bilateral lung bases are clear. The heart is normal in size. There is a 7 mm focus of arterial enhancement within segment 4B of the liver which is isodense to the adjacent liver on delayed phase. This most  consistent with a flash filling hemangioma. The portal vein is patent. The spleen, pancreas, and adrenal glands are normal. Bilateral kidneys are normal. There is no hydronephrosis or nephrolithiasis. The stomach and small bowel are decompressed. The appendix is not visualized, however there is no right lower quadrant inflammatory stranding. The colon is normal. The uterus and adnexa are normal for age. Urinary bladder is normal. There is trace physiologic free fluid in the lower pelvis. There is no pelvic or retroperitoneal adenopathy. The abdominal aorta is non-aneurysmal. There is no lytic or blastic osseous lesions.     No acute abnormality of the abdomen and pelvis. Electronically signed by: TINA DENNIS MD Date:     07/21/17 Time:    15:37       I have reviewed all available lab results and radiology reports.    Assessment/Plan:   (1) 40 y.o. female with diagnosis of iron deficiency anemia and B12 deficiency  - she was previously on oral iron   - latest ferritin is 124 and much better  - B12 adeqaute  - hgb is 13.9 and WNL and stable    (2) prior Pulmonary emboli with prior use of OCP's  - on oral anticoagulant drug Eliquis  - underlying MTHFR-A heterozygous and ATIII deficiency issues  - followed by Dr Mccray with Pulm and he ordered several tests and  workup for cystic fibrosis which was negative    (3) Crohn's disorder/pancreatitis - followed by Dr Noriega with GI in past and now Dr tina Sinclair    (4) Chronic GYN bleeding (resolved) - followed by Dr Gigi Oquendo with GYN  - latest hgb wnl  - she is on OCP's still and thus she needs to probably remain on the blood thinners    (5) prior Vit D deficiency    (6) She saw Dr Khoobehi with plastics in [place] and had a Voluma skin filler without any difficulties     (7) SVT - seeing Dr Bernardo  and now on medications      1. MTHFR mutation     2. B12 deficiency     3. Other iron deficiency anemia     4. Other pulmonary embolism without acute cor pulmonale,  unspecified chronicity     5. Antithrombin III deficiency         PLAN:  1. Check labs every 3 months; refill eliquis  2. F/u with PCP , GYN, and pulm  3. RTC  in 6 months  Fax note to Schuette, Striplin, Khoobehi, Kate Brown, Hutchings; [place]    Discussion:       COVID-19 Discussion:    I had long discussion with patient and any applicable family about the COVID-19 coronavirus epidemic and the recommended precautions with regard to cancer and/or hematology patients. I have re-iterated the CDC recommendations for adequate hand washing, use of hand -like products, and coughing into elbow, etc. In addition, especially for our patients who are on chemotherapy and/or our otherwise immunocompromised patients, I have recommended avoidance of crowds, including movie theaters, restaurants, churches, etc. I have recommended avoidance of any sick or symptomatic family members and/or friends. I have also recommended avoidance of any raw and unwashed food products, and general avoidance of food items that have not been prepared by themselves. The patient has been asked to call us immediately with any symptom developments, issues, questions or other general concerns.     I have explained all of the above in detail and the patient understands all of the current recommendation(s). I have answered all of their questions to the best of my ability and to their complete satisfaction.   The patient is to continue with the current management plan.            Electronically signed by Miguel Hurst MD        Answers for HPI/ROS submitted by the patient on 8/25/2020   appetite change : No  unexpected weight change: Yes  mouth sores: No  visual disturbance: No  cough: No  shortness of breath: Yes  chest pain: Yes  abdominal pain: Yes  diarrhea: No  frequency: No  back pain: No  rash: No  headaches: No  adenopathy: No  nervous/ anxious: No

## 2020-08-27 ENCOUNTER — OFFICE VISIT (OUTPATIENT)
Dept: HEMATOLOGY/ONCOLOGY | Facility: CLINIC | Age: 40
End: 2020-08-27
Payer: MEDICAID

## 2020-08-27 VITALS
HEART RATE: 77 BPM | WEIGHT: 113.63 LBS | DIASTOLIC BLOOD PRESSURE: 71 MMHG | RESPIRATION RATE: 19 BRPM | TEMPERATURE: 98 F | BODY MASS INDEX: 19.5 KG/M2 | SYSTOLIC BLOOD PRESSURE: 104 MMHG

## 2020-08-27 DIAGNOSIS — E53.8 B12 DEFICIENCY: ICD-10-CM

## 2020-08-27 DIAGNOSIS — D50.8 OTHER IRON DEFICIENCY ANEMIA: ICD-10-CM

## 2020-08-27 DIAGNOSIS — Z15.89 MTHFR MUTATION: Primary | ICD-10-CM

## 2020-08-27 DIAGNOSIS — I26.99 OTHER PULMONARY EMBOLISM WITHOUT ACUTE COR PULMONALE, UNSPECIFIED CHRONICITY: ICD-10-CM

## 2020-08-27 DIAGNOSIS — D68.59 ANTITHROMBIN III DEFICIENCY: ICD-10-CM

## 2020-08-27 PROCEDURE — 99213 PR OFFICE/OUTPT VISIT, EST, LEVL III, 20-29 MIN: ICD-10-PCS | Mod: S$GLB,,, | Performed by: INTERNAL MEDICINE

## 2020-08-27 PROCEDURE — 99213 OFFICE O/P EST LOW 20 MIN: CPT | Mod: S$GLB,,, | Performed by: INTERNAL MEDICINE

## 2020-09-04 ENCOUNTER — TELEPHONE (OUTPATIENT)
Dept: CARDIOLOGY | Facility: CLINIC | Age: 40
End: 2020-09-04

## 2020-09-04 NOTE — TELEPHONE ENCOUNTER
----- Message from Adali Allison sent at 9/3/2020  2:33 PM CDT -----  Regarding: Drug Interaction  Dr. Cohen wants to make sure its ok for her to be taking Diltiazem and Eliquis together  [phone]

## 2020-09-04 NOTE — TELEPHONE ENCOUNTER
Dr Schaffer wants to make sure its ok for pt to take Eliquis and diltiazem together please let me know and ill contack his office and pt

## 2021-01-01 ENCOUNTER — HOSPITAL ENCOUNTER (EMERGENCY)
Facility: HOSPITAL | Age: 41
Discharge: LEFT BEFORE DISCHARGE | End: 2021-01-01
Payer: MEDICAID

## 2021-01-01 VITALS
DIASTOLIC BLOOD PRESSURE: 75 MMHG | HEIGHT: 64 IN | WEIGHT: 104 LBS | BODY MASS INDEX: 17.75 KG/M2 | OXYGEN SATURATION: 99 % | SYSTOLIC BLOOD PRESSURE: 122 MMHG | RESPIRATION RATE: 18 BRPM | HEART RATE: 90 BPM | TEMPERATURE: 99 F

## 2021-01-01 LAB
ALBUMIN SERPL BCP-MCNC: 4.2 G/DL (ref 3.5–5.2)
ALP SERPL-CCNC: 36 U/L (ref 55–135)
ALT SERPL W/O P-5'-P-CCNC: 12 U/L (ref 10–44)
ANION GAP SERPL CALC-SCNC: 8 MMOL/L (ref 8–16)
AST SERPL-CCNC: 19 U/L (ref 10–40)
BASOPHILS # BLD AUTO: 0.05 K/UL (ref 0–0.2)
BASOPHILS NFR BLD: 0.8 % (ref 0–1.9)
BILIRUB SERPL-MCNC: 0.5 MG/DL (ref 0.1–1)
BUN SERPL-MCNC: 13 MG/DL (ref 6–20)
CALCIUM SERPL-MCNC: 9.1 MG/DL (ref 8.7–10.5)
CHLORIDE SERPL-SCNC: 112 MMOL/L (ref 95–110)
CO2 SERPL-SCNC: 19 MMOL/L (ref 23–29)
CREAT SERPL-MCNC: 1 MG/DL (ref 0.5–1.4)
DIFFERENTIAL METHOD: ABNORMAL
EOSINOPHIL # BLD AUTO: 0 K/UL (ref 0–0.5)
EOSINOPHIL NFR BLD: 0.5 % (ref 0–8)
ERYTHROCYTE [DISTWIDTH] IN BLOOD BY AUTOMATED COUNT: 14.9 % (ref 11.5–14.5)
EST. GFR  (AFRICAN AMERICAN): >60 ML/MIN/1.73 M^2
EST. GFR  (NON AFRICAN AMERICAN): >60 ML/MIN/1.73 M^2
GLUCOSE SERPL-MCNC: 105 MG/DL (ref 70–110)
GLUCOSE SERPL-MCNC: 88 MG/DL (ref 70–110)
HCT VFR BLD AUTO: 41 % (ref 37–48.5)
HGB BLD-MCNC: 13.4 G/DL (ref 12–16)
IMM GRANULOCYTES # BLD AUTO: 0.01 K/UL (ref 0–0.04)
IMM GRANULOCYTES NFR BLD AUTO: 0.2 % (ref 0–0.5)
LYMPHOCYTES # BLD AUTO: 2.7 K/UL (ref 1–4.8)
LYMPHOCYTES NFR BLD: 41.8 % (ref 18–48)
MCH RBC QN AUTO: 28.8 PG (ref 27–31)
MCHC RBC AUTO-ENTMCNC: 32.7 G/DL (ref 32–36)
MCV RBC AUTO: 88 FL (ref 82–98)
MONOCYTES # BLD AUTO: 0.6 K/UL (ref 0.3–1)
MONOCYTES NFR BLD: 8.7 % (ref 4–15)
NEUTROPHILS # BLD AUTO: 3.1 K/UL (ref 1.8–7.7)
NEUTROPHILS NFR BLD: 48 % (ref 38–73)
NRBC BLD-RTO: 0 /100 WBC
PLATELET # BLD AUTO: 235 K/UL (ref 150–350)
PMV BLD AUTO: 9.7 FL (ref 9.2–12.9)
POTASSIUM SERPL-SCNC: 3.8 MMOL/L (ref 3.5–5.1)
PROT SERPL-MCNC: 7.5 G/DL (ref 6–8.4)
RBC # BLD AUTO: 4.65 M/UL (ref 4–5.4)
SODIUM SERPL-SCNC: 139 MMOL/L (ref 136–145)
TSH SERPL DL<=0.005 MIU/L-ACNC: 2.24 UIU/ML (ref 0.34–5.6)
WBC # BLD AUTO: 6.46 K/UL (ref 3.9–12.7)

## 2021-01-01 PROCEDURE — 85025 COMPLETE CBC W/AUTO DIFF WBC: CPT

## 2021-01-01 PROCEDURE — 36415 COLL VENOUS BLD VENIPUNCTURE: CPT

## 2021-01-01 PROCEDURE — 84443 ASSAY THYROID STIM HORMONE: CPT

## 2021-01-01 PROCEDURE — 80053 COMPREHEN METABOLIC PANEL: CPT

## 2021-01-01 PROCEDURE — 99900041 HC LEFT WITHOUT BEING SEEN- EMERGENCY

## 2021-01-15 ENCOUNTER — HOSPITAL ENCOUNTER (OUTPATIENT)
Dept: RADIOLOGY | Facility: HOSPITAL | Age: 41
Discharge: HOME | End: 2021-01-15
Attending: NURSE PRACTITIONER
Payer: MEDICAID

## 2021-01-15 DIAGNOSIS — R06.02 SHORTNESS OF BREATH: ICD-10-CM

## 2021-01-15 DIAGNOSIS — R06.02 SHORTNESS OF BREATH: Primary | ICD-10-CM

## 2021-01-15 PROCEDURE — 71046 X-RAY EXAM CHEST 2 VIEWS: CPT | Mod: TC

## 2021-02-19 ENCOUNTER — TELEPHONE (OUTPATIENT)
Dept: HEMATOLOGY/ONCOLOGY | Facility: CLINIC | Age: 41
End: 2021-02-19

## 2021-02-23 ENCOUNTER — OFFICE VISIT (OUTPATIENT)
Dept: HEMATOLOGY/ONCOLOGY | Facility: CLINIC | Age: 41
End: 2021-02-23
Payer: MEDICAID

## 2021-02-23 VITALS
HEIGHT: 64 IN | SYSTOLIC BLOOD PRESSURE: 93 MMHG | HEART RATE: 74 BPM | DIASTOLIC BLOOD PRESSURE: 65 MMHG | BODY MASS INDEX: 19.12 KG/M2 | RESPIRATION RATE: 18 BRPM | WEIGHT: 112 LBS

## 2021-02-23 DIAGNOSIS — E53.8 B12 DEFICIENCY: ICD-10-CM

## 2021-02-23 DIAGNOSIS — D50.8 OTHER IRON DEFICIENCY ANEMIA: Primary | ICD-10-CM

## 2021-02-23 DIAGNOSIS — Z15.89 MTHFR MUTATION: ICD-10-CM

## 2021-02-23 DIAGNOSIS — D68.59 ANTITHROMBIN III DEFICIENCY: ICD-10-CM

## 2021-02-23 DIAGNOSIS — I26.99 OTHER PULMONARY EMBOLISM WITHOUT ACUTE COR PULMONALE, UNSPECIFIED CHRONICITY: ICD-10-CM

## 2021-02-23 PROCEDURE — 99213 PR OFFICE/OUTPT VISIT, EST, LEVL III, 20-29 MIN: ICD-10-PCS | Mod: S$GLB,,, | Performed by: INTERNAL MEDICINE

## 2021-02-23 PROCEDURE — 99213 OFFICE O/P EST LOW 20 MIN: CPT | Mod: S$GLB,,, | Performed by: INTERNAL MEDICINE

## 2021-02-23 RX ORDER — DILTIAZEM HYDROCHLORIDE 30 MG/1
30 TABLET, FILM COATED ORAL DAILY
COMMUNITY
End: 2021-05-12 | Stop reason: SDUPTHER

## 2021-03-02 DIAGNOSIS — R06.02 SHORTNESS OF BREATH: Primary | ICD-10-CM

## 2021-03-12 ENCOUNTER — TELEPHONE (OUTPATIENT)
Dept: GASTROENTEROLOGY | Facility: CLINIC | Age: 41
End: 2021-03-12

## 2021-03-19 ENCOUNTER — HOSPITAL ENCOUNTER (OUTPATIENT)
Dept: PULMONOLOGY | Facility: HOSPITAL | Age: 41
Discharge: HOME | End: 2021-03-19
Attending: NURSE PRACTITIONER
Payer: MEDICAID

## 2021-03-19 DIAGNOSIS — R06.02 SHORTNESS OF BREATH: ICD-10-CM

## 2021-03-19 PROCEDURE — 94727 GAS DIL/WSHOT DETER LNG VOL: CPT

## 2021-03-19 PROCEDURE — 94729 DIFFUSING CAPACITY: CPT

## 2021-03-19 PROCEDURE — 94010 BREATHING CAPACITY TEST: CPT

## 2021-04-29 ENCOUNTER — PATIENT MESSAGE (OUTPATIENT)
Dept: RESEARCH | Facility: HOSPITAL | Age: 41
End: 2021-04-29

## 2021-05-11 ENCOUNTER — TELEPHONE (OUTPATIENT)
Dept: CARDIOLOGY | Facility: CLINIC | Age: 41
End: 2021-05-11

## 2021-05-12 ENCOUNTER — OFFICE VISIT (OUTPATIENT)
Dept: CARDIOLOGY | Facility: CLINIC | Age: 41
End: 2021-05-12
Payer: MEDICAID

## 2021-05-12 VITALS
HEIGHT: 64 IN | HEART RATE: 91 BPM | BODY MASS INDEX: 18.27 KG/M2 | SYSTOLIC BLOOD PRESSURE: 114 MMHG | OXYGEN SATURATION: 99 % | WEIGHT: 107 LBS | DIASTOLIC BLOOD PRESSURE: 68 MMHG

## 2021-05-12 DIAGNOSIS — R94.31 PROLONGED Q-T INTERVAL ON ECG: ICD-10-CM

## 2021-05-12 DIAGNOSIS — I34.1 MVP (MITRAL VALVE PROLAPSE): Primary | ICD-10-CM

## 2021-05-12 DIAGNOSIS — R07.89 ATYPICAL CHEST PAIN: ICD-10-CM

## 2021-05-12 DIAGNOSIS — R00.2 PALPITATIONS: ICD-10-CM

## 2021-05-12 DIAGNOSIS — I26.99 OTHER PULMONARY EMBOLISM WITHOUT ACUTE COR PULMONALE, UNSPECIFIED CHRONICITY: ICD-10-CM

## 2021-05-12 DIAGNOSIS — I05.9 MITRAL VALVE DISORDER: ICD-10-CM

## 2021-05-12 DIAGNOSIS — R94.31 QT PROLONGATION: ICD-10-CM

## 2021-05-12 PROCEDURE — 93000 ELECTROCARDIOGRAM COMPLETE: CPT | Mod: S$GLB,,, | Performed by: SPECIALIST

## 2021-05-12 PROCEDURE — 99214 PR OFFICE/OUTPT VISIT, EST, LEVL IV, 30-39 MIN: ICD-10-PCS | Mod: S$GLB,,, | Performed by: NURSE PRACTITIONER

## 2021-05-12 PROCEDURE — 99214 OFFICE O/P EST MOD 30 MIN: CPT | Mod: S$GLB,,, | Performed by: NURSE PRACTITIONER

## 2021-05-12 PROCEDURE — 93000 EKG 12-LEAD: ICD-10-PCS | Mod: S$GLB,,, | Performed by: SPECIALIST

## 2021-05-12 RX ORDER — DILTIAZEM HYDROCHLORIDE 30 MG/1
30 TABLET, FILM COATED ORAL EVERY 12 HOURS
Qty: 60 TABLET | Refills: 11 | Status: SHIPPED | OUTPATIENT
Start: 2021-05-12 | End: 2021-10-06

## 2021-05-27 ENCOUNTER — HOSPITAL ENCOUNTER (OUTPATIENT)
Dept: CARDIOLOGY | Facility: CLINIC | Age: 41
Discharge: HOME | End: 2021-05-27
Attending: NURSE PRACTITIONER
Payer: MEDICAID

## 2021-05-27 VITALS — BODY MASS INDEX: 18.27 KG/M2 | WEIGHT: 107 LBS | HEIGHT: 64 IN

## 2021-05-27 DIAGNOSIS — R00.2 PALPITATIONS: ICD-10-CM

## 2021-05-27 DIAGNOSIS — I34.1 MVP (MITRAL VALVE PROLAPSE): ICD-10-CM

## 2021-05-27 DIAGNOSIS — R94.31 QT PROLONGATION: ICD-10-CM

## 2021-05-27 PROCEDURE — 93015 CV STRESS TEST SUPVJ I&R: CPT | Mod: S$GLB,,, | Performed by: INTERNAL MEDICINE

## 2021-05-27 PROCEDURE — 93015 EXERCISE STRESS - EKG (CUPID ONLY): ICD-10-PCS | Mod: S$GLB,,, | Performed by: INTERNAL MEDICINE

## 2021-05-27 PROCEDURE — 93224 XTRNL ECG REC UP TO 48 HRS: CPT | Mod: S$GLB,,, | Performed by: SPECIALIST

## 2021-05-27 PROCEDURE — 93306 TTE W/DOPPLER COMPLETE: CPT | Mod: S$GLB,,, | Performed by: INTERNAL MEDICINE

## 2021-05-27 PROCEDURE — 93306 ECHO (CUPID ONLY): ICD-10-PCS | Mod: S$GLB,,, | Performed by: INTERNAL MEDICINE

## 2021-05-27 PROCEDURE — 93224 HOLTER MONITOR - 48 HOUR (CUPID ONLY): ICD-10-PCS | Mod: S$GLB,,, | Performed by: SPECIALIST

## 2021-06-02 LAB
CV STRESS BASE HR: 65 BPM
DIASTOLIC BLOOD PRESSURE: 58 MMHG
OHS CV CPX 1 MINUTE RECOVERY HEART RATE: 141 BPM
OHS CV CPX 85 PERCENT MAX PREDICTED HEART RATE MALE: 145
OHS CV CPX ESTIMATED METS: 10
OHS CV CPX MAX PREDICTED HEART RATE: 171
OHS CV CPX PATIENT IS FEMALE: 1
OHS CV CPX PATIENT IS MALE: 0
OHS CV CPX PEAK DIASTOLIC BLOOD PRESSURE: 82 MMHG
OHS CV CPX PEAK HEAR RATE: 155 BPM
OHS CV CPX PEAK RATE PRESSURE PRODUCT: NORMAL
OHS CV CPX PEAK SYSTOLIC BLOOD PRESSURE: 130 MMHG
OHS CV CPX PERCENT MAX PREDICTED HEART RATE ACHIEVED: 91
OHS CV CPX RATE PRESSURE PRODUCT PRESENTING: 6240
STRESS ECHO POST EXERCISE DUR MIN: 7 MINUTES
STRESS ECHO POST EXERCISE DUR SEC: 0 SECONDS
SYSTOLIC BLOOD PRESSURE: 96 MMHG

## 2021-06-04 ENCOUNTER — PATIENT MESSAGE (OUTPATIENT)
Dept: CARDIOLOGY | Facility: CLINIC | Age: 41
End: 2021-06-04

## 2021-06-09 ENCOUNTER — OFFICE VISIT (OUTPATIENT)
Dept: CARDIOLOGY | Facility: CLINIC | Age: 41
End: 2021-06-09
Payer: MEDICAID

## 2021-06-09 VITALS
DIASTOLIC BLOOD PRESSURE: 64 MMHG | WEIGHT: 107.81 LBS | HEART RATE: 59 BPM | BODY MASS INDEX: 18.5 KG/M2 | OXYGEN SATURATION: 93 % | SYSTOLIC BLOOD PRESSURE: 102 MMHG

## 2021-06-09 DIAGNOSIS — R00.2 PALPITATIONS: ICD-10-CM

## 2021-06-09 DIAGNOSIS — I35.8 OTHER NONRHEUMATIC AORTIC VALVE DISORDERS: ICD-10-CM

## 2021-06-09 DIAGNOSIS — I77.810 DILATED AORTIC ROOT: Primary | ICD-10-CM

## 2021-06-09 DIAGNOSIS — D68.59 ANTITHROMBIN III DEFICIENCY: ICD-10-CM

## 2021-06-09 DIAGNOSIS — I05.9 MITRAL VALVE DISORDER: ICD-10-CM

## 2021-06-09 DIAGNOSIS — I77.810 AORTIC ROOT DILATION: ICD-10-CM

## 2021-06-09 LAB
AORTIC ROOT ANNULUS: 3.4 CM
AORTIC VALVE CUSP SEPERATION: 1.8 CM
AV INDEX (PROSTH): 1.07
AV MEAN GRADIENT: 2 MMHG
AV PEAK GRADIENT: 5 MMHG
AV VALVE AREA: 2.43 CM2
AV VELOCITY RATIO: 0.95
BSA FOR ECHO PROCEDURE: 1.48 M2
CV ECHO LV RWT: 0.34 CM
DOP CALC AO PEAK VEL: 1.09 M/S
DOP CALC AO VTI: 26.1 CM
DOP CALC LVOT AREA: 2.3 CM2
DOP CALC LVOT DIAMETER: 1.7 CM
DOP CALC LVOT PEAK VEL: 1.04 M/S
DOP CALC LVOT STROKE VOLUME: 63.3 CM3
DOP CALCLVOT PEAK VEL VTI: 27.9 CM
E WAVE DECELERATION TIME: 174 MS
E/A RATIO: 1.91
E/E' RATIO: 9.62 M/S
ECHO LV POSTERIOR WALL: 0.66 CM (ref 0.6–1.1)
EJECTION FRACTION: 65 %
FRACTIONAL SHORTENING: 35 % (ref 28–44)
INTERVENTRICULAR SEPTUM: 0.86 CM (ref 0.6–1.1)
IVRT: 53 MS
LA MAJOR: 2.1 CM
LEFT ATRIUM SIZE: 3.4 CM
LEFT INTERNAL DIMENSION IN SYSTOLE: 2.56 CM (ref 2.1–4)
LEFT VENTRICLE MASS INDEX: 56 G/M2
LEFT VENTRICULAR INTERNAL DIMENSION IN DIASTOLE: 3.92 CM (ref 3.5–6)
LEFT VENTRICULAR MASS: 84.43 G
LV LATERAL E/E' RATIO: 6.73 M/S
LV SEPTAL E/E' RATIO: 16.83 M/S
MV PEAK A VEL: 0.53 M/S
MV PEAK E VEL: 1.01 M/S
PISA TR MAX VEL: 2 M/S
RA PRESSURE: 3 MMHG
RIGHT VENTRICULAR END-DIASTOLIC DIMENSION: 1.7 CM
TDI LATERAL: 0.15 M/S
TDI SEPTAL: 0.06 M/S
TDI: 0.11 M/S
TR MAX PG: 16 MMHG
TV REST PULMONARY ARTERY PRESSURE: 19 MMHG

## 2021-06-09 PROCEDURE — 99213 PR OFFICE/OUTPT VISIT, EST, LEVL III, 20-29 MIN: ICD-10-PCS | Mod: S$GLB,,, | Performed by: NURSE PRACTITIONER

## 2021-06-09 PROCEDURE — 99213 OFFICE O/P EST LOW 20 MIN: CPT | Mod: S$GLB,,, | Performed by: NURSE PRACTITIONER

## 2021-06-22 ENCOUNTER — PATIENT MESSAGE (OUTPATIENT)
Dept: HEMATOLOGY/ONCOLOGY | Facility: CLINIC | Age: 41
End: 2021-06-22

## 2021-06-23 ENCOUNTER — HOSPITAL ENCOUNTER (OUTPATIENT)
Dept: RADIOLOGY | Facility: HOSPITAL | Age: 41
Discharge: HOME | End: 2021-06-23
Attending: NURSE PRACTITIONER
Payer: MEDICAID

## 2021-06-23 ENCOUNTER — TELEPHONE (OUTPATIENT)
Dept: HEMATOLOGY/ONCOLOGY | Facility: CLINIC | Age: 41
End: 2021-06-23

## 2021-06-23 DIAGNOSIS — D50.8 OTHER IRON DEFICIENCY ANEMIA: ICD-10-CM

## 2021-06-23 DIAGNOSIS — E55.9 VITAMIN D DEFICIENCY: ICD-10-CM

## 2021-06-23 DIAGNOSIS — E53.8 B12 DEFICIENCY: ICD-10-CM

## 2021-06-23 DIAGNOSIS — I35.8 OTHER NONRHEUMATIC AORTIC VALVE DISORDERS: ICD-10-CM

## 2021-06-23 DIAGNOSIS — I77.810 DILATED AORTIC ROOT: ICD-10-CM

## 2021-06-23 PROCEDURE — 71275 CT ANGIOGRAPHY CHEST: CPT | Mod: TC

## 2021-06-23 PROCEDURE — 76775 US EXAM ABDO BACK WALL LIM: CPT | Mod: TC,PO

## 2021-06-23 PROCEDURE — 25500020 PHARM REV CODE 255: Performed by: NURSE PRACTITIONER

## 2021-06-23 RX ORDER — FERROUS SULFATE, DRIED 160(50) MG
1 TABLET, EXTENDED RELEASE ORAL 2 TIMES DAILY
Qty: 180 TABLET | Refills: 3 | Status: ON HOLD | OUTPATIENT
Start: 2021-06-23 | End: 2023-04-25

## 2021-06-23 RX ORDER — FERROUS SULFATE 325(65) MG
325 TABLET ORAL DAILY
Qty: 30 TABLET | Refills: 3 | Status: SHIPPED | OUTPATIENT
Start: 2021-06-23 | End: 2022-09-27 | Stop reason: CLARIF

## 2021-06-23 RX ADMIN — IOHEXOL 100 ML: 350 INJECTION, SOLUTION INTRAVENOUS at 10:06

## 2021-06-29 LAB
OHS CV EVENT MONITOR DAY: 0
OHS CV HOLTER LENGTH DECIMAL HOURS: 48
OHS CV HOLTER LENGTH HOURS: 48
OHS CV HOLTER LENGTH MINUTES: 0

## 2021-10-06 ENCOUNTER — OFFICE VISIT (OUTPATIENT)
Dept: CARDIOLOGY | Facility: CLINIC | Age: 41
End: 2021-10-06
Payer: MEDICAID

## 2021-10-06 VITALS
SYSTOLIC BLOOD PRESSURE: 130 MMHG | HEART RATE: 69 BPM | DIASTOLIC BLOOD PRESSURE: 72 MMHG | HEIGHT: 64 IN | BODY MASS INDEX: 18.61 KG/M2 | OXYGEN SATURATION: 100 % | WEIGHT: 109 LBS

## 2021-10-06 DIAGNOSIS — R07.89 ATYPICAL CHEST PAIN: ICD-10-CM

## 2021-10-06 DIAGNOSIS — R00.2 PALPITATIONS: ICD-10-CM

## 2021-10-06 DIAGNOSIS — R94.31 PROLONGED Q-T INTERVAL ON ECG: ICD-10-CM

## 2021-10-06 DIAGNOSIS — R07.9 CHEST PAIN, UNSPECIFIED TYPE: Primary | ICD-10-CM

## 2021-10-06 DIAGNOSIS — D68.59 ANTITHROMBIN III DEFICIENCY: ICD-10-CM

## 2021-10-06 PROCEDURE — 93000 ELECTROCARDIOGRAM COMPLETE: CPT | Mod: S$GLB,,, | Performed by: INTERNAL MEDICINE

## 2021-10-06 PROCEDURE — 93000 EKG 12-LEAD: ICD-10-PCS | Mod: S$GLB,,, | Performed by: INTERNAL MEDICINE

## 2021-10-06 PROCEDURE — 99213 PR OFFICE/OUTPT VISIT, EST, LEVL III, 20-29 MIN: ICD-10-PCS | Mod: S$GLB,,, | Performed by: NURSE PRACTITIONER

## 2021-10-06 PROCEDURE — 99213 OFFICE O/P EST LOW 20 MIN: CPT | Mod: S$GLB,,, | Performed by: NURSE PRACTITIONER

## 2021-10-06 RX ORDER — DILTIAZEM HYDROCHLORIDE EXTENDED-RELEASE TABLETS 120 MG/1
120 TABLET, EXTENDED RELEASE ORAL DAILY
Qty: 30 TABLET | Refills: 3 | Status: ON HOLD | OUTPATIENT
Start: 2021-10-06 | End: 2021-12-26 | Stop reason: SDUPTHER

## 2021-10-06 RX ORDER — SERTRALINE HYDROCHLORIDE 50 MG/1
50 TABLET, FILM COATED ORAL DAILY
COMMUNITY
End: 2022-09-27 | Stop reason: CLARIF

## 2021-10-26 ENCOUNTER — OFFICE VISIT (OUTPATIENT)
Dept: HEMATOLOGY/ONCOLOGY | Facility: CLINIC | Age: 41
End: 2021-10-26
Payer: MEDICAID

## 2021-10-26 VITALS
DIASTOLIC BLOOD PRESSURE: 75 MMHG | HEART RATE: 76 BPM | WEIGHT: 112 LBS | RESPIRATION RATE: 18 BRPM | HEIGHT: 64 IN | BODY MASS INDEX: 19.12 KG/M2 | SYSTOLIC BLOOD PRESSURE: 108 MMHG

## 2021-10-26 DIAGNOSIS — E53.8 B12 DEFICIENCY: ICD-10-CM

## 2021-10-26 DIAGNOSIS — D68.59 ANTITHROMBIN III DEFICIENCY: ICD-10-CM

## 2021-10-26 DIAGNOSIS — I26.99 OTHER PULMONARY EMBOLISM WITHOUT ACUTE COR PULMONALE, UNSPECIFIED CHRONICITY: ICD-10-CM

## 2021-10-26 DIAGNOSIS — Z15.89 MTHFR MUTATION: ICD-10-CM

## 2021-10-26 DIAGNOSIS — D50.8 OTHER IRON DEFICIENCY ANEMIA: Primary | ICD-10-CM

## 2021-10-26 PROCEDURE — 99213 OFFICE O/P EST LOW 20 MIN: CPT | Mod: S$GLB,,, | Performed by: INTERNAL MEDICINE

## 2021-10-26 PROCEDURE — 99213 PR OFFICE/OUTPT VISIT, EST, LEVL III, 20-29 MIN: ICD-10-PCS | Mod: S$GLB,,, | Performed by: INTERNAL MEDICINE

## 2021-10-26 RX ORDER — TRAZODONE HYDROCHLORIDE 150 MG/1
300 TABLET ORAL NIGHTLY
COMMUNITY
Start: 2021-10-04

## 2021-10-26 RX ORDER — NORETHINDRONE ACETATE AND ETHINYL ESTRADIOL AND FERROUS FUMARATE 1MG-20(21)
1 KIT ORAL NIGHTLY
Status: ON HOLD | COMMUNITY
Start: 2021-09-21 | End: 2023-04-25

## 2021-12-15 PROBLEM — Z93.4: Status: ACTIVE | Noted: 2021-12-15

## 2021-12-15 PROBLEM — R10.2 CHRONIC PELVIC PAIN IN FEMALE: Status: ACTIVE | Noted: 2021-12-15

## 2021-12-15 PROBLEM — G89.29 CHRONIC PELVIC PAIN IN FEMALE: Status: ACTIVE | Noted: 2021-12-15

## 2021-12-15 PROBLEM — K66.0 ABDOMINAL ADHESIONS: Status: ACTIVE | Noted: 2021-12-15

## 2021-12-15 PROBLEM — N73.6 PELVIC ADHESIONS: Status: ACTIVE | Noted: 2021-12-15

## 2021-12-24 PROBLEM — K56.609 SBO (SMALL BOWEL OBSTRUCTION): Status: ACTIVE | Noted: 2021-12-24

## 2022-02-24 NOTE — PROGRESS NOTES
[facility] Hematology/Oncology  PROGRESS NOTE      Subjective:       Patient ID:   NAME: Janeth Siddiqi : 1980     42 y.o. female    Referring Doc: Lizy Diamond  Other Physicians: Jere Noriega; Shazia; Az    Chief Complaint:  Anemia/b12 f/u    History of Present Illness:     Patient returns today for a regularly scheduled follow-up visit.  The patient is here with her mom. She is on eliquis. No CP, SOB, HA's or N/V.     She had surgery on right ovary in mid-Dec 2021; she has had some abdominal discomfort and occasional nausea since that time. Dr Willis did the surgery. They suspect she is having a lot of scar tissue issues. She has been on antibiotics.       She is seeing Dr Greenfield with GI and is seeing him again in 2 weeks.       Discussed covid 19 precautions      ROS:   GEN: normal without any fever, night sweats or weight loss  HEENT: normal with no HA's, sore throat, stiff neck, changes in vision  CV: normal with no CP, SOB, PND, KRAUSE or orthopnea  PULM: normal with no SOB, cough, hemoptysis, sputum or pleuritic pain  GI: intermittent post-op abdominal discomfort   : normal with no hematuria, dysuria  BREAST: normal with no mass, discharge, pain  SKIN: normal with no rash, erythema, bruising, or swelling    Allergies:  Review of patient's allergies indicates:   Allergen Reactions    Iron analogues Anaphylaxis     Infusion only. Tolerates po    Bactrim [sulfamethoxazole-trimethoprim] Swelling    Lidocaine Swelling    Nsaids (non-steroidal anti-inflammatory drug)      cannot take NSAIDs- upsets the stomach    Zofran [ondansetron hcl (pf)] Nausea Only       Medications:    Current Outpatient Medications:     calcium-vitamin D3 (OYSTER SHELL CALCIUM-VIT D3) 500 mg(1,250mg) -200 unit per tablet, Take 1 tablet by mouth 2 (two) times daily., Disp: 180 tablet, Rfl: 3    dicyclomine (BENTYL) 10 MG capsule, Take 10 mg by mouth 4 (four) times daily as needed., Disp: , Rfl:      diltiaZEM (CARDIZEM LA) 120 mg 24 hr tablet, Take 1 tablet (120 mg total) by mouth 2 (two) times a day., Disp: , Rfl:     ELIQUIS 5 mg Tab, TAKE 1 TABLET BY MOUTH TWICE DAILY (Patient taking differently: Take 5 mg by mouth 2 (two) times daily.), Disp: 60 tablet, Rfl: 6    ergocalciferol (ERGOCALCIFEROL) 50,000 unit Cap, 1 BY MOUTH EVERY 7 DAYS, Disp: 4 capsule, Rfl: 6    famotidine (PEPCID) 20 MG tablet, TAKE 1 TABLET BY MOUTH TWICE DAILY AS NEEDED FOR BREAKTHROUGH STOMACH PAIN AND/OR REFLUX, Disp: , Rfl:     ferrous sulfate (FEOSOL) 325 mg (65 mg iron) Tab tablet, Take 1 tablet (325 mg total) by mouth once daily., Disp: 30 tablet, Rfl: 3    fluconazole (DIFLUCAN) 150 MG Tab, Take 150 mg by mouth once daily., Disp: , Rfl:     gabapentin (NEURONTIN) 300 MG capsule, TAKE 2 CAPSULES BY MOUTH AT NIGHT, Disp: , Rfl:     HYDROcodone-acetaminophen (NORCO) 5-325 mg per tablet, Take 1 tablet by mouth., Disp: , Rfl:     hydrOXYzine (ATARAX) 50 MG tablet, TAKE 1 TABLET BY MOUTH 3 TO 4 TIMES DAILY, Disp: , Rfl:     ibuprofen (ADVIL,MOTRIN) 600 MG tablet, Take 1 tablet (600 mg total) by mouth every 6 (six) hours., Disp: 40 tablet, Rfl: 2    ibuprofen (ADVIL,MOTRIN) 800 MG tablet, Take 1 tablet (800 mg total) by mouth 3 (three) times daily., Disp: 40 tablet, Rfl: 2    JUNEL FE 1/20, 28, 1 mg-20 mcg (21)/75 mg (7) per tablet, Take 1 tablet by mouth nightly., Disp: , Rfl:     LINZESS 290 mcg Cap, Take 290 mcg by mouth once daily. , Disp: , Rfl: 1    multivitamin capsule, Take 1 capsule by mouth nightly., Disp: , Rfl:     oxyCODONE-acetaminophen (PERCOCET) 5-325 mg per tablet, Take 1 tablet by mouth every 4 (four) hours as needed for Pain., Disp: 20 tablet, Rfl: 0    oxyCODONE-acetaminophen (PERCOCET) 5-325 mg per tablet, Take 1 tablet by mouth every 4 (four) hours as needed for Pain., Disp: 25 tablet, Rfl: 0    pantoprazole (PROTONIX) 40 MG tablet, Take 40 mg by mouth once daily., Disp: , Rfl:     polyethylene  "glycol (GOLYTELY,NULYTELY) 236-22.74-6.74 -5.86 gram suspension, Take by mouth., Disp: , Rfl:     sertraline (ZOLOFT) 25 MG tablet, Take 25 mg by mouth once daily., Disp: , Rfl:     sertraline (ZOLOFT) 50 MG tablet, Take 50 mg by mouth once daily., Disp: , Rfl:     SUPREP BOWEL PREP KIT 17.5-3.13-1.6 gram SolR, MIX AND DRINK AS DIRECTED, Disp: , Rfl:     topiramate (TOPAMAX) 200 MG Tab, Take 200 mg by mouth once daily., Disp: , Rfl:     traMADoL (ULTRAM) 50 mg tablet, Take 50 mg by mouth every 6 (six) hours as needed., Disp: , Rfl:     traZODone (DESYREL) 150 MG tablet, Take 300 mg by mouth nightly., Disp: , Rfl:     tretinoin (RETIN-A) 0.1 % cream, Apply topically., Disp: , Rfl:     valACYclovir (VALTREX) 500 MG tablet, Take 500 mg by mouth 2 (two) times daily., Disp: , Rfl:     PMHx/PSHx Updates:  See patient's last visit with me on 10/26/2021  See H&P on 12/7/16        Pathology:  none          Objective:     Vitals:  Blood pressure 98/61, pulse 85, temperature 97.6 °F (36.4 °C), resp. rate 18, height 5' 4" (1.626 m), weight 47.6 kg (105 lb).    Physical Examination:   GEN: no apparent distress, comfortable; AAOx3  HEAD: atraumatic and normocephalic  EYES: no pallor, no icterus, PERRLA  ENT: OMM, no pharyngeal erythema, external ears WNL; no nasal discharge; no thrush  NECK: no masses, thyroid normal, trachea midline, no LAD/LN's, supple  CV: RRR with no murmur; normal pulse; normal S1 and S2; no pedal edema  CHEST: Normal respiratory effort; CTAB; normal breath sounds; no wheeze or crackles  ABDOM:  nondistended; soft; normal bowel sounds; no rebound/guarding;   MUSC/Skeletal: ROM normal; no crepitus; joints normal; no deformities or arthropathy  EXTREM: no clubbing, cyanosis, inflammation or swelling  SKIN: no rashes, lesions, ulcers, petechiae or subcutaneous nodules  : no reza  NEURO: grossly intact; motor/sensory WNL; AAOx3; no tremors  PSYCH: normal mood, affect and behavior  LYMPH: normal " cervical, supraclavicular, axillary and groin LN's            Labs:               Lab Results   Component Value Date    WBC 3.9 02/25/2022    HGB 13.2 02/25/2022    HCT 40.3 02/25/2022    MCV 90.4 02/25/2022     02/25/2022     BMP  Lab Results   Component Value Date     02/25/2022    K 4.1 02/25/2022     02/25/2022    CO2 28 02/25/2022    BUN 10 02/25/2022    CREATININE 0.75 02/25/2022    CALCIUM 8.9 02/25/2022    ANIONGAP 12 01/19/2022    ESTGFRAFRICA 115 02/25/2022    EGFRNONAA 99 02/25/2022      Lab Results   Component Value Date    IRON 147 02/25/2022    TIBC 252 02/25/2022    FERRITIN 63 02/25/2022         Radiology/Diagnostic Studies:    X-ray Abdomen Flat And Erect    Result Date: 7/21/2017  EXAM: KUB. INDICATION: Obstruction. COMPARISON: KUB 1/1/14. FINDINGS: Supine and upright radiographs of the abdomen demonstrate a nonobstructive bowel gas pattern. There is no free air. There is no pneumatosis. There is no portal venous gas. There is calcification. Lung bases are clear. There is no osseous abnormality.    1.Nonobstructed bowel gas pattern. Electronically signed by: KELBY DENNIS MD Date:     07/21/17 Time:    13:49     Ct Abdomen Pelvis With Contrast    Result Date: 7/21/2017  Procedure: CT of the abdomen and pelvis with IV contrast. Technique: Axial images of the abdomen and pelvis were obtained with intravenous contrast administration. Coronal and sagittal reconstructions were provided. Total DLP was 691 mGy-cm.  Dose lowering technique, automated exposure control, was utilized for this exam. History: Left-sided abdominal pain and vomiting. Comparison: CT of the abdomen and pelvis 7/31/2015. Findings: The bilateral lung bases are clear. The heart is normal in size. There is a 7 mm focus of arterial enhancement within segment 4B of the liver which is isodense to the adjacent liver on delayed phase. This most consistent with a flash filling hemangioma. The portal vein is patent. The spleen,  pancreas, and adrenal glands are normal. Bilateral kidneys are normal. There is no hydronephrosis or nephrolithiasis. The stomach and small bowel are decompressed. The appendix is not visualized, however there is no right lower quadrant inflammatory stranding. The colon is normal. The uterus and adnexa are normal for age. Urinary bladder is normal. There is trace physiologic free fluid in the lower pelvis. There is no pelvic or retroperitoneal adenopathy. The abdominal aorta is non-aneurysmal. There is no lytic or blastic osseous lesions.     No acute abnormality of the abdomen and pelvis. Electronically signed by: KELBY DENNIS MD Date:     07/21/17 Time:    15:37       I have reviewed all available lab results and radiology reports.    Assessment/Plan:   (1) 42 y.o. female with diagnosis of iron deficiency anemia and B12 deficiency  - she was previously on oral iron   - latest ferritin is 124 and much better  - B12 adeqaute  - hgb is 14.0 and WNL and stable    2/28/2022:  - latest hgb adequate at 13.2    (2) prior Pulmonary emboli with prior use of OCP's  - on oral anticoagulant drug Eliquis  - underlying MTHFR-A heterozygous and ATIII deficiency issues  - followed by Dr Mccray with Pulm and he ordered several tests and  workup for cystic fibrosis which was negative    2/28/2022:  - continued on eliquis  - no excessive bleeding or brusiing    (3) Crohn's-like disorder/pancreatitis - followed by Dr Noriega with GI in past and now Dr Kelby Sinclair with GI at [facility]    (4) Chronic GYN issues with prior bleeding (resolved) - followed by Dr Gigi Oquendo with GYN    2/28/2022:  - She had surgery on right ovary in mid-Dec 2021; she has had some abdominal discomfort and occasional nausea since that time. Dr Willis did the surgery. They suspect she is having a lot of scar tissue issues. She has been on antibiotics.  - She is seeing Dr Greenfield with GI and is seeing him again in 2 wees.       (5) prior Vit D  deficiency    (6) She saw Dr Khoobehi with plastics in [place] and had a Voluma skin filler without any difficulties     (7) SVT - seeing Dr Bernardo  and now on medications      1. Other iron deficiency anemia     2. Antithrombin III deficiency     3. Other pulmonary embolism without acute cor pulmonale, unspecified chronicity     4. B12 deficiency     5. MTHFR mutation         PLAN:  1. Check labs every 3 months ; continue eliquis  2. F/u with PCP , GYN, and pulm  3. F/u with Dr Greenfield with [facility] in 2  weeks  4. F/u with GYN for persistent post-op oophorectomy issues with Dr Oquendo      RTC  in 3 months    Fax note to Jere Mccray, Khoobehi, Lizy Diamond, Yahir; Az    Discussion:       COVID-19 Discussion:    I had long discussion with patient and any applicable family about the COVID-19 coronavirus epidemic and the recommended precautions with regard to cancer and/or hematology patients. I have re-iterated the CDC recommendations for adequate hand washing, use of hand -like products, and coughing into elbow, etc. In addition, especially for our patients who are on chemotherapy and/or our otherwise immunocompromised patients, I have recommended avoidance of crowds, including movie theaters, restaurants, churches, etc. I have recommended avoidance of any sick or symptomatic family members and/or friends. I have also recommended avoidance of any raw and unwashed food products, and general avoidance of food items that have not been prepared by themselves. The patient has been asked to call us immediately with any symptom developments, issues, questions or other general concerns.     I have explained all of the above in detail and the patient understands all of the current recommendation(s). I have answered all of their questions to the best of my ability and to their complete satisfaction.   The patient is to continue with the current management plan.            Electronically signed by  Miguel Hurst MD

## 2022-02-28 ENCOUNTER — OFFICE VISIT (OUTPATIENT)
Dept: HEMATOLOGY/ONCOLOGY | Facility: CLINIC | Age: 42
End: 2022-02-28
Payer: MEDICAID

## 2022-02-28 VITALS
HEIGHT: 64 IN | SYSTOLIC BLOOD PRESSURE: 98 MMHG | RESPIRATION RATE: 18 BRPM | BODY MASS INDEX: 17.93 KG/M2 | WEIGHT: 105 LBS | HEART RATE: 85 BPM | DIASTOLIC BLOOD PRESSURE: 61 MMHG | TEMPERATURE: 98 F

## 2022-02-28 DIAGNOSIS — E53.8 B12 DEFICIENCY: ICD-10-CM

## 2022-02-28 DIAGNOSIS — I26.99 OTHER PULMONARY EMBOLISM WITHOUT ACUTE COR PULMONALE, UNSPECIFIED CHRONICITY: ICD-10-CM

## 2022-02-28 DIAGNOSIS — Z15.89 MTHFR MUTATION: ICD-10-CM

## 2022-02-28 DIAGNOSIS — D50.8 OTHER IRON DEFICIENCY ANEMIA: Primary | ICD-10-CM

## 2022-02-28 DIAGNOSIS — D68.59 ANTITHROMBIN III DEFICIENCY: ICD-10-CM

## 2022-02-28 PROCEDURE — 3074F SYST BP LT 130 MM HG: CPT | Mod: S$GLB,,, | Performed by: INTERNAL MEDICINE

## 2022-02-28 PROCEDURE — 3078F PR MOST RECENT DIASTOLIC BLOOD PRESSURE < 80 MM HG: ICD-10-PCS | Mod: S$GLB,,, | Performed by: INTERNAL MEDICINE

## 2022-02-28 PROCEDURE — 1160F RVW MEDS BY RX/DR IN RCRD: CPT | Mod: S$GLB,,, | Performed by: INTERNAL MEDICINE

## 2022-02-28 PROCEDURE — 99212 OFFICE O/P EST SF 10 MIN: CPT | Mod: S$GLB,,, | Performed by: INTERNAL MEDICINE

## 2022-02-28 PROCEDURE — 1160F PR REVIEW ALL MEDS BY PRESCRIBER/CLIN PHARMACIST DOCUMENTED: ICD-10-PCS | Mod: S$GLB,,, | Performed by: INTERNAL MEDICINE

## 2022-02-28 PROCEDURE — 3008F PR BODY MASS INDEX (BMI) DOCUMENTED: ICD-10-PCS | Mod: S$GLB,,, | Performed by: INTERNAL MEDICINE

## 2022-02-28 PROCEDURE — 99212 PR OFFICE/OUTPT VISIT, EST, LEVL II, 10-19 MIN: ICD-10-PCS | Mod: S$GLB,,, | Performed by: INTERNAL MEDICINE

## 2022-02-28 PROCEDURE — 3078F DIAST BP <80 MM HG: CPT | Mod: S$GLB,,, | Performed by: INTERNAL MEDICINE

## 2022-02-28 PROCEDURE — 3074F PR MOST RECENT SYSTOLIC BLOOD PRESSURE < 130 MM HG: ICD-10-PCS | Mod: S$GLB,,, | Performed by: INTERNAL MEDICINE

## 2022-02-28 PROCEDURE — 3008F BODY MASS INDEX DOCD: CPT | Mod: S$GLB,,, | Performed by: INTERNAL MEDICINE

## 2022-03-15 ENCOUNTER — OFFICE VISIT (OUTPATIENT)
Dept: CARDIOLOGY | Facility: CLINIC | Age: 42
End: 2022-03-15
Payer: MEDICAID

## 2022-03-15 VITALS
SYSTOLIC BLOOD PRESSURE: 118 MMHG | HEIGHT: 64 IN | OXYGEN SATURATION: 100 % | WEIGHT: 108 LBS | DIASTOLIC BLOOD PRESSURE: 80 MMHG | BODY MASS INDEX: 18.44 KG/M2 | HEART RATE: 85 BPM

## 2022-03-15 DIAGNOSIS — I77.810 DILATED AORTIC ROOT: ICD-10-CM

## 2022-03-15 DIAGNOSIS — R07.89 CHEST HEAVINESS: ICD-10-CM

## 2022-03-15 DIAGNOSIS — R06.02 SHORTNESS OF BREATH: Primary | ICD-10-CM

## 2022-03-15 DIAGNOSIS — R06.09 DOE (DYSPNEA ON EXERTION): ICD-10-CM

## 2022-03-15 DIAGNOSIS — R94.31 NONSPECIFIC ABNORMAL ELECTROCARDIOGRAM (ECG) (EKG): Primary | ICD-10-CM

## 2022-03-15 DIAGNOSIS — R94.31 ABNORMAL ELECTROCARDIOGRAM (ECG) (EKG): ICD-10-CM

## 2022-03-15 PROCEDURE — 3074F SYST BP LT 130 MM HG: CPT | Mod: CPTII,S$GLB,, | Performed by: NURSE PRACTITIONER

## 2022-03-15 PROCEDURE — 1159F PR MEDICATION LIST DOCUMENTED IN MEDICAL RECORD: ICD-10-PCS | Mod: CPTII,S$GLB,, | Performed by: NURSE PRACTITIONER

## 2022-03-15 PROCEDURE — 93000 EKG 12-LEAD: ICD-10-PCS | Mod: S$GLB,,, | Performed by: INTERNAL MEDICINE

## 2022-03-15 PROCEDURE — 3074F PR MOST RECENT SYSTOLIC BLOOD PRESSURE < 130 MM HG: ICD-10-PCS | Mod: CPTII,S$GLB,, | Performed by: NURSE PRACTITIONER

## 2022-03-15 PROCEDURE — 99214 PR OFFICE/OUTPT VISIT, EST, LEVL IV, 30-39 MIN: ICD-10-PCS | Mod: S$GLB,,, | Performed by: NURSE PRACTITIONER

## 2022-03-15 PROCEDURE — 93000 ELECTROCARDIOGRAM COMPLETE: CPT | Mod: S$GLB,,, | Performed by: INTERNAL MEDICINE

## 2022-03-15 PROCEDURE — 1159F MED LIST DOCD IN RCRD: CPT | Mod: CPTII,S$GLB,, | Performed by: NURSE PRACTITIONER

## 2022-03-15 PROCEDURE — 3008F BODY MASS INDEX DOCD: CPT | Mod: CPTII,S$GLB,, | Performed by: NURSE PRACTITIONER

## 2022-03-15 PROCEDURE — 3079F PR MOST RECENT DIASTOLIC BLOOD PRESSURE 80-89 MM HG: ICD-10-PCS | Mod: CPTII,S$GLB,, | Performed by: NURSE PRACTITIONER

## 2022-03-15 PROCEDURE — 99214 OFFICE O/P EST MOD 30 MIN: CPT | Mod: S$GLB,,, | Performed by: NURSE PRACTITIONER

## 2022-03-15 PROCEDURE — 3008F PR BODY MASS INDEX (BMI) DOCUMENTED: ICD-10-PCS | Mod: CPTII,S$GLB,, | Performed by: NURSE PRACTITIONER

## 2022-03-15 PROCEDURE — 3079F DIAST BP 80-89 MM HG: CPT | Mod: CPTII,S$GLB,, | Performed by: NURSE PRACTITIONER

## 2022-03-15 NOTE — PROGRESS NOTES
Subjective:    Patient ID:  Janeth Siddiqi is a 42 y.o. female  Chief Complaint   Patient presents with    Hospital Follow Up    Chest Pain    Dizziness       HPI:  Patient presents today for emergency room follow-up.  She was seen in the emergency room and was found to have an abnormal EKG with concerns of defect in the anterior wall.  She continues to have shortness of breath with exertion and also has intermittent episodes of chest heaviness.    Review of patient's allergies indicates:   Allergen Reactions    Iron analogues Anaphylaxis     Infusion only. 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    Anxiety     B12 deficiency 8/15/2017    Bipolar disorder     Constipation     Eating disorder     Encounter for blood transfusion     Gastroparesis     MTHFR mutation 8/15/2017    Pancreatitis     Pulmonary embolism     Seizures     AS A CHILD    Vitamin D deficiency 8/15/2017     Past Surgical History:   Procedure Laterality Date    APPENDECTOMY      bowel obstruction      x 2    LAPAROSCOPIC SALPINGO-OOPHORECTOMY Right 12/15/2021    Procedure: SALPINGO-OOPHORECTOMY, LYSIS OF ADHESIONS ;  Surgeon: Gigi Oquendo MD;  Location: [facility];  Service: OB/GYN;  Laterality: Right;    LAPAROSCOPY N/A 12/15/2021    Procedure: LAPAROSCOPY;  Surgeon: Gigi Oquendo MD;  Location: [facility];  Service: OB/GYN;  Laterality: N/A;    lysis of adhesions      OVARIAN CYST REMOVAL      uterine polyp removal       Social History     Tobacco Use    Smoking status: Never Smoker    Smokeless tobacco: Never Used   Substance Use Topics    Alcohol use: No    Drug use: No     Family History   Problem Relation Age of Onset    Breast cancer Mother 58    Colon cancer Maternal Grandmother 62    Prostate cancer Maternal  Grandfather     Breast cancer Father         Review of Systems:   Constitution: Negative for diaphoresis and fever.   HEENT: Negative for nosebleeds.    Cardiovascular: + for chest pain      + dyspnea on exertion       No leg swelling        No palpitations  Respiratory: Negative for shortness of breath and wheezing.    Hematologic/Lymphatic: Negative for bleeding problem. Does not bruise/bleed easily.   Skin: Negative for color change and rash.   Musculoskeletal: Negative for falls and myalgias.   Gastrointestinal: Negative for hematemesis and hematochezia.   Genitourinary: Negative for hematuria.   Neurological: Negative for dizziness and light-headedness.   Psychiatric/Behavioral: Negative for altered mental status and memory loss.          Objective:        Vitals:    03/15/22 1423   BP: 118/80   Pulse: 85       Lab Results   Component Value Date    WBC 3.9 02/25/2022    HGB 13.2 02/25/2022    HCT 40.3 02/25/2022     02/25/2022    ALT 10 02/25/2022    AST 13 02/25/2022     02/25/2022    K 4.1 02/25/2022     02/25/2022    CREATININE 0.75 02/25/2022    BUN 10 02/25/2022    CO2 28 02/25/2022    TSH 2.240 01/01/2021    INR 1.3 07/21/2020        ECHOCARDIOGRAM RESULTS  Results for orders placed during the hospital encounter of 05/27/21    Echo Color Flow Doppler? Yes    Interpretation Summary  · The left ventricle is normal in size with normal systolic function.  · Normal left ventricular diastolic function.  · The estimated ejection fraction is 65%.  · Normal right ventricular size with normal right ventricular systolic function.  · Normal central venous pressure (3 mmHg).  · The estimated PA systolic pressure is 19 mmHg.  · The aortic root is mildly dilated.        CURRENT/PREVIOUS VISIT EKG  Results for orders placed or performed during the hospital encounter of 12/15/21   EKG 12-lead    Collection Time: 12/17/21 10:00 AM    Narrative    Test Reason : R06.02,    Vent. Rate : 071 BPM     Atrial  Rate : 071 BPM     P-R Int : 162 ms          QRS Dur : 072 ms      QT Int : 434 ms       P-R-T Axes : 058 015 003 degrees     QTc Int : 471 ms    Normal sinus rhythm  Cannot rule out Anterior infarct ,age undetermined  Abnormal ECG  When compared with ECG of 06-OCT-2021 12:51,  Questionable change in The axis  T wave inversion now evident in Inferior leads  T wave inversion now evident in Anterior leads  Confirmed by Delfin Gonzalez MD ([id number]) on 12/17/2021 12:20:01 PM    Referred By: POOJA FRY           Confirmed By:Delfin Gonzalez MD     No valid procedures specified.   Results for orders placed during the hospital encounter of 05/27/21    Exercise Stress - EKG    Interpretation Summary    The EKG portion of this study is negative for ischemia.    The patient reported no chest pain during the stress test.    The blood pressure response to stress was normal.    There were no arrhythmias during stress.      Physical Exam:  CONSTITUTIONAL: No fever, no chills  HEENT: Normocephalic, atraumatic,pupils reactive to light                 NECK:  No JVD no carotid bruit  CVS: S1S2+, RRR  LUNGS: Clear  ABDOMEN: Soft, NT, BS+  EXTREMITIES: No cyanosis, edema  : No reza catheter  NEURO: AAO X 3  PSY: Normal affect      Medication List with Changes/Refills   Current Medications    CALCIUM-VITAMIN D3 (OYSTER SHELL CALCIUM-VIT D3) 500 MG(1,250MG) -200 UNIT PER TABLET    Take 1 tablet by mouth 2 (two) times daily.    DICYCLOMINE (BENTYL) 10 MG CAPSULE    Take 10 mg by mouth 4 (four) times daily as needed.    DILTIAZEM (CARDIZEM LA) 120 MG 24 HR TABLET    Take 1 tablet (120 mg total) by mouth 2 (two) times a day.    ELIQUIS 5 MG TAB    TAKE 1 TABLET BY MOUTH TWICE DAILY    ERGOCALCIFEROL (ERGOCALCIFEROL) 50,000 UNIT CAP    1 BY MOUTH EVERY 7 DAYS    FAMOTIDINE (PEPCID) 20 MG TABLET    TAKE 1 TABLET BY MOUTH TWICE DAILY AS NEEDED FOR BREAKTHROUGH STOMACH PAIN AND/OR REFLUX    FERROUS SULFATE (FEOSOL) 325 MG (65 MG IRON) TAB  TABLET    Take 1 tablet (325 mg total) by mouth once daily.    FLUCONAZOLE (DIFLUCAN) 150 MG TAB    Take 150 mg by mouth once daily.    GABAPENTIN (NEURONTIN) 300 MG CAPSULE    TAKE 2 CAPSULES BY MOUTH AT NIGHT    HYDROCODONE-ACETAMINOPHEN (NORCO) 5-325 MG PER TABLET    Take 1 tablet by mouth.    HYDROXYZINE (ATARAX) 50 MG TABLET    TAKE 1 TABLET BY MOUTH 3 TO 4 TIMES DAILY    IBUPROFEN (ADVIL,MOTRIN) 800 MG TABLET    Take 1 tablet (800 mg total) by mouth 3 (three) times daily.    JUNEL FE 1/20, 28, 1 MG-20 MCG (21)/75 MG (7) PER TABLET    Take 1 tablet by mouth nightly.    LINZESS 290 MCG CAP    Take 290 mcg by mouth once daily.     MULTIVITAMIN CAPSULE    Take 1 capsule by mouth nightly.    OXYCODONE-ACETAMINOPHEN (PERCOCET) 5-325 MG PER TABLET    Take 1 tablet by mouth every 4 (four) hours as needed for Pain.    PANTOPRAZOLE (PROTONIX) 40 MG TABLET    Take 40 mg by mouth once daily.    POLYETHYLENE GLYCOL (GOLYTELY,NULYTELY) 236-22.74-6.74 -5.86 GRAM SUSPENSION    Take by mouth.    SERTRALINE (ZOLOFT) 25 MG TABLET    Take 25 mg by mouth once daily.    SERTRALINE (ZOLOFT) 50 MG TABLET    Take 50 mg by mouth once daily.    TOPIRAMATE (TOPAMAX) 200 MG TAB    Take 200 mg by mouth once daily.    TRAMADOL (ULTRAM) 50 MG TABLET    Take 50 mg by mouth every 6 (six) hours as needed.    TRAZODONE (DESYREL) 150 MG TABLET    Take 300 mg by mouth nightly.    TRETINOIN (RETIN-A) 0.1 % CREAM    Apply topically.   Discontinued Medications    IBUPROFEN (ADVIL,MOTRIN) 600 MG TABLET    Take 1 tablet (600 mg total) by mouth every 6 (six) hours.    OXYCODONE-ACETAMINOPHEN (PERCOCET) 5-325 MG PER TABLET    Take 1 tablet by mouth every 4 (four) hours as needed for Pain.    SUPREP BOWEL PREP KIT 17.5-3.13-1.6 GRAM SOLR    MIX AND DRINK AS DIRECTED    VALACYCLOVIR (VALTREX) 500 MG TABLET    Take 500 mg by mouth 2 (two) times daily.             Assessment:       1. Nonspecific abnormal electrocardiogram (ECG) (EKG)    2. KRAUSE (dyspnea on  exertion)    3. Chest heaviness    4. Dilated aortic root         Plan:     1. Reviewed EKG from 12/21 ER visit.  She did have nonspecific ST and T-wave abnormalities.  EKG today does not show any significant changes.  2.  Would like to obtain a nuclear stress test to evaluate for reversible ischemia in light of abnormal EKG as well as shortness of breath with exertion and chest heaviness.  3. Would also like to obtain a 2D echocardiogram to evaluate LV function and valves.  Would also like to re-evaluate aortic root dilation.  4. Will see her back after testing is completed.  Problem List Items Addressed This Visit    None     Visit Diagnoses     Nonspecific abnormal electrocardiogram (ECG) (EKG)    -  Primary    Relevant Orders    IN OFFICE EKG 12-LEAD (to [place])    Echo    Nuclear Stress - Cardiology Interpreted    KRAUSE (dyspnea on exertion)        Relevant Orders    Echo    Nuclear Stress - Cardiology Interpreted    Chest heaviness        Relevant Orders    Echo    Nuclear Stress - Cardiology Interpreted    Dilated aortic root              Follow up in about 4 weeks (around 4/12/2022).

## 2022-04-22 ENCOUNTER — CLINICAL SUPPORT (OUTPATIENT)
Dept: CARDIOLOGY | Facility: HOSPITAL | Age: 42
End: 2022-04-22
Attending: INTERNAL MEDICINE
Payer: MEDICAID

## 2022-04-22 ENCOUNTER — CLINICAL SUPPORT (OUTPATIENT)
Dept: CARDIOLOGY | Facility: HOSPITAL | Age: 42
End: 2022-04-22
Attending: NURSE PRACTITIONER
Payer: MEDICAID

## 2022-04-22 ENCOUNTER — HOSPITAL ENCOUNTER (OUTPATIENT)
Dept: RADIOLOGY | Facility: HOSPITAL | Age: 42
Discharge: HOME | End: 2022-04-22
Attending: INTERNAL MEDICINE
Payer: MEDICAID

## 2022-04-22 DIAGNOSIS — R06.02 SHORTNESS OF BREATH: ICD-10-CM

## 2022-04-22 DIAGNOSIS — R94.31 NONSPECIFIC ABNORMAL ELECTROCARDIOGRAM (ECG) (EKG): ICD-10-CM

## 2022-04-22 DIAGNOSIS — R07.89 CHEST HEAVINESS: ICD-10-CM

## 2022-04-22 DIAGNOSIS — R94.31 ABNORMAL ELECTROCARDIOGRAM (ECG) (EKG): ICD-10-CM

## 2022-04-22 DIAGNOSIS — R06.09 DOE (DYSPNEA ON EXERTION): ICD-10-CM

## 2022-04-22 LAB
AORTIC ROOT ANNULUS: 3.46 CM
AV INDEX (PROSTH): 1.18
AV MEAN GRADIENT: 2 MMHG
AV VALVE AREA: 3.35 CM2
CV ECHO LV RWT: 0.59 CM
CV STRESS BASE HR: 78 BPM
DIASTOLIC BLOOD PRESSURE: 80 MMHG
DOP CALC AO VTI: 18.76 CM
DOP CALC LVOT AREA: 2.8 CM2
DOP CALC LVOT DIAMETER: 1.9 CM
DOP CALC LVOT PEAK VEL: 108.17 M/S
DOP CALC LVOT STROKE VOLUME: 62.8 CM3
DOP CALCLVOT PEAK VEL VTI: 22.16 CM
E WAVE DECELERATION TIME: 178.19 MSEC
E/A RATIO: 2.1
E/E' RATIO: 7.48 M/S
ECHO LV POSTERIOR WALL: 0.86 CM (ref 0.6–1.1)
EJECTION FRACTION: 60 %
FRACTIONAL SHORTENING: 29 % (ref 28–44)
INTERVENTRICULAR SEPTUM: 0.81 CM (ref 0.6–1.1)
LEFT ATRIUM SIZE: 2.38 CM
LEFT INTERNAL DIMENSION IN SYSTOLE: 2.08 CM (ref 2.1–4)
LEFT VENTRICULAR INTERNAL DIMENSION IN DIASTOLE: 2.91 CM (ref 3.5–6)
LEFT VENTRICULAR MASS: 60.03 G
LV LATERAL E/E' RATIO: 6.14 M/S
LV SEPTAL E/E' RATIO: 9.56 M/S
MV PEAK A VEL: 0.41 M/S
MV PEAK E VEL: 0.86 M/S
OHS CV CPX 1 MINUTE RECOVERY HEART RATE: 151 BPM
OHS CV CPX 85 PERCENT MAX PREDICTED HEART RATE MALE: 144
OHS CV CPX ESTIMATED METS: 8.9
OHS CV CPX MAX PREDICTED HEART RATE: 170
OHS CV CPX PATIENT IS FEMALE: 1
OHS CV CPX PATIENT IS MALE: 0
OHS CV CPX PEAK DIASTOLIC BLOOD PRESSURE: 80 MMHG
OHS CV CPX PEAK HEAR RATE: 158 BPM
OHS CV CPX PEAK RATE PRESSURE PRODUCT: NORMAL
OHS CV CPX PEAK SYSTOLIC BLOOD PRESSURE: 121 MMHG
OHS CV CPX PERCENT MAX PREDICTED HEART RATE ACHIEVED: 93
OHS CV CPX RATE PRESSURE PRODUCT PRESENTING: 8736
RA PRESSURE: 3 MMHG
RIGHT VENTRICULAR END-DIASTOLIC DIMENSION: 170 CM
STRESS ECHO POST EXERCISE DUR MIN: 7 MINUTES
STRESS ECHO POST EXERCISE DUR SEC: 0 SECONDS
SYSTOLIC BLOOD PRESSURE: 112 MMHG
TDI LATERAL: 0.14 M/S
TDI SEPTAL: 0.09 M/S
TDI: 0.12 M/S
TRICUSPID ANNULAR PLANE SYSTOLIC EXCURSION: 173 CM

## 2022-04-22 PROCEDURE — 93306 TTE W/DOPPLER COMPLETE: CPT

## 2022-04-22 PROCEDURE — A9502 TC99M TETROFOSMIN: HCPCS

## 2022-04-22 PROCEDURE — 93017 CV STRESS TEST TRACING ONLY: CPT

## 2022-04-22 PROCEDURE — 93016 CV STRESS TEST SUPVJ ONLY: CPT | Mod: ,,, | Performed by: GENERAL PRACTICE

## 2022-04-22 PROCEDURE — 93016 NUCLEAR STRESS TEST (CUPID ONLY): ICD-10-PCS | Mod: ,,, | Performed by: GENERAL PRACTICE

## 2022-04-22 PROCEDURE — 93306 TTE W/DOPPLER COMPLETE: CPT | Mod: 26,,, | Performed by: GENERAL PRACTICE

## 2022-04-22 PROCEDURE — 93306 ECHO (CUPID ONLY): ICD-10-PCS | Mod: 26,,, | Performed by: GENERAL PRACTICE

## 2022-04-22 PROCEDURE — 93018 NUCLEAR STRESS TEST (CUPID ONLY): ICD-10-PCS | Mod: ,,, | Performed by: GENERAL PRACTICE

## 2022-04-22 PROCEDURE — 93018 CV STRESS TEST I&R ONLY: CPT | Mod: ,,, | Performed by: GENERAL PRACTICE

## 2022-05-02 ENCOUNTER — OFFICE VISIT (OUTPATIENT)
Dept: CARDIOLOGY | Facility: CLINIC | Age: 42
End: 2022-05-02
Payer: MEDICAID

## 2022-05-02 VITALS
OXYGEN SATURATION: 99 % | BODY MASS INDEX: 18.95 KG/M2 | DIASTOLIC BLOOD PRESSURE: 70 MMHG | HEIGHT: 64 IN | SYSTOLIC BLOOD PRESSURE: 102 MMHG | HEART RATE: 68 BPM | WEIGHT: 111 LBS

## 2022-05-02 DIAGNOSIS — E87.6 HYPOKALEMIA: Primary | ICD-10-CM

## 2022-05-02 DIAGNOSIS — R42 DIZZINESS: ICD-10-CM

## 2022-05-02 DIAGNOSIS — R00.2 PALPITATIONS: ICD-10-CM

## 2022-05-02 PROCEDURE — 1159F MED LIST DOCD IN RCRD: CPT | Mod: CPTII,S$GLB,, | Performed by: INTERNAL MEDICINE

## 2022-05-02 PROCEDURE — 1159F PR MEDICATION LIST DOCUMENTED IN MEDICAL RECORD: ICD-10-PCS | Mod: CPTII,S$GLB,, | Performed by: INTERNAL MEDICINE

## 2022-05-02 PROCEDURE — 99204 PR OFFICE/OUTPT VISIT, NEW, LEVL IV, 45-59 MIN: ICD-10-PCS | Mod: S$GLB,,, | Performed by: INTERNAL MEDICINE

## 2022-05-02 PROCEDURE — 3008F BODY MASS INDEX DOCD: CPT | Mod: CPTII,S$GLB,, | Performed by: INTERNAL MEDICINE

## 2022-05-02 PROCEDURE — 3074F PR MOST RECENT SYSTOLIC BLOOD PRESSURE < 130 MM HG: ICD-10-PCS | Mod: CPTII,S$GLB,, | Performed by: INTERNAL MEDICINE

## 2022-05-02 PROCEDURE — 3078F DIAST BP <80 MM HG: CPT | Mod: CPTII,S$GLB,, | Performed by: INTERNAL MEDICINE

## 2022-05-02 PROCEDURE — 3008F PR BODY MASS INDEX (BMI) DOCUMENTED: ICD-10-PCS | Mod: CPTII,S$GLB,, | Performed by: INTERNAL MEDICINE

## 2022-05-02 PROCEDURE — 3078F PR MOST RECENT DIASTOLIC BLOOD PRESSURE < 80 MM HG: ICD-10-PCS | Mod: CPTII,S$GLB,, | Performed by: INTERNAL MEDICINE

## 2022-05-02 PROCEDURE — 3074F SYST BP LT 130 MM HG: CPT | Mod: CPTII,S$GLB,, | Performed by: INTERNAL MEDICINE

## 2022-05-02 PROCEDURE — 99204 OFFICE O/P NEW MOD 45 MIN: CPT | Mod: S$GLB,,, | Performed by: INTERNAL MEDICINE

## 2022-05-02 NOTE — PROGRESS NOTES
[facility] - Cardiology    Subjective:     Patient ID:  Janeth Siddiqi is a 42 y.o. female patient here for evaluation Results (Stress and Echo )      HPI:  42-year-old female referred for evaluation of multiple symptoms including chest pain, tightness, shortness of breath, dizziness, palpitations.  Patient reports she has been dealing with these symptoms for a long time.  She has had at least 2 stress test in 2 echocardiograms and 1 monitor for the evaluation of these over the last few years.  All her evaluation has returned as normal without any evidence of significant arrhythmias, structural heart disease or with ischemia on stress testing.  Patient did 7-8 minutes on a German protocol on treadmill without any evidence of ischemia.  Patient continues to report having palpitations that last multiple minutes with dizziness and lightheadedness along with chest tightness with mild-to-moderate exertion at times.  She does not drink excessive caffeine or smoke cigarettes.    Review of Systems   All other systems reviewed and are negative.       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    Anxiety     B12 deficiency 8/15/2017    Bipolar disorder     Constipation     Eating disorder     Encounter for blood transfusion     Gastroparesis     MTHFR mutation 8/15/2017    Pancreatitis     Pulmonary embolism     Seizures     AS A CHILD    Vitamin D deficiency 8/15/2017       Past Surgical History:   Procedure Laterality Date    APPENDECTOMY      bowel obstruction      x 2    LAPAROSCOPIC SALPINGO-OOPHORECTOMY Right 12/15/2021    Procedure: SALPINGO-OOPHORECTOMY, LYSIS OF ADHESIONS ;  Surgeon: Gigi Oquendo MD;  Location: [facility];  Service: OB/GYN;  Laterality: Right;    LAPAROSCOPY N/A 12/15/2021    Procedure: LAPAROSCOPY;  Surgeon: Gigi Oquendo MD;  Location: [facility];  Service: OB/GYN;  Laterality: N/A;    lysis of adhesions       OVARIAN CYST REMOVAL      uterine polyp removal         Family History   Problem Relation Age of Onset    Breast cancer Mother 58    Colon cancer Maternal Grandmother 62    Prostate cancer Maternal Grandfather     Breast cancer Father        Social History     Socioeconomic History    Marital status: Single   Tobacco Use    Smoking status: Never Smoker    Smokeless tobacco: Never Used   Substance and Sexual Activity    Alcohol use: No    Drug use: No       Current Outpatient Medications   Medication Sig Dispense Refill    apixaban (ELIQUIS) 5 mg Tab Take 1 tablet (5 mg total) by mouth 2 (two) times daily. 60 tablet 3    calcium-vitamin D3 (OYSTER SHELL CALCIUM-VIT D3) 500 mg(1,250mg) -200 unit per tablet Take 1 tablet by mouth 2 (two) times daily. 180 tablet 3    ergocalciferol (ERGOCALCIFEROL) 50,000 unit Cap 1 BY MOUTH EVERY 7 DAYS 4 capsule 6    famotidine (PEPCID) 20 MG tablet TAKE 1 TABLET BY MOUTH TWICE DAILY AS NEEDED FOR BREAKTHROUGH STOMACH PAIN AND/OR REFLUX      ferrous sulfate (FEOSOL) 325 mg (65 mg iron) Tab tablet Take 1 tablet (325 mg total) by mouth once daily. 30 tablet 3    gabapentin (NEURONTIN) 300 MG capsule TAKE 2 CAPSULES BY MOUTH AT NIGHT      HYDROcodone-acetaminophen (NORCO) 5-325 mg per tablet Take 1 tablet by mouth.      hydrOXYzine (ATARAX) 50 MG tablet TAKE 1 TABLET BY MOUTH 3 TO 4 TIMES DAILY      ibuprofen (ADVIL,MOTRIN) 800 MG tablet Take 1 tablet (800 mg total) by mouth 3 (three) times daily. 40 tablet 2    JUNEL FE 1/20, 28, 1 mg-20 mcg (21)/75 mg (7) per tablet Take 1 tablet by mouth nightly.      LINZESS 290 mcg Cap Take 290 mcg by mouth once daily.   1    multivitamin capsule Take 1 capsule by mouth nightly.      pantoprazole (PROTONIX) 40 MG tablet Take 40 mg by mouth once daily.      sertraline (ZOLOFT) 25 MG tablet Take 25 mg by mouth once daily.      sertraline (ZOLOFT) 50 MG tablet Take 50 mg by mouth once daily.      topiramate (TOPAMAX) 200  MG Tab Take 200 mg by mouth once daily.      traMADoL (ULTRAM) 50 mg tablet Take 50 mg by mouth every 6 (six) hours as needed.      traZODone (DESYREL) 150 MG tablet Take 300 mg by mouth nightly.       No current facility-administered medications for this visit.       Review of patient's allergies indicates:   Allergen Reactions    Iron analogues Anaphylaxis     Infusion only. Tolerates po    Nsaids (non-steroidal anti-inflammatory drug)      cannot take NSAIDs- upsets the stomach         Objective:        Vitals:    05/02/22 1057   BP: 102/70   Pulse: 68       Physical Exam  Vitals reviewed.   Constitutional:       Appearance: Normal appearance.   HENT:      Mouth/Throat:      Mouth: Mucous membranes are moist.   Eyes:      Extraocular Movements: Extraocular movements intact.      Pupils: Pupils are equal, round, and reactive to light.   Cardiovascular:      Rate and Rhythm: Normal rate and regular rhythm.      Pulses: Normal pulses.      Heart sounds: Normal heart sounds. No murmur heard.    No gallop.   Pulmonary:      Effort: Pulmonary effort is normal.      Breath sounds: Normal breath sounds.   Abdominal:      General: Bowel sounds are normal.      Palpations: Abdomen is soft.   Musculoskeletal:         General: Normal range of motion.   Skin:     General: Skin is warm and dry.   Neurological:      General: No focal deficit present.      Mental Status: She is alert and oriented to person, place, and time.   Psychiatric:         Mood and Affect: Mood normal.         LIPIDS - LAST 2   No results found for: CHOL, HDL, LDLCALC, TRIG, CHOLHDL    CBC - LAST 2  Lab Results   Component Value Date    WBC 3.9 02/25/2022    WBC 3.68 (L) 01/19/2022    RBC 4.46 02/25/2022    RBC 4.54 01/19/2022    HGB 13.2 02/25/2022    HGB 13.4 01/19/2022    HCT 40.3 02/25/2022    HCT 39.9 01/19/2022    MCV 90.4 02/25/2022    MCV 88 01/19/2022    MCH 29.6 02/25/2022    MCH 29.5 01/19/2022    MCHC 32.8 02/25/2022    MCHC 33.6  01/19/2022    RDW 13.2 02/25/2022    RDW 15.1 (H) 01/19/2022     02/25/2022     01/19/2022    MPV 9.9 02/25/2022    MPV 9.9 01/19/2022    GRAN 2.3 01/19/2022    GRAN 63.0 01/19/2022    LYMPH 1,860 02/25/2022    LYMPH 47.7 02/25/2022    MONO 367 02/25/2022    MONO 9.4 02/25/2022    BASO 31 02/25/2022    BASO 0.03 01/19/2022    NRBC 0 01/19/2022    NRBC 0 12/24/2021       CHEMISTRY & LIVER FUNCTION - LAST 2  Lab Results   Component Value Date     02/25/2022     01/19/2022    K 4.1 02/25/2022    K 3.0 (L) 01/19/2022     02/25/2022     01/19/2022    CO2 28 02/25/2022    CO2 18 (L) 01/19/2022    ANIONGAP 12 01/19/2022    ANIONGAP 7 (L) 12/24/2021    BUN 10 02/25/2022    BUN 11 01/19/2022    CREATININE 0.75 02/25/2022    CREATININE 0.91 01/19/2022    GLU 84 02/25/2022     01/19/2022    CALCIUM 8.9 02/25/2022    CALCIUM 9.5 01/19/2022    MG 1.9 07/21/2020    ALBUMIN 3.9 02/25/2022    ALBUMIN 4.4 01/19/2022    PROT 6.5 02/25/2022    PROT 8.0 01/19/2022    ALKPHOS 63 01/19/2022    ALKPHOS 71 12/24/2021    ALT 10 02/25/2022    ALT 18 01/19/2022    AST 13 02/25/2022    AST 30 01/19/2022    BILITOT 0.6 02/25/2022    BILITOT 0.3 01/19/2022        CARDIAC PROFILE - LAST 2  Lab Results   Component Value Date    BNP 22 07/21/2020    TROPONINI <0.030 07/21/2020        COAGULATION - LAST 2  Lab Results   Component Value Date    LABPT 15.9 (H) 07/21/2020    INR 1.3 07/21/2020    APTT 33.7 (H) 07/21/2020       ENDOCRINE & PSA - LAST 2  Lab Results   Component Value Date    TSH 2.240 01/01/2021    TSH 1.310 07/21/2020        ECHOCARDIOGRAM RESULTS  Results for orders placed in visit on 04/22/22    Echo    Interpretation Summary  · The left ventricle is normal in size with concentric hypertrophy and normal systolic function.  · Normal left ventricular diastolic function.  · Normal right ventricular size with normal right ventricular systolic function.  · Normal central venous pressure (3  mmHg).  · The estimated ejection fraction is 60%.      CURRENT/PREVIOUS VISIT EKG  Results for orders placed or performed in visit on 03/15/22   IN OFFICE EKG 12-LEAD (to [place])    Collection Time: 03/15/22  2:33 PM    Narrative    Test Reason : R94.31,    Vent. Rate : 075 BPM     Atrial Rate : 075 BPM     P-R Int : 158 ms          QRS Dur : 076 ms      QT Int : 426 ms       P-R-T Axes : 078 071 045 degrees     QTc Int : 475 ms    Normal sinus rhythm  Normal ECG  When compared with ECG of 17-DEC-2021 10:00,  Questionable change in The axis  T wave inversion no longer evident in Inferior leads  Confirmed by Sheldon Fitzpatrick MD ([id number]) on 3/22/2022 6:29:37 PM    Referred By: FREDIS   SELF           Confirmed By:Sheldon Fitzpatrick MD     No valid procedures specified.   Results for orders placed in visit on 04/22/22    Nuclear Stress Test    Interpretation Summary    The nuclear portion of this study will be reported separately.    The EKG portion of this study is negative for ischemia.    The patient reported no chest pain during the stress test.    There were no arrhythmias during stress.    No valid procedures specified.      EKGs reviewed, they showed normal sinus rhythm without any significant abnormalities.  Her Holter monitor was reviewed which showed PACs without any significant arrhythmias.  Assessment:       1. Hypokalemia    2. Palpitations    3. Dizziness           Plan:       Hypokalemia    Palpitations    Dizziness    Unsure of the exact etiology of patient's symptoms of palpitations, dizziness, chest pain.  All cardiac testing including 24 hour Holter monitor, stress test and echocardiogram do not show any specific etiology to explain the symptoms.  Suspect anxiety/stress.  Patient recommended meditation/yoga, she was recommended to keep herself hydrated as well as to eat well to gain some more weight.    Her potassium was low at the last metabolic profile, she has a home blood profile ordered by her  hematologist, patient was instructed to get those drawn.  Patient was offered a longer monitor like event recorder or loop recorder but patient would like to defer this for now for further evaluation.    Follow up in about 6 weeks (around 6/13/2022) for Palpitations and dizziness.          Elijah Kang MD  [facility] - Cardiology

## 2022-06-09 ENCOUNTER — TELEPHONE (OUTPATIENT)
Dept: HEMATOLOGY/ONCOLOGY | Facility: CLINIC | Age: 42
End: 2022-06-09

## 2022-06-09 NOTE — TELEPHONE ENCOUNTER
Talked to the patient and she states that Dr. Hurst took her off of the B12 injections due to her MTHFR mutation. She will discuss with Dr. Hurst at her next arnold.

## 2022-06-19 NOTE — PROGRESS NOTES
[facility] Hematology/Oncology  PROGRESS NOTE      Subjective:       Patient ID:   NAME: Janeth Siddiqi : 1980     42 y.o. female    Referring Doc: Lizy Diamond  Other Physicians: Jere Noriega; Ivan Bernardo    Chief Complaint:  Anemia/b12 f/u    History of Present Illness:     Patient returns today for a regularly scheduled follow-up visit.  The patient is here by herself. She is on eliquis. No CP, SOB, HA's or N/V.     She previously had surgery on right ovary in mid-Dec 2021; she has had some residual stomach issues. She is planning to have another surgery in the near future with Dr Perez and Dr Willis    She has been seeing Dr Greenfield with GI     She saw Dr barth with cardiology in may 2022    Discussed covid 19 precautions      ROS:   GEN: normal without any fever, night sweats or weight loss  HEENT: normal with no HA's, sore throat, stiff neck, changes in vision  CV: normal with no CP, SOB, PND, KRAUSE or orthopnea  PULM: normal with no SOB, cough, hemoptysis, sputum or pleuritic pain  GI: intermittent abdominal discomfort   : normal with no hematuria, dysuria  BREAST: normal with no mass, discharge, pain  SKIN: normal with no rash, erythema, bruising, or swelling    Allergies:  Review of patient's allergies indicates:   Allergen Reactions    Iron analogues Anaphylaxis     Infusion only. Tolerates po    Bactrim [sulfamethoxazole-trimethoprim] Swelling    Lidocaine Swelling    Nsaids (non-steroidal anti-inflammatory drug)      cannot take NSAIDs- upsets the stomach    Zofran [ondansetron hcl (pf)] Nausea Only       Medications:    Current Outpatient Medications:     apixaban (ELIQUIS) 5 mg Tab, Take 1 tablet (5 mg total) by mouth 2 (two) times daily., Disp: 60 tablet, Rfl: 3    calcium-vitamin D3 (OYSTER SHELL CALCIUM-VIT D3) 500 mg(1,250mg) -200 unit per tablet, Take 1 tablet by mouth 2 (two) times daily., Disp: 180 tablet, Rfl: 3    ergocalciferol (ERGOCALCIFEROL) 50,000  "unit Cap, 1 BY MOUTH EVERY 7 DAYS, Disp: 4 capsule, Rfl: 6    famotidine (PEPCID) 20 MG tablet, TAKE 1 TABLET BY MOUTH TWICE DAILY AS NEEDED FOR BREAKTHROUGH STOMACH PAIN AND/OR REFLUX, Disp: , Rfl:     ferrous sulfate (FEOSOL) 325 mg (65 mg iron) Tab tablet, Take 1 tablet (325 mg total) by mouth once daily., Disp: 30 tablet, Rfl: 3    gabapentin (NEURONTIN) 300 MG capsule, TAKE 2 CAPSULES BY MOUTH AT NIGHT, Disp: , Rfl:     HYDROcodone-acetaminophen (NORCO) 5-325 mg per tablet, Take 1 tablet by mouth., Disp: , Rfl:     hydrOXYzine (ATARAX) 50 MG tablet, TAKE 1 TABLET BY MOUTH 3 TO 4 TIMES DAILY, Disp: , Rfl:     ibuprofen (ADVIL,MOTRIN) 800 MG tablet, Take 1 tablet (800 mg total) by mouth 3 (three) times daily., Disp: 40 tablet, Rfl: 2    JUNEL FE 1/20, 28, 1 mg-20 mcg (21)/75 mg (7) per tablet, Take 1 tablet by mouth nightly., Disp: , Rfl:     LINZESS 290 mcg Cap, Take 290 mcg by mouth once daily. , Disp: , Rfl: 1    multivitamin capsule, Take 1 capsule by mouth nightly., Disp: , Rfl:     pantoprazole (PROTONIX) 40 MG tablet, Take 40 mg by mouth once daily., Disp: , Rfl:     sertraline (ZOLOFT) 25 MG tablet, Take 25 mg by mouth once daily., Disp: , Rfl:     sertraline (ZOLOFT) 50 MG tablet, Take 50 mg by mouth once daily., Disp: , Rfl:     topiramate (TOPAMAX) 200 MG Tab, Take 200 mg by mouth once daily., Disp: , Rfl:     traMADoL (ULTRAM) 50 mg tablet, Take 50 mg by mouth every 6 (six) hours as needed., Disp: , Rfl:     traZODone (DESYREL) 150 MG tablet, Take 300 mg by mouth nightly., Disp: , Rfl:     PMHx/PSHx Updates:  See patient's last visit with me on 2/28/2022  See H&P on 12/7/16        Pathology:  none          Objective:     Vitals:  Blood pressure 110/76, pulse 75, resp. rate 18, height 5' 4" (1.626 m), weight 49.9 kg (110 lb).    Physical Examination:   GEN: no apparent distress, comfortable; AAOx3  HEAD: atraumatic and normocephalic  EYES: no pallor, no icterus, PERRLA  ENT: OMM, no " pharyngeal erythema, external ears WNL; no nasal discharge; no thrush  NECK: no masses, thyroid normal, trachea midline, no LAD/LN's, supple  CV: RRR with no murmur; normal pulse; normal S1 and S2; no pedal edema  CHEST: Normal respiratory effort; CTAB; normal breath sounds; no wheeze or crackles  ABDOM:  nondistended; soft; normal bowel sounds; no rebound/guarding;   MUSC/Skeletal: ROM normal; no crepitus; joints normal; no deformities or arthropathy  EXTREM: no clubbing, cyanosis, inflammation or swelling  SKIN: no rashes, lesions, ulcers, petechiae or subcutaneous nodules  : no reza  NEURO: grossly intact; motor/sensory WNL; AAOx3; no tremors  PSYCH: normal mood, affect and behavior  LYMPH: normal cervical, supraclavicular, axillary and groin LN's            Labs:               Lab Results   Component Value Date    WBC 3.9 02/25/2022    HGB 13.2 02/25/2022    HCT 40.3 02/25/2022    MCV 90.4 02/25/2022     02/25/2022     BMP  Lab Results   Component Value Date     02/25/2022    K 4.1 02/25/2022     02/25/2022    CO2 28 02/25/2022    BUN 10 02/25/2022    CREATININE 0.75 02/25/2022    CALCIUM 8.9 02/25/2022    ANIONGAP 12 01/19/2022    ESTGFRAFRICA 115 02/25/2022    EGFRNONAA 99 02/25/2022      Lab Results   Component Value Date    IRON 147 02/25/2022    TIBC 252 02/25/2022    FERRITIN 63 02/25/2022     Homocysteine, Cardiovascular <10.4 umol/L 10.1     Vitamin B-12 200 - 1,100 pg/mL 361       Folate ng/mL 7.2         Radiology/Diagnostic Studies:    X-ray Abdomen Flat And Erect    Result Date: 7/21/2017  EXAM: KUB. INDICATION: Obstruction. COMPARISON: KUB 1/1/14. FINDINGS: Supine and upright radiographs of the abdomen demonstrate a nonobstructive bowel gas pattern. There is no free air. There is no pneumatosis. There is no portal venous gas. There is calcification. Lung bases are clear. There is no osseous abnormality.    1.Nonobstructed bowel gas pattern. Electronically signed by: KELBY DENNIS  MD Date:     07/21/17 Time:    13:49     Ct Abdomen Pelvis With Contrast    Result Date: 7/21/2017  Procedure: CT of the abdomen and pelvis with IV contrast. Technique: Axial images of the abdomen and pelvis were obtained with intravenous contrast administration. Coronal and sagittal reconstructions were provided. Total DLP was 691 mGy-cm.  Dose lowering technique, automated exposure control, was utilized for this exam. History: Left-sided abdominal pain and vomiting. Comparison: CT of the abdomen and pelvis 7/31/2015. Findings: The bilateral lung bases are clear. The heart is normal in size. There is a 7 mm focus of arterial enhancement within segment 4B of the liver which is isodense to the adjacent liver on delayed phase. This most consistent with a flash filling hemangioma. The portal vein is patent. The spleen, pancreas, and adrenal glands are normal. Bilateral kidneys are normal. There is no hydronephrosis or nephrolithiasis. The stomach and small bowel are decompressed. The appendix is not visualized, however there is no right lower quadrant inflammatory stranding. The colon is normal. The uterus and adnexa are normal for age. Urinary bladder is normal. There is trace physiologic free fluid in the lower pelvis. There is no pelvic or retroperitoneal adenopathy. The abdominal aorta is non-aneurysmal. There is no lytic or blastic osseous lesions.     No acute abnormality of the abdomen and pelvis. Electronically signed by: KELBY DENNIS MD Date:     07/21/17 Time:    15:37       I have reviewed all available lab results and radiology reports.    Assessment/Plan:   (1) 42 y.o. female with diagnosis of iron deficiency anemia and B12 deficiency  - she was previously on oral iron   - latest ferritin is 124 and much better  - B12 adeqaute  - hgb is 14.0 and WNL and stable    2/28/2022:  - latest hgb adequate at 13.2    6/20/2022:  - no recent CB[facility] labs since Feb 2022    (2) prior Pulmonary emboli with prior use of  OCP's  - on oral anticoagulant drug Eliquis  - underlying MTHFR-A heterozygous and ATIII deficiency issues  - followed by Dr Mccray with Pulm and he ordered several tests and  workup for cystic fibrosis which was negative    2/28/2022:  - continued on eliquis  - no excessive bleeding or brusiing    6/20/2022:  - continued on eliquis  - no excessive bleeding or brusiing    (3) Crohn's-like disorder/pancreatitis - followed by Dr Noriega with GI in past and now Dr Aric Sinclair with GI at [facility]    (4) Chronic GYN issues with prior bleeding (resolved) - followed by Dr Gigi Oquendo with GYN    2/28/2022:  - She had surgery on right ovary in mid-Dec 2021; she has had some abdominal discomfort and occasional nausea since that time. Dr Willis did the surgery. They suspect she is having a lot of scar tissue issues. She has been on antibiotics.  - She is seeing Dr Greenfield with GI and is seeing him again in 2 wees.     6/20/2022:  - She previously had surgery on right ovary in mid-Dec 2021; she has had some residual stomach issues. She is planning to have another surgery in the near future with Dr Perez and Dr Willis     (5) prior Vit D deficiency    (6) She saw Dr Khoobehi with plastics in [place] and had a Voluma skin filler without any difficulties     (7) SVT - seeing Dr Bernardo  and now on medications      1. Antithrombin III deficiency     2. Other iron deficiency anemia     3. Other pulmonary embolism without acute cor pulmonale, unspecified chronicity     4. B12 deficiency         PLAN:  1. Check labs every 3 months ; continue eliquis  2. F/u with PCP , GYN, and pulm  3. F/u with Dr Greenfield with [facility] as directed  4. F/u with GYN with Dr Oquendo  - planned repeat surgery in near future - she will need to hold her eliquis 2-3 days prior to surgery; she is considered a higher risk than the normal population for clot events   RTC  in 3 months    Fax note to Jere Mccray, Khoobehi, Lizy Diamond,  Peter Kaur    Discussion:       COVID-19 Discussion:    I had long discussion with patient and any applicable family about the COVID-19 coronavirus epidemic and the recommended precautions with regard to cancer and/or hematology patients. I have re-iterated the CDC recommendations for adequate hand washing, use of hand -like products, and coughing into elbow, etc. In addition, especially for our patients who are on chemotherapy and/or our otherwise immunocompromised patients, I have recommended avoidance of crowds, including movie theaters, restaurants, churches, etc. I have recommended avoidance of any sick or symptomatic family members and/or friends. I have also recommended avoidance of any raw and unwashed food products, and general avoidance of food items that have not been prepared by themselves. The patient has been asked to call us immediately with any symptom developments, issues, questions or other general concerns.     I have explained all of the above in detail and the patient understands all of the current recommendation(s). I have answered all of their questions to the best of my ability and to their complete satisfaction.   The patient is to continue with the current management plan.            Electronically signed by Miguel Hurst MD

## 2022-06-20 ENCOUNTER — OFFICE VISIT (OUTPATIENT)
Dept: HEMATOLOGY/ONCOLOGY | Facility: CLINIC | Age: 42
End: 2022-06-20
Payer: MEDICAID

## 2022-06-20 VITALS
WEIGHT: 110 LBS | RESPIRATION RATE: 18 BRPM | HEART RATE: 75 BPM | DIASTOLIC BLOOD PRESSURE: 76 MMHG | SYSTOLIC BLOOD PRESSURE: 110 MMHG | HEIGHT: 64 IN | BODY MASS INDEX: 18.78 KG/M2

## 2022-06-20 DIAGNOSIS — E53.8 B12 DEFICIENCY: ICD-10-CM

## 2022-06-20 DIAGNOSIS — D50.8 OTHER IRON DEFICIENCY ANEMIA: ICD-10-CM

## 2022-06-20 DIAGNOSIS — D68.59 ANTITHROMBIN III DEFICIENCY: Primary | ICD-10-CM

## 2022-06-20 DIAGNOSIS — I26.99 OTHER PULMONARY EMBOLISM WITHOUT ACUTE COR PULMONALE, UNSPECIFIED CHRONICITY: ICD-10-CM

## 2022-06-20 PROCEDURE — 3078F PR MOST RECENT DIASTOLIC BLOOD PRESSURE < 80 MM HG: ICD-10-PCS | Mod: CPTII,S$GLB,, | Performed by: INTERNAL MEDICINE

## 2022-06-20 PROCEDURE — 99213 PR OFFICE/OUTPT VISIT, EST, LEVL III, 20-29 MIN: ICD-10-PCS | Mod: S$GLB,,, | Performed by: INTERNAL MEDICINE

## 2022-06-20 PROCEDURE — 1160F PR REVIEW ALL MEDS BY PRESCRIBER/CLIN PHARMACIST DOCUMENTED: ICD-10-PCS | Mod: CPTII,S$GLB,, | Performed by: INTERNAL MEDICINE

## 2022-06-20 PROCEDURE — 1159F MED LIST DOCD IN RCRD: CPT | Mod: CPTII,S$GLB,, | Performed by: INTERNAL MEDICINE

## 2022-06-20 PROCEDURE — 1159F PR MEDICATION LIST DOCUMENTED IN MEDICAL RECORD: ICD-10-PCS | Mod: CPTII,S$GLB,, | Performed by: INTERNAL MEDICINE

## 2022-06-20 PROCEDURE — 3008F PR BODY MASS INDEX (BMI) DOCUMENTED: ICD-10-PCS | Mod: CPTII,S$GLB,, | Performed by: INTERNAL MEDICINE

## 2022-06-20 PROCEDURE — 3078F DIAST BP <80 MM HG: CPT | Mod: CPTII,S$GLB,, | Performed by: INTERNAL MEDICINE

## 2022-06-20 PROCEDURE — 3074F PR MOST RECENT SYSTOLIC BLOOD PRESSURE < 130 MM HG: ICD-10-PCS | Mod: CPTII,S$GLB,, | Performed by: INTERNAL MEDICINE

## 2022-06-20 PROCEDURE — 3008F BODY MASS INDEX DOCD: CPT | Mod: CPTII,S$GLB,, | Performed by: INTERNAL MEDICINE

## 2022-06-20 PROCEDURE — 3074F SYST BP LT 130 MM HG: CPT | Mod: CPTII,S$GLB,, | Performed by: INTERNAL MEDICINE

## 2022-06-20 PROCEDURE — 1160F RVW MEDS BY RX/DR IN RCRD: CPT | Mod: CPTII,S$GLB,, | Performed by: INTERNAL MEDICINE

## 2022-06-20 PROCEDURE — 99213 OFFICE O/P EST LOW 20 MIN: CPT | Mod: S$GLB,,, | Performed by: INTERNAL MEDICINE

## 2022-06-28 ENCOUNTER — OFFICE VISIT (OUTPATIENT)
Dept: CARDIOLOGY | Facility: CLINIC | Age: 42
End: 2022-06-28
Payer: MEDICAID

## 2022-06-28 VITALS
HEIGHT: 64 IN | DIASTOLIC BLOOD PRESSURE: 60 MMHG | WEIGHT: 108 LBS | HEART RATE: 69 BPM | SYSTOLIC BLOOD PRESSURE: 98 MMHG | OXYGEN SATURATION: 98 % | BODY MASS INDEX: 18.44 KG/M2

## 2022-06-28 DIAGNOSIS — R07.9 CHEST PAIN, UNSPECIFIED TYPE: ICD-10-CM

## 2022-06-28 DIAGNOSIS — R00.2 PALPITATIONS: Primary | ICD-10-CM

## 2022-06-28 PROBLEM — I77.810 AORTIC ROOT DILATION: Status: RESOLVED | Noted: 2021-06-09 | Resolved: 2022-06-28

## 2022-06-28 PROBLEM — I05.9 MITRAL VALVE DISORDER: Status: RESOLVED | Noted: 2017-08-10 | Resolved: 2022-06-28

## 2022-06-28 PROCEDURE — 1160F PR REVIEW ALL MEDS BY PRESCRIBER/CLIN PHARMACIST DOCUMENTED: ICD-10-PCS | Mod: CPTII,S$GLB,, | Performed by: INTERNAL MEDICINE

## 2022-06-28 PROCEDURE — 1160F RVW MEDS BY RX/DR IN RCRD: CPT | Mod: CPTII,S$GLB,, | Performed by: INTERNAL MEDICINE

## 2022-06-28 PROCEDURE — 3074F SYST BP LT 130 MM HG: CPT | Mod: CPTII,S$GLB,, | Performed by: INTERNAL MEDICINE

## 2022-06-28 PROCEDURE — 1159F MED LIST DOCD IN RCRD: CPT | Mod: CPTII,S$GLB,, | Performed by: INTERNAL MEDICINE

## 2022-06-28 PROCEDURE — 99212 PR OFFICE/OUTPT VISIT, EST, LEVL II, 10-19 MIN: ICD-10-PCS | Mod: S$GLB,,, | Performed by: INTERNAL MEDICINE

## 2022-06-28 PROCEDURE — 3078F DIAST BP <80 MM HG: CPT | Mod: CPTII,S$GLB,, | Performed by: INTERNAL MEDICINE

## 2022-06-28 PROCEDURE — 1159F PR MEDICATION LIST DOCUMENTED IN MEDICAL RECORD: ICD-10-PCS | Mod: CPTII,S$GLB,, | Performed by: INTERNAL MEDICINE

## 2022-06-28 PROCEDURE — 3074F PR MOST RECENT SYSTOLIC BLOOD PRESSURE < 130 MM HG: ICD-10-PCS | Mod: CPTII,S$GLB,, | Performed by: INTERNAL MEDICINE

## 2022-06-28 PROCEDURE — 3078F PR MOST RECENT DIASTOLIC BLOOD PRESSURE < 80 MM HG: ICD-10-PCS | Mod: CPTII,S$GLB,, | Performed by: INTERNAL MEDICINE

## 2022-06-28 PROCEDURE — 3008F PR BODY MASS INDEX (BMI) DOCUMENTED: ICD-10-PCS | Mod: CPTII,S$GLB,, | Performed by: INTERNAL MEDICINE

## 2022-06-28 PROCEDURE — 99212 OFFICE O/P EST SF 10 MIN: CPT | Mod: S$GLB,,, | Performed by: INTERNAL MEDICINE

## 2022-06-28 PROCEDURE — 3008F BODY MASS INDEX DOCD: CPT | Mod: CPTII,S$GLB,, | Performed by: INTERNAL MEDICINE

## 2022-06-28 NOTE — PROGRESS NOTES
[facility] - Cardiology    Subjective:     Patient ID:  Janeth Siddiqi is a 42 y.o. female patient here for evaluation Hospital Follow Up (Follow up 6 weeks for , Palpitations , dizziness , chest pains , and numbness. )      HPI:  42-year-old female here for follow-up of her palpitations and dizziness and chest pains.  Now she reports numbness.  He reports numbness in the feet and her home.  Patient has had multiple echocardiograms and monitors with no significant abnormalities detected from a cardiac standpoint that would explain her symptoms.  Her stress test was normal.    Review of Systems   All other systems reviewed and are negative.       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    Anxiety     B12 deficiency 8/15/2017    Bipolar disorder     Constipation     Eating disorder     Encounter for blood transfusion     Gastroparesis     MTHFR mutation 8/15/2017    Pancreatitis     Pulmonary embolism     Seizures     AS A CHILD    Vitamin D deficiency 8/15/2017       Past Surgical History:   Procedure Laterality Date    APPENDECTOMY      bowel obstruction      x 2    LAPAROSCOPIC SALPINGO-OOPHORECTOMY Right 12/15/2021    Procedure: SALPINGO-OOPHORECTOMY, LYSIS OF ADHESIONS ;  Surgeon: Gigi Oquendo MD;  Location: [facility];  Service: OB/GYN;  Laterality: Right;    LAPAROSCOPY N/A 12/15/2021    Procedure: LAPAROSCOPY;  Surgeon: Gigi Oquendo MD;  Location: [facility];  Service: OB/GYN;  Laterality: N/A;    lysis of adhesions      OVARIAN CYST REMOVAL      uterine polyp removal         Family History   Problem Relation Age of Onset    Breast cancer Mother 58    Colon cancer Maternal Grandmother 62    Prostate cancer Maternal Grandfather     Breast cancer Father        Social History     Socioeconomic History    Marital status: Single   Tobacco Use    Smoking status: Never Smoker    Smokeless tobacco: Never Used    Substance and Sexual Activity    Alcohol use: No    Drug use: No       Current Outpatient Medications   Medication Sig Dispense Refill    apixaban (ELIQUIS) 5 mg Tab Take 1 tablet (5 mg total) by mouth 2 (two) times daily. 60 tablet 3    ergocalciferol (ERGOCALCIFEROL) 50,000 unit Cap 1 BY MOUTH EVERY 7 DAYS 4 capsule 6    famotidine (PEPCID) 20 MG tablet TAKE 1 TABLET BY MOUTH TWICE DAILY AS NEEDED FOR BREAKTHROUGH STOMACH PAIN AND/OR REFLUX      ferrous sulfate (FEOSOL) 325 mg (65 mg iron) Tab tablet Take 1 tablet (325 mg total) by mouth once daily. 30 tablet 3    gabapentin (NEURONTIN) 300 MG capsule TAKE 2 CAPSULES BY MOUTH AT NIGHT      HYDROcodone-acetaminophen (NORCO) 5-325 mg per tablet Take 1 tablet by mouth.      hydrOXYzine (ATARAX) 50 MG tablet TAKE 1 TABLET BY MOUTH 3 TO 4 TIMES DAILY      ibuprofen (ADVIL,MOTRIN) 800 MG tablet Take 1 tablet (800 mg total) by mouth 3 (three) times daily. 40 tablet 2    JUNEL FE 1/20, 28, 1 mg-20 mcg (21)/75 mg (7) per tablet Take 1 tablet by mouth nightly.      LINZESS 290 mcg Cap Take 290 mcg by mouth once daily.   1    multivitamin capsule Take 1 capsule by mouth nightly.      pantoprazole (PROTONIX) 40 MG tablet Take 40 mg by mouth once daily.      sertraline (ZOLOFT) 25 MG tablet Take 25 mg by mouth once daily.      sertraline (ZOLOFT) 50 MG tablet Take 50 mg by mouth once daily.      topiramate (TOPAMAX) 200 MG Tab Take 200 mg by mouth once daily.      traMADoL (ULTRAM) 50 mg tablet Take 50 mg by mouth every 6 (six) hours as needed.      traZODone (DESYREL) 150 MG tablet Take 300 mg by mouth nightly.      calcium-vitamin D3 (OYSTER SHELL CALCIUM-VIT D3) 500 mg(1,250mg) -200 unit per tablet Take 1 tablet by mouth 2 (two) times daily. 180 tablet 3     No current facility-administered medications for this visit.       Review of patient's allergies indicates:   Allergen Reactions    Iron analogues Anaphylaxis     Infusion only. Tolerates po     Nsaids (non-steroidal anti-inflammatory drug)      cannot take NSAIDs- upsets the stomach         Objective:        Vitals:    06/28/22 1306   BP: 98/60   Pulse: 69       Physical Exam  Vitals reviewed.   Constitutional:       Appearance: Normal appearance.   HENT:      Mouth/Throat:      Mouth: Mucous membranes are moist.   Eyes:      Pupils: Pupils are equal, round, and reactive to light.   Cardiovascular:      Rate and Rhythm: Normal rate and regular rhythm.      Pulses: Normal pulses.      Heart sounds: Normal heart sounds. No murmur heard.    No gallop.   Pulmonary:      Effort: Pulmonary effort is normal.      Breath sounds: Normal breath sounds.   Abdominal:      General: Bowel sounds are normal.      Palpations: Abdomen is soft.   Musculoskeletal:         General: Normal range of motion.   Skin:     General: Skin is warm and dry.   Neurological:      General: No focal deficit present.      Mental Status: She is alert and oriented to person, place, and time.   Psychiatric:         Mood and Affect: Mood normal.         LIPIDS - LAST 2   No results found for: CHOL, HDL, LDLCALC, TRIG, CHOLHDL    CBC - LAST 2  Lab Results   Component Value Date    WBC 3.9 02/25/2022    WBC 3.68 (L) 01/19/2022    RBC 4.46 02/25/2022    RBC 4.54 01/19/2022    HGB 13.2 02/25/2022    HGB 13.4 01/19/2022    HCT 40.3 02/25/2022    HCT 39.9 01/19/2022    MCV 90.4 02/25/2022    MCV 88 01/19/2022    MCH 29.6 02/25/2022    MCH 29.5 01/19/2022    MCHC 32.8 02/25/2022    MCHC 33.6 01/19/2022    RDW 13.2 02/25/2022    RDW 15.1 (H) 01/19/2022     02/25/2022     01/19/2022    MPV 9.9 02/25/2022    MPV 9.9 01/19/2022    GRAN 2.3 01/19/2022    GRAN 63.0 01/19/2022    LYMPH 1,860 02/25/2022    LYMPH 47.7 02/25/2022    MONO 367 02/25/2022    MONO 9.4 02/25/2022    BASO 31 02/25/2022    BASO 0.03 01/19/2022    NRBC 0 01/19/2022    NRBC 0 12/24/2021       CHEMISTRY & LIVER FUNCTION - LAST 2  Lab Results   Component Value Date      02/25/2022     01/19/2022    K 4.1 02/25/2022    K 3.0 (L) 01/19/2022     02/25/2022     01/19/2022    CO2 28 02/25/2022    CO2 18 (L) 01/19/2022    ANIONGAP 12 01/19/2022    ANIONGAP 7 (L) 12/24/2021    BUN 10 02/25/2022    BUN 11 01/19/2022    CREATININE 0.75 02/25/2022    CREATININE 0.91 01/19/2022    GLU 84 02/25/2022     01/19/2022    CALCIUM 8.9 02/25/2022    CALCIUM 9.5 01/19/2022    MG 1.9 07/21/2020    ALBUMIN 3.9 02/25/2022    ALBUMIN 4.4 01/19/2022    PROT 6.5 02/25/2022    PROT 8.0 01/19/2022    ALKPHOS 63 01/19/2022    ALKPHOS 71 12/24/2021    ALT 10 02/25/2022    ALT 18 01/19/2022    AST 13 02/25/2022    AST 30 01/19/2022    BILITOT 0.6 02/25/2022    BILITOT 0.3 01/19/2022        CARDIAC PROFILE - LAST 2  Lab Results   Component Value Date    BNP 22 07/21/2020    TROPONINI <0.030 07/21/2020        COAGULATION - LAST 2  Lab Results   Component Value Date    LABPT 15.9 (H) 07/21/2020    INR 1.3 07/21/2020    APTT 33.7 (H) 07/21/2020       ENDOCRINE & PSA - LAST 2  Lab Results   Component Value Date    TSH 2.240 01/01/2021    TSH 1.310 07/21/2020        ECHOCARDIOGRAM RESULTS  Results for orders placed in visit on 04/22/22    Echo    Interpretation Summary  · The left ventricle is normal in size with concentric hypertrophy and normal systolic function.  · Normal left ventricular diastolic function.  · Normal right ventricular size with normal right ventricular systolic function.  · Normal central venous pressure (3 mmHg).  · The estimated ejection fraction is 60%.      CURRENT/PREVIOUS VISIT EKG  Results for orders placed or performed in visit on 03/15/22   IN OFFICE EKG 12-LEAD (to [place])    Collection Time: 03/15/22  2:33 PM    Narrative    Test Reason : R94.31,    Vent. Rate : 075 BPM     Atrial Rate : 075 BPM     P-R Int : 158 ms          QRS Dur : 076 ms      QT Int : 426 ms       P-R-T Axes : 078 071 045 degrees     QTc Int : 475 ms    Normal sinus rhythm  Normal ECG  When  compared with ECG of 17-DEC-2021 10:00,  Questionable change in The axis  T wave inversion no longer evident in Inferior leads  Confirmed by Sheldon Fitzpatrick MD ([id number]) on 3/22/2022 6:29:37 PM    Referred By: FREDIS   SELF           Confirmed By:Sheldon Fitzpatrick MD     No valid procedures specified.   Results for orders placed in visit on 04/22/22    Nuclear Stress Test    Interpretation Summary    The nuclear portion of this study will be reported separately.    The EKG portion of this study is negative for ischemia.    The patient reported no chest pain during the stress test.    There were no arrhythmias during stress.    No valid procedures specified.        Assessment:       1. Palpitations    2. Chest pain, unspecified type           Plan:       Palpitations    Chest pain, unspecified type    Unable to explain the patient's constellation of symptoms.  All testing has come back normal.  Patient was offered a longer monitor but she would like to defer that.  She was offered a 2nd.  But she would like to defer that.  Unlikely that these symptoms are cardiac in etiology.    Follow up if symptoms worsen or fail to improve.          Elijah Kang MD  [facility] - Cardiology

## 2022-07-20 ENCOUNTER — HOSPITAL ENCOUNTER (EMERGENCY)
Facility: HOSPITAL | Age: 42
Discharge: HOME | End: 2022-07-20
Attending: EMERGENCY MEDICINE
Payer: MEDICAID

## 2022-07-20 VITALS
OXYGEN SATURATION: 100 % | RESPIRATION RATE: 15 BRPM | SYSTOLIC BLOOD PRESSURE: 114 MMHG | BODY MASS INDEX: 18.61 KG/M2 | DIASTOLIC BLOOD PRESSURE: 74 MMHG | HEIGHT: 64 IN | TEMPERATURE: 98 F | WEIGHT: 109 LBS | HEART RATE: 58 BPM

## 2022-07-20 DIAGNOSIS — E87.6 HYPOKALEMIA: ICD-10-CM

## 2022-07-20 DIAGNOSIS — R42 DIZZINESS: ICD-10-CM

## 2022-07-20 DIAGNOSIS — E86.0 DEHYDRATION: Primary | ICD-10-CM

## 2022-07-20 LAB
ALBUMIN SERPL BCP-MCNC: 3.9 G/DL (ref 3.5–5.2)
ALP SERPL-CCNC: 39 U/L (ref 55–135)
ALT SERPL W/O P-5'-P-CCNC: 7 U/L (ref 10–44)
ANION GAP SERPL CALC-SCNC: 6 MMOL/L (ref 8–16)
AST SERPL-CCNC: 17 U/L (ref 10–40)
BASOPHILS # BLD AUTO: 0.02 K/UL (ref 0–0.2)
BASOPHILS NFR BLD: 0.5 % (ref 0–1.9)
BILIRUB SERPL-MCNC: 0.8 MG/DL (ref 0.1–1)
BNP SERPL-MCNC: 28 PG/ML (ref 0–99)
BUN SERPL-MCNC: 12 MG/DL (ref 6–20)
CALCIUM SERPL-MCNC: 8.9 MG/DL (ref 8.7–10.5)
CHLORIDE SERPL-SCNC: 111 MMOL/L (ref 95–110)
CO2 SERPL-SCNC: 21 MMOL/L (ref 23–29)
CREAT SERPL-MCNC: 0.9 MG/DL (ref 0.5–1.4)
DIFFERENTIAL METHOD: ABNORMAL
EOSINOPHIL # BLD AUTO: 0 K/UL (ref 0–0.5)
EOSINOPHIL NFR BLD: 0.5 % (ref 0–8)
ERYTHROCYTE [DISTWIDTH] IN BLOOD BY AUTOMATED COUNT: 14.8 % (ref 11.5–14.5)
EST. GFR  (AFRICAN AMERICAN): >60 ML/MIN/1.73 M^2
EST. GFR  (NON AFRICAN AMERICAN): >60 ML/MIN/1.73 M^2
GLUCOSE SERPL-MCNC: 78 MG/DL (ref 70–110)
HCT VFR BLD AUTO: 41.3 % (ref 37–48.5)
HGB BLD-MCNC: 13.9 G/DL (ref 12–16)
IMM GRANULOCYTES # BLD AUTO: 0.01 K/UL (ref 0–0.04)
IMM GRANULOCYTES NFR BLD AUTO: 0.2 % (ref 0–0.5)
LYMPHOCYTES # BLD AUTO: 1.4 K/UL (ref 1–4.8)
LYMPHOCYTES NFR BLD: 32.6 % (ref 18–48)
MCH RBC QN AUTO: 29.8 PG (ref 27–31)
MCHC RBC AUTO-ENTMCNC: 33.7 G/DL (ref 32–36)
MCV RBC AUTO: 89 FL (ref 82–98)
MONOCYTES # BLD AUTO: 0.4 K/UL (ref 0.3–1)
MONOCYTES NFR BLD: 10.3 % (ref 4–15)
NEUTROPHILS # BLD AUTO: 2.4 K/UL (ref 1.8–7.7)
NEUTROPHILS NFR BLD: 55.9 % (ref 38–73)
NRBC BLD-RTO: 0 /100 WBC
PLATELET # BLD AUTO: 231 K/UL (ref 150–450)
PMV BLD AUTO: 9.9 FL (ref 9.2–12.9)
POTASSIUM SERPL-SCNC: 3.1 MMOL/L (ref 3.5–5.1)
PROT SERPL-MCNC: 7.3 G/DL (ref 6–8.4)
RBC # BLD AUTO: 4.66 M/UL (ref 4–5.4)
SARS-COV-2 RDRP RESP QL NAA+PROBE: NEGATIVE
SODIUM SERPL-SCNC: 138 MMOL/L (ref 136–145)
TROPONIN I SERPL DL<=0.01 NG/ML-MCNC: <0.03 NG/ML
WBC # BLD AUTO: 4.26 K/UL (ref 3.9–12.7)

## 2022-07-20 PROCEDURE — 80053 COMPREHEN METABOLIC PANEL: CPT | Performed by: EMERGENCY MEDICINE

## 2022-07-20 PROCEDURE — 85025 COMPLETE CBC W/AUTO DIFF WBC: CPT | Performed by: EMERGENCY MEDICINE

## 2022-07-20 PROCEDURE — 84484 ASSAY OF TROPONIN QUANT: CPT | Performed by: EMERGENCY MEDICINE

## 2022-07-20 PROCEDURE — 96361 HYDRATE IV INFUSION ADD-ON: CPT

## 2022-07-20 PROCEDURE — 63600175 PHARM REV CODE 636 W HCPCS: Performed by: EMERGENCY MEDICINE

## 2022-07-20 PROCEDURE — 99284 EMERGENCY DEPT VISIT MOD MDM: CPT | Mod: 25

## 2022-07-20 PROCEDURE — 93010 EKG 12-LEAD: ICD-10-PCS | Mod: ,,, | Performed by: SPECIALIST

## 2022-07-20 PROCEDURE — 93010 ELECTROCARDIOGRAM REPORT: CPT | Mod: ,,, | Performed by: SPECIALIST

## 2022-07-20 PROCEDURE — 96374 THER/PROPH/DIAG INJ IV PUSH: CPT

## 2022-07-20 PROCEDURE — 93005 ELECTROCARDIOGRAM TRACING: CPT | Performed by: SPECIALIST

## 2022-07-20 PROCEDURE — 83880 ASSAY OF NATRIURETIC PEPTIDE: CPT | Performed by: EMERGENCY MEDICINE

## 2022-07-20 PROCEDURE — U0002 COVID-19 LAB TEST NON-CDC: HCPCS | Performed by: EMERGENCY MEDICINE

## 2022-07-20 PROCEDURE — 25000003 PHARM REV CODE 250: Performed by: EMERGENCY MEDICINE

## 2022-07-20 RX ORDER — FAMOTIDINE 20 MG/1
20 TABLET, FILM COATED ORAL 2 TIMES DAILY
Qty: 20 TABLET | Refills: 0 | Status: ON HOLD | OUTPATIENT
Start: 2022-07-20 | End: 2023-04-25

## 2022-07-20 RX ORDER — ONDANSETRON 2 MG/ML
4 INJECTION INTRAMUSCULAR; INTRAVENOUS
Status: COMPLETED | OUTPATIENT
Start: 2022-07-20 | End: 2022-07-20

## 2022-07-20 RX ORDER — PANTOPRAZOLE SODIUM 40 MG/10ML
40 INJECTION, POWDER, LYOPHILIZED, FOR SOLUTION INTRAVENOUS
Status: DISCONTINUED | OUTPATIENT
Start: 2022-07-20 | End: 2022-07-20 | Stop reason: HOSPADM

## 2022-07-20 RX ORDER — ONDANSETRON 4 MG/1
4 TABLET, FILM COATED ORAL EVERY 6 HOURS PRN
Qty: 20 TABLET | Refills: 1 | Status: SHIPPED | OUTPATIENT
Start: 2022-07-20 | End: 2022-09-27 | Stop reason: CLARIF

## 2022-07-20 RX ORDER — POTASSIUM CHLORIDE 750 MG/1
50 CAPSULE, EXTENDED RELEASE ORAL ONCE
Status: COMPLETED | OUTPATIENT
Start: 2022-07-20 | End: 2022-07-20

## 2022-07-20 RX ADMIN — SODIUM CHLORIDE, SODIUM LACTATE, POTASSIUM CHLORIDE, AND CALCIUM CHLORIDE 1000 ML: .6; .31; .03; .02 INJECTION, SOLUTION INTRAVENOUS at 05:07

## 2022-07-20 RX ADMIN — POTASSIUM CHLORIDE 50 MEQ: 750 CAPSULE, EXTENDED RELEASE ORAL at 06:07

## 2022-07-20 RX ADMIN — SODIUM CHLORIDE, SODIUM LACTATE, POTASSIUM CHLORIDE, AND CALCIUM CHLORIDE 1000 ML: .6; .31; .03; .02 INJECTION, SOLUTION INTRAVENOUS at 02:07

## 2022-07-20 RX ADMIN — ONDANSETRON 4 MG: 2 INJECTION INTRAMUSCULAR; INTRAVENOUS at 03:07

## 2022-07-20 NOTE — ED TRIAGE NOTES
General fatigue, body aches, n/v/d, headache x 2 days.  Denies fever.   Near syncope earlier today.

## 2022-07-20 NOTE — DISCHARGE INSTRUCTIONS
Drink lots of fluids.  Rest.  Return to emergency department for worsening symptoms or any problems.

## 2022-07-20 NOTE — ED PROVIDER NOTES
Encounter Date: 7/20/2022       History     Chief Complaint   Patient presents with    Pre Syncope     Patient reports feeling as though she is going to pass out since yesterday, reports nausea and body aches as well      42-year-old female presented emergency department complaining of feeling weak and dizzy.  Patient has history of inflammatory bowel disease and has diarrhea and nausea and vomiting and frequently gets dehydrated.  Patient said yesterday she was feeling dizzy when she stands up and feels like she was going to pass out.  Denies any chest pain or shortness of breath or focal weakness or numbness.  Patient does have mild abdominal pain which is chronic per patient and patient also had previous abdominal surgeries.  Patient denies dysuria or hematuria.  Patient on blood thinners as she had previous blood clots.  Patient also taking antibiotic as patient has recent infection on the face which is now healed.        Review of patient's allergies indicates:   Allergen Reactions    Iron analogues Anaphylaxis     Infusion only. Tolerates po    Nsaids (non-steroidal anti-inflammatory drug)      cannot take NSAIDs- upsets the stomach     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    Anxiety     B12 deficiency 8/15/2017    Bipolar disorder     Constipation     Eating disorder     Encounter for blood transfusion     Gastroparesis     MTHFR mutation 8/15/2017    Pancreatitis     Pulmonary embolism     Seizures     AS A CHILD    Vitamin D deficiency 8/15/2017     Past Surgical History:   Procedure Laterality Date    APPENDECTOMY      bowel obstruction      x 2    LAPAROSCOPIC SALPINGO-OOPHORECTOMY Right 12/15/2021    Procedure: SALPINGO-OOPHORECTOMY, LYSIS OF ADHESIONS ;  Surgeon: Gigi Oquendo MD;  Location: [facility];  Service: OB/GYN;  Laterality: Right;    LAPAROSCOPY N/A 12/15/2021    Procedure: LAPAROSCOPY;  Surgeon:  Gigi Oquendo MD;  Location: [facility];  Service: OB/GYN;  Laterality: N/A;    lysis of adhesions      OVARIAN CYST REMOVAL      uterine polyp removal       Family History   Problem Relation Age of Onset    Breast cancer Mother 58    Colon cancer Maternal Grandmother 62    Prostate cancer Maternal Grandfather     Breast cancer Father      Social History     Tobacco Use    Smoking status: Never Smoker    Smokeless tobacco: Never Used   Substance Use Topics    Alcohol use: No    Drug use: No     Review of Systems   Constitutional: Positive for fatigue.   HENT: Negative.    Eyes: Negative.    Respiratory: Negative.    Cardiovascular: Negative.  Negative for chest pain.   Gastrointestinal: Positive for diarrhea, nausea and vomiting.   Endocrine: Negative.    Genitourinary: Negative.    Musculoskeletal: Negative.    Skin: Negative.    Allergic/Immunologic: Negative.    Neurological: Positive for dizziness.   Hematological: Negative.    Psychiatric/Behavioral: Negative.    All other systems reviewed and are negative.      Physical Exam     Initial Vitals [07/20/22 1349]   BP Pulse Resp Temp SpO2   105/79 91 18 98 °F (36.7 °C) 99 %      MAP       --         Physical Exam    Nursing note and vitals reviewed.  Constitutional: She appears well-developed and well-nourished.   HENT:   Head: Normocephalic and atraumatic.   Nose: Nose normal.   Mouth/Throat: Oropharynx is clear and moist.   Eyes: Conjunctivae and EOM are normal. Pupils are equal, round, and reactive to light.   Neck: Neck supple. No thyromegaly present. No tracheal deviation present. No JVD present.   Normal range of motion.  Cardiovascular: Normal rate, regular rhythm, normal heart sounds and intact distal pulses.   No murmur heard.  Pulmonary/Chest: Breath sounds normal. No stridor. No respiratory distress. She has no wheezes. She has no rales. She exhibits no tenderness.   Abdominal: Abdomen is soft. Bowel sounds are normal. There is no rebound and  no guarding.   Musculoskeletal:         General: No edema. Normal range of motion.      Cervical back: Normal range of motion and neck supple.     Neurological: She is alert and oriented to person, place, and time. She has normal strength. No cranial nerve deficit or sensory deficit. GCS score is 15. GCS eye subscore is 4. GCS verbal subscore is 5. GCS motor subscore is 6.   Skin: Skin is warm. Capillary refill takes less than 2 seconds.   Psychiatric: She has a normal mood and affect. Thought content normal.         ED Course   Procedures  Labs Reviewed   CBC W/ AUTO DIFFERENTIAL - Abnormal; Notable for the following components:       Result Value    RDW 14.8 (*)     All other components within normal limits   COMPREHENSIVE METABOLIC PANEL - Abnormal; Notable for the following components:    Potassium 3.1 (*)     Chloride 111 (*)     CO2 21 (*)     Alkaline Phosphatase 39 (*)     ALT 7 (*)     Anion Gap 6 (*)     All other components within normal limits   TROPONIN I   B-TYPE NATRIURETIC PEPTIDE   SARS-COV-2 RNA AMPLIFICATION, QUAL   POCT URINE PREGNANCY     EKG Readings: (Independently Interpreted)   Initial Reading: No STEMI. Rhythm: Normal Sinus Rhythm. Ectopy: No Ectopy. Conduction: Normal.     ECG Results          EKG 12-lead (In process)  Result time 07/20/22 15:13:54    In process by Interface, Lab In [facility] (07/20/22 15:13:54)                 Narrative:    Test Reason : R06.02,    Vent. Rate : 083 BPM     Atrial Rate : 083 BPM     P-R Int : 152 ms          QRS Dur : 080 ms      QT Int : 390 ms       P-R-T Axes : 079 056 030 degrees     QTc Int : 458 ms    Normal sinus rhythm  Possible Left atrial enlargement  Borderline Abnormal ECG  When compared with ECG of 15-MAR-2022 14:33,  No significant change was found    Referred By: [email or web address]   SELF           Confirmed By:                             Imaging Results    None          Medications   pantoprazole injection 40 mg (40 mg Intravenous Not Given 7/20/22  1526)   lactated ringers bolus 1,000 mL (1,000 mLs Intravenous New Bag 7/20/22 1730)   potassium chloride CR capsule 50 mEq (has no administration in time range)   lactated ringers bolus 1,000 mL (1,000 mLs Intravenous New Bag 7/20/22 1459)   ondansetron injection 4 mg (4 mg Intravenous Given 7/20/22 1526)     Medical Decision Making:   Differential Diagnosis:   42-year-old female presented emergency department with nausea vomiting and diarrhea and states she feels dehydrated and had similar symptoms in the past.  Patient states he feels dizzy when she stands and having orthostatic symptoms.  Symptoms almost completely resolved after IV fluids given.  Patient hemodynamically stable.  Screening labs reviewed.  Hypokalemia treated.  Patient otherwise nontoxic and feels well.  Workup unremarkable and as feeling better discharged with instructions and follow-up.  Patient advised of oral rehydration.  Return precautions given  Clinical Tests:   Lab Tests: Reviewed  Medical Tests: Reviewed                      Clinical Impression:   Final diagnoses:  [E86.0] Dehydration (Primary)  [E87.6] Hypokalemia  [R42] Dizziness          ED Disposition Condition    Discharge Stable        ED Prescriptions     Medication Sig Dispense Start Date End Date Auth. Provider    ondansetron (ZOFRAN) 4 MG tablet Take 1 tablet (4 mg total) by mouth every 6 (six) hours as needed. 20 tablet 7/20/2022 7/20/2023 Chyna Kenney MD    famotidine (PEPCID) 20 MG tablet Take 1 tablet (20 mg total) by mouth 2 (two) times daily. 20 tablet 7/20/2022 7/20/2023 Chyna Kenney MD        Follow-up Information     Follow up With Specialties Details Why Contact Info    Lizy Diamond MD Pediatrics In 2 days  [address] [facility]  [address] FLO[address]  [facility] [address]  [phone]             Chyna Kenney MD  07/20/22 [id number]

## 2022-08-30 ENCOUNTER — HOSPITAL ENCOUNTER (EMERGENCY)
Facility: HOSPITAL | Age: 42
Discharge: HOME | End: 2022-08-30
Attending: EMERGENCY MEDICINE
Payer: MEDICAID

## 2022-08-30 VITALS
OXYGEN SATURATION: 100 % | TEMPERATURE: 99 F | DIASTOLIC BLOOD PRESSURE: 68 MMHG | SYSTOLIC BLOOD PRESSURE: 108 MMHG | HEIGHT: 64 IN | RESPIRATION RATE: 31 BRPM | HEART RATE: 62 BPM | WEIGHT: 105 LBS | BODY MASS INDEX: 17.93 KG/M2

## 2022-08-30 DIAGNOSIS — R50.9 FEVER, UNSPECIFIED FEVER CAUSE: ICD-10-CM

## 2022-08-30 DIAGNOSIS — R11.0 NAUSEA: ICD-10-CM

## 2022-08-30 DIAGNOSIS — R10.11 RIGHT UPPER QUADRANT ABDOMINAL PAIN: Primary | ICD-10-CM

## 2022-08-30 LAB
ALBUMIN SERPL BCP-MCNC: 4.2 G/DL (ref 3.5–5.2)
ALP SERPL-CCNC: 50 U/L (ref 55–135)
ALT SERPL W/O P-5'-P-CCNC: 12 U/L (ref 10–44)
ANION GAP SERPL CALC-SCNC: 5 MMOL/L (ref 8–16)
AST SERPL-CCNC: 18 U/L (ref 10–40)
B-HCG UR QL: NEGATIVE
BASOPHILS # BLD AUTO: 0.03 K/UL (ref 0–0.2)
BASOPHILS NFR BLD: 0.7 % (ref 0–1.9)
BILIRUB SERPL-MCNC: 0.8 MG/DL (ref 0.1–1)
BILIRUB UR QL STRIP: NEGATIVE
BUN SERPL-MCNC: 12 MG/DL (ref 6–20)
CALCIUM SERPL-MCNC: 9.3 MG/DL (ref 8.7–10.5)
CHLORIDE SERPL-SCNC: 109 MMOL/L (ref 95–110)
CLARITY UR: ABNORMAL
CO2 SERPL-SCNC: 24 MMOL/L (ref 23–29)
COLOR UR: YELLOW
CREAT SERPL-MCNC: 0.8 MG/DL (ref 0.5–1.4)
CTP QC/QA: YES
DIFFERENTIAL METHOD: ABNORMAL
EOSINOPHIL # BLD AUTO: 0 K/UL (ref 0–0.5)
EOSINOPHIL NFR BLD: 0.5 % (ref 0–8)
ERYTHROCYTE [DISTWIDTH] IN BLOOD BY AUTOMATED COUNT: 13.6 % (ref 11.5–14.5)
EST. GFR  (NO RACE VARIABLE): >60 ML/MIN/1.73 M^2
GLUCOSE SERPL-MCNC: 82 MG/DL (ref 70–110)
GLUCOSE UR QL STRIP: NEGATIVE
HCT VFR BLD AUTO: 42.8 % (ref 37–48.5)
HGB BLD-MCNC: 13.9 G/DL (ref 12–16)
HGB UR QL STRIP: NEGATIVE
IMM GRANULOCYTES # BLD AUTO: 0.01 K/UL (ref 0–0.04)
IMM GRANULOCYTES NFR BLD AUTO: 0.2 % (ref 0–0.5)
KETONES UR QL STRIP: ABNORMAL
LEUKOCYTE ESTERASE UR QL STRIP: NEGATIVE
LIPASE SERPL-CCNC: 58 U/L (ref 4–60)
LYMPHOCYTES # BLD AUTO: 2 K/UL (ref 1–4.8)
LYMPHOCYTES NFR BLD: 49.8 % (ref 18–48)
MAGNESIUM SERPL-MCNC: 2 MG/DL (ref 1.6–2.6)
MCH RBC QN AUTO: 29.5 PG (ref 27–31)
MCHC RBC AUTO-ENTMCNC: 32.5 G/DL (ref 32–36)
MCV RBC AUTO: 91 FL (ref 82–98)
MONOCYTES # BLD AUTO: 0.5 K/UL (ref 0.3–1)
MONOCYTES NFR BLD: 12 % (ref 4–15)
NEUTROPHILS # BLD AUTO: 1.5 K/UL (ref 1.8–7.7)
NEUTROPHILS NFR BLD: 36.8 % (ref 38–73)
NITRITE UR QL STRIP: NEGATIVE
NRBC BLD-RTO: 0 /100 WBC
PH UR STRIP: 6 [PH] (ref 5–8)
PLATELET # BLD AUTO: 208 K/UL (ref 150–450)
PMV BLD AUTO: 10.1 FL (ref 9.2–12.9)
POTASSIUM SERPL-SCNC: 4 MMOL/L (ref 3.5–5.1)
PROT SERPL-MCNC: 7.7 G/DL (ref 6–8.4)
PROT UR QL STRIP: ABNORMAL
RBC # BLD AUTO: 4.71 M/UL (ref 4–5.4)
SODIUM SERPL-SCNC: 138 MMOL/L (ref 136–145)
SP GR UR STRIP: 1.03 (ref 1–1.03)
URN SPEC COLLECT METH UR: ABNORMAL
UROBILINOGEN UR STRIP-ACNC: NEGATIVE EU/DL
WBC # BLD AUTO: 4.1 K/UL (ref 3.9–12.7)

## 2022-08-30 PROCEDURE — 81003 URINALYSIS AUTO W/O SCOPE: CPT | Performed by: NURSE PRACTITIONER

## 2022-08-30 PROCEDURE — 99285 EMERGENCY DEPT VISIT HI MDM: CPT | Mod: 25

## 2022-08-30 PROCEDURE — 83690 ASSAY OF LIPASE: CPT | Performed by: EMERGENCY MEDICINE

## 2022-08-30 PROCEDURE — 25000003 PHARM REV CODE 250: Performed by: NURSE PRACTITIONER

## 2022-08-30 PROCEDURE — 63600175 PHARM REV CODE 636 W HCPCS: Performed by: EMERGENCY MEDICINE

## 2022-08-30 PROCEDURE — 25500020 PHARM REV CODE 255: Performed by: EMERGENCY MEDICINE

## 2022-08-30 PROCEDURE — 83735 ASSAY OF MAGNESIUM: CPT | Performed by: EMERGENCY MEDICINE

## 2022-08-30 PROCEDURE — 96375 TX/PRO/DX INJ NEW DRUG ADDON: CPT

## 2022-08-30 PROCEDURE — 80053 COMPREHEN METABOLIC PANEL: CPT | Performed by: EMERGENCY MEDICINE

## 2022-08-30 PROCEDURE — 96374 THER/PROPH/DIAG INJ IV PUSH: CPT | Mod: 59

## 2022-08-30 PROCEDURE — 85025 COMPLETE CBC W/AUTO DIFF WBC: CPT | Performed by: EMERGENCY MEDICINE

## 2022-08-30 PROCEDURE — 81025 URINE PREGNANCY TEST: CPT | Performed by: NURSE PRACTITIONER

## 2022-08-30 RX ORDER — ONDANSETRON 2 MG/ML
4 INJECTION INTRAMUSCULAR; INTRAVENOUS
Status: COMPLETED | OUTPATIENT
Start: 2022-08-30 | End: 2022-08-30

## 2022-08-30 RX ORDER — ONDANSETRON 4 MG/1
4 TABLET, ORALLY DISINTEGRATING ORAL
Status: COMPLETED | OUTPATIENT
Start: 2022-08-30 | End: 2022-08-30

## 2022-08-30 RX ORDER — CLINDAMYCIN PHOSPHATE 10 UG/ML
LOTION TOPICAL
Status: ON HOLD | COMMUNITY
Start: 2022-07-25 | End: 2023-04-25

## 2022-08-30 RX ORDER — LIDOCAINE AND PRILOCAINE 25; 25 MG/G; MG/G
CREAM TOPICAL
COMMUNITY
Start: 2022-07-27 | End: 2022-09-27 | Stop reason: CLARIF

## 2022-08-30 RX ORDER — HYDROCORTISONE 25 MG/G
CREAM TOPICAL 2 TIMES DAILY PRN
COMMUNITY
Start: 2022-07-25

## 2022-08-30 RX ORDER — ONDANSETRON 8 MG/1
8 TABLET, ORALLY DISINTEGRATING ORAL EVERY 8 HOURS PRN
Qty: 15 TABLET | Refills: 0 | Status: SHIPPED | OUTPATIENT
Start: 2022-08-30

## 2022-08-30 RX ORDER — DICYCLOMINE HYDROCHLORIDE 20 MG/1
20 TABLET ORAL 2 TIMES DAILY
Qty: 20 TABLET | Refills: 0 | Status: SHIPPED | OUTPATIENT
Start: 2022-08-30 | End: 2022-09-09

## 2022-08-30 RX ORDER — MORPHINE SULFATE 4 MG/ML
4 INJECTION, SOLUTION INTRAMUSCULAR; INTRAVENOUS
Status: COMPLETED | OUTPATIENT
Start: 2022-08-30 | End: 2022-08-30

## 2022-08-30 RX ADMIN — IOHEXOL 100 ML: 350 INJECTION, SOLUTION INTRAVENOUS at 04:08

## 2022-08-30 RX ADMIN — MORPHINE SULFATE 4 MG: 4 INJECTION, SOLUTION INTRAMUSCULAR; INTRAVENOUS at 04:08

## 2022-08-30 RX ADMIN — ONDANSETRON 4 MG: 4 TABLET, ORALLY DISINTEGRATING ORAL at 02:08

## 2022-08-30 RX ADMIN — ONDANSETRON HYDROCHLORIDE 4 MG: 2 SOLUTION INTRAMUSCULAR; INTRAVENOUS at 04:08

## 2022-08-30 NOTE — FIRST PROVIDER EVALUATION
Emergency Department TeleTriage Encounter Note      CHIEF COMPLAINT    Chief Complaint   Patient presents with    Abdominal Pain     Pt here for pain in right side abd moving to the back, painful urination and nausea when eating.        VITAL SIGNS   Initial Vitals [08/30/22 1348]   BP Pulse Resp Temp SpO2   102/76 87 18 99.2 °F (37.3 °C) 96 %      MAP       --            ALLERGIES    Review of patient's allergies indicates:   Allergen Reactions    Iron analogues Anaphylaxis     Infusion only. Tolerates po    Nsaids (non-steroidal anti-inflammatory drug)      cannot take NSAIDs- upsets the stomach       PROVIDER TRIAGE NOTE  Right flank pain radiating to the back.  Onset 3-4d ago.  Aching.  Took tylenol-it was not effective.  Subjective fever.        ORDERS  Labs Reviewed - No data to display    ED Orders (720h ago, onward)      Start Ordered     Status Ordering Provider    08/30/22 1415 08/30/22 1411  ondansetron disintegrating tablet 4 mg  ED 1 Time         Ordered ADRIANE AMAYA.    08/30/22 1412 08/30/22 1411  POCT urine pregnancy  Once         Ordered ADRIANE AMAYA    08/30/22 1412 08/30/22 1411  Urinalysis, Reflex to Urine Culture Urine, Clean Catch  STAT         Ordered ADRIANE AMAYA.              Virtual Visit Note: The provider triage portion of this emergency department evaluation and documentation was performed via [employer], a HIPAA-compliant telemedicine application, in concert with a tele-presenter in the room. A face to face patient evaluation with one of my colleagues will occur once the patient is placed in an emergency department room.      DISCLAIMER: This note was prepared with [employer] voice recognition transcription software. Garbled syntax, mangled pronouns, and other bizarre constructions may be attributed to that software system.

## 2022-08-31 NOTE — ED PROVIDER NOTES
Encounter Date: 8/30/2022       History     Chief Complaint   Patient presents with    Abdominal Pain     Pt here for pain in right side abd moving to the back, painful urination and nausea when eating.      42-year-old female with history of pancreatitis, gastric paresis, prior small bowel obstruction, inflammatory bowel disease, chronic constipation, prior right ovarian cyst removal, with multiple abdominal surgeries, MTHFR mutation with history of PE on eliquis, presents to the ER due to a constant aching but intermittent sharp right-sided abdominal pain radiating to the right flank since yesterday.  Reports 7/10 pain with nausea but no vomiting.  No diarrhea or constipation no blood in stool.  She also reports dysuria with frequency no urgency.  She has been having low-grade fevers.  No vaginal bleeding.  GI Md Is .     Review of patient's allergies indicates:   Allergen Reactions    Iron analogues Anaphylaxis     Infusion only. Tolerates po    Nsaids (non-steroidal anti-inflammatory drug)      cannot take NSAIDs- upsets the stomach     Past Medical History:   Diagnosis Date    Anticoagulant long-term use     Antithrombin III deficiency 8/15/2017    Anxiety     B12 deficiency 8/15/2017    Bipolar disorder     Constipation     Eating disorder     Encounter for blood transfusion     Gastroparesis     MTHFR mutation 8/15/2017    Pancreatitis     Pulmonary embolism     Seizures     AS A CHILD    Vitamin D deficiency 8/15/2017     Past Surgical History:   Procedure Laterality Date    APPENDECTOMY      bowel obstruction      x 2    LAPAROSCOPIC SALPINGO-OOPHORECTOMY Right 12/15/2021    Procedure: SALPINGO-OOPHORECTOMY, LYSIS OF ADHESIONS ;  Surgeon: Gigi Oquendo MD;  Location: [facility];  Service: OB/GYN;  Laterality: Right;    LAPAROSCOPY N/A 12/15/2021    Procedure: LAPAROSCOPY;  Surgeon: Gigi Oquendo MD;  Location: [facility];  Service: OB/GYN;  Laterality: N/A;    lysis of adhesions      OVARIAN CYST  REMOVAL      uterine polyp removal       Family History   Problem Relation Age of Onset    Breast cancer Mother 58    Colon cancer Maternal Grandmother 62    Prostate cancer Maternal Grandfather     Breast cancer Father      Social History     Tobacco Use    Smoking status: Never    Smokeless tobacco: Never   Substance Use Topics    Alcohol use: No    Drug use: No     Review of Systems   Constitutional:  Positive for fever. Negative for activity change, appetite change, chills, diaphoresis and fatigue.   HENT:  Negative for congestion, postnasal drip, rhinorrhea and sore throat.    Respiratory:  Negative for cough, chest tightness, shortness of breath, wheezing and stridor.    Cardiovascular:  Negative for chest pain and palpitations.   Gastrointestinal:  Positive for abdominal pain and nausea. Negative for abdominal distention, blood in stool, constipation, diarrhea and vomiting.   Genitourinary:  Positive for dysuria, flank pain and frequency. Negative for hematuria and urgency.   Musculoskeletal:  Negative for arthralgias, back pain, myalgias, neck pain and neck stiffness.   Skin:  Negative for rash.   Neurological:  Negative for dizziness, syncope, weakness, light-headedness and headaches.   Hematological:  Does not bruise/bleed easily.   Psychiatric/Behavioral:  Negative for confusion. The patient is not nervous/anxious.    All other systems reviewed and are negative.    Physical Exam     Initial Vitals [08/30/22 1348]   BP Pulse Resp Temp SpO2   102/76 87 18 99.2 °F (37.3 °C) 96 %      MAP       --         Physical Exam    Nursing note and vitals reviewed.  Constitutional: She appears well-developed and well-nourished. She is not diaphoretic. No distress.   HENT:   Head: Normocephalic and atraumatic.   Right Ear: External ear normal.   Left Ear: External ear normal.   Nose: Nose normal.   Mouth/Throat: Oropharynx is clear and moist.   Eyes: Conjunctivae and EOM are normal. Pupils are equal, round, and reactive  to light.   Neck: Neck supple. No tracheal deviation present.   Normal range of motion.  Cardiovascular:  Normal rate, regular rhythm, normal heart sounds and intact distal pulses.     Exam reveals no gallop and no friction rub.       No murmur heard.  Pulmonary/Chest: Breath sounds normal. No stridor. No respiratory distress. She has no wheezes. She has no rhonchi. She has no rales. She exhibits no tenderness.   Abdominal: Abdomen is soft and flat. Bowel sounds are normal. She exhibits no distension, no fluid wave and no mass. There is no hepatomegaly. There is abdominal tenderness.   There is right CVA tenderness.  No left CVA tenderness.     There is no rebound and no guarding.   Musculoskeletal:         General: No edema. Normal range of motion.      Cervical back: Normal range of motion and neck supple.     Neurological: She is alert and oriented to person, place, and time. She has normal strength. No cranial nerve deficit or sensory deficit.   Skin: Skin is warm and dry. No rash noted. No erythema. No pallor.   Psychiatric: She has a normal mood and affect. Her behavior is normal. Judgment and thought content normal.       ED Course   Procedures  Labs Reviewed   URINALYSIS, REFLEX TO URINE CULTURE - Abnormal; Notable for the following components:       Result Value    Appearance, UA Hazy (*)     Protein, UA Trace (*)     Ketones, UA Trace (*)     All other components within normal limits    Narrative:     Specimen Source->Urine   CBC W/ AUTO DIFFERENTIAL - Abnormal; Notable for the following components:    Gran # (ANC) 1.5 (*)     Gran % 36.8 (*)     Lymph % 49.8 (*)     All other components within normal limits   COMPREHENSIVE METABOLIC PANEL - Abnormal; Notable for the following components:    Alkaline Phosphatase 50 (*)     Anion Gap 5 (*)     All other components within normal limits   LIPASE   MAGNESIUM   POCT URINE PREGNANCY          Imaging Results              US Abdomen Limited (Final result)  Result  time 08/30/22 18:12:13      Final result by Robert Sorto MD (08/30/22 18:12:13)                   Narrative:    Reason: ruq abd pain    COMPARISON: None    FINDINGS:    Liver maintains normal echogenicity without focal mass or intrahepatic bile duct dilation. Hepatopedal flow in main portal vein.    Gallbladder is normal. Common duct diameter is normal.    Visualized pancreas is unremarkable. Right kidney is normal size. There is questionable right renal calculus at the midpole. No hydronephrosis. Aorta is nonaneurysmal.    IMPRESSION:    Questionable nonobstructing right renal calculus.    Electronically signed by:  Robert Sorto DO  8/30/2022 6:12 PM CDT Workstation: [id number]                                     CT Abdomen Pelvis With Contrast (Final result)  Result time 08/30/22 16:46:17      Final result by Vicky Moore MD (08/30/22 16:46:17)                   Narrative:    All CT scans at this facility used dose modulation, iterative reconstruction and/or weight-based dosing when appropriate to reduce radiation doses  as low as reasonably achievable.    HISTORY: Bowel obstruction suspected    FINDINGS: Axial postcontrast imaging was performed with 100 mL Omnipaque 350 IV contrast .    COMPARISON: 1/19/2022    CT ABDOMEN: The lung bases are clear. There is no pericardial effusion.    The liver is hypodense compatible with fatty infiltration. There is a 5 mm cyst within the dome of the right lobe of the liver. There is no biliary duct dilatation.    The spleen, pancreas, gallbladder and adrenal glands are normal.    The kidneys enhance symmetrically without hydronephrosis or calculi.    There are no thick-walled or dilated bowel loops. There is no free fluid. There is no mesenteric or retroperitoneal adenopathy. The aorta is normal in caliber.    The musculature is normal.    CT PELVIS: The uterus, ovaries and bladder are normal. There is no pelvic adenopathy. There are no acute osseous  abnormalities.    IMPRESSION: No acute abdominal or pelvic process is    Mild fatty infiltration of the liver    Electronically signed by:  Vicky Moore MD  8/30/2022 4:46 PM CDT Workstation: [id number]                                     Medications   ondansetron disintegrating tablet 4 mg (4 mg Oral Given 8/30/22 1415)   morphine injection 4 mg (4 mg Intravenous Given 8/30/22 1605)   ondansetron injection 4 mg (4 mg Intravenous Given 8/30/22 1605)   iohexoL (OMNIPAQUE 350) injection 100 mL (100 mLs Intravenous Given 8/30/22 1634)     Medical Decision Making:   Clinical Tests:   Lab Tests: Ordered and Reviewed  The following lab test(s) were unremarkable: CBC, CMP and Lipase       <> Summary of Lab: Mag 2  Radiological Study: Ordered and Reviewed  ED Management:  42-year-old female with history of pancreatitis, gastric paresis, prior small bowel obstruction, inflammatory bowel disease, chronic constipation, prior right ovarian cyst removal, with multiple abdominal surgeries, MTHFR mutation with history of PE on eliquis, presents to the ER due to a constant aching but intermittent sharp right-sided abdominal pain radiating to the right flank since yesterday.  Reports 7/10 pain with nausea but no vomiting.  No diarrhea or constipation no blood in stool.  She also reports dysuria with frequency no urgency.  She has been having low-grade fevers.  No vaginal bleeding.  GI Md Is .   ON physical exam patient is able to ambulate to the room she has tenderness in the right upper quadrant right mid abdomen as well as right flank.  Negative Villatoro sign.  No right lower quadrant tenderness.  Patient is status post appendectomy.  She has not had gallbladder removal.  Normal bowel sounds.  Normal cardiac and lung exam.  Stable vital signs with normal blood pressure.  Patient already had a urinalysis performed that revealed hazy urine with trace protein and trace ketones no signs infection no blood.  UPT was  negative.  Due to her tenderness and her multiple abdominal surgeries as well as GI history with inflammatory bowel disease and low-grade fevers she was brought to a room and had lab work drawn.  She also had a CT scan the abdomen pelvis ordered and was given 4 morphine and 4 Zofran for pain and nausea.  Labs returned and revealed normal CBC CMP lipase and magnesium.  Patient reports her pain and nausea are improved.  She had CT scan the abdomen pelvis which revealed no acute abnormalities ultrasound of right upper quadrant was performed to evaluate further for gallbladder pathology and revealed a questionable nonobstructing right renal calculus but a normal gallbladder with normal common bile duct.  Patient be discharged home she is going to called GI physician tomorrow.  She will be discharged home on Bentyl and Zofran.  No acute life-threatening or severe cause of her abdominal pain has been found today she will need further evaluation with her GI physician for her abdominal pain she understands to return to the ER for any new or worsening symptoms  Zeenat Ann M.D.  10:06 PM 8/30/2022                      Clinical Impression:   Final diagnoses:  [R10.11] Right upper quadrant abdominal pain (Primary)  [R50.9] Fever, unspecified fever cause  [R11.0] Nausea        ED Disposition Condition    Discharge Stable          ED Prescriptions       Medication Sig Dispense Start Date End Date Auth. Provider    ondansetron (ZOFRAN-ODT) 8 MG TbDL Take 1 tablet (8 mg total) by mouth every 8 (eight) hours as needed. 15 tablet 8/30/2022 -- Zeenat Ann MD    dicyclomine (BENTYL) 20 mg tablet Take 1 tablet (20 mg total) by mouth 2 (two) times daily. for 10 days 20 tablet 8/30/2022 9/9/2022 Zeenat Ann MD          Follow-up Information       Follow up With Specialties Details Why Contact Info Additional Information    Dr Greenfield, Gastroenterology  Call in 1 day To discuss your abdominal pain nausea and  low-grade fever      [facility] - Emergency Dept Emergency Medicine Go to  If symptoms worsen [address]  [facility] [id number] [phone] 1st floor             Zeenat Ann MD  08/30/22 4633

## 2022-09-19 ENCOUNTER — TELEPHONE (OUTPATIENT)
Dept: HEMATOLOGY/ONCOLOGY | Facility: CLINIC | Age: 42
End: 2022-09-19

## 2022-09-28 PROBLEM — L91.0 KELOID SCAR: Status: ACTIVE | Noted: 2022-09-28

## 2022-09-28 PROBLEM — L76.82 INCISIONAL PAIN: Status: ACTIVE | Noted: 2022-09-28

## 2022-10-28 DIAGNOSIS — K21.00 REFLUX ESOPHAGITIS: ICD-10-CM

## 2022-10-28 DIAGNOSIS — K31.84 GASTROPARESIS: Primary | ICD-10-CM

## 2022-11-14 ENCOUNTER — HOSPITAL ENCOUNTER (OUTPATIENT)
Dept: RADIOLOGY | Facility: HOSPITAL | Age: 42
Discharge: HOME | End: 2022-11-14
Attending: SURGERY
Payer: MEDICAID

## 2022-11-14 DIAGNOSIS — K21.00 REFLUX ESOPHAGITIS: ICD-10-CM

## 2022-11-14 DIAGNOSIS — K31.84 GASTROPARESIS: ICD-10-CM

## 2022-11-14 PROCEDURE — 74240 X-RAY XM UPR GI TRC 1CNTRST: CPT | Mod: TC

## 2022-12-02 DIAGNOSIS — Z91.81 PERSONAL HISTORY OF FALL: Primary | ICD-10-CM

## 2022-12-02 DIAGNOSIS — R20.2 PARESTHESIA: ICD-10-CM

## 2022-12-02 DIAGNOSIS — H57.11 PAIN IN RIGHT EYE: ICD-10-CM

## 2022-12-02 DIAGNOSIS — R42 DIZZINESS AND GIDDINESS: ICD-10-CM

## 2022-12-02 DIAGNOSIS — R53.82 CHRONIC FATIGUE: ICD-10-CM

## 2022-12-08 ENCOUNTER — TELEPHONE (OUTPATIENT)
Dept: HEMATOLOGY/ONCOLOGY | Facility: CLINIC | Age: 42
End: 2022-12-08

## 2022-12-19 ENCOUNTER — HOSPITAL ENCOUNTER (OUTPATIENT)
Dept: RADIOLOGY | Facility: HOSPITAL | Age: 42
Discharge: HOME | End: 2022-12-19
Attending: NURSE PRACTITIONER
Payer: MEDICAID

## 2022-12-19 DIAGNOSIS — Z91.81 PERSONAL HISTORY OF FALL: ICD-10-CM

## 2022-12-19 DIAGNOSIS — R53.82 CHRONIC FATIGUE: ICD-10-CM

## 2022-12-19 DIAGNOSIS — R42 DIZZINESS AND GIDDINESS: ICD-10-CM

## 2022-12-19 DIAGNOSIS — H57.11 PAIN IN RIGHT EYE: ICD-10-CM

## 2022-12-19 DIAGNOSIS — R20.2 PARESTHESIA: ICD-10-CM

## 2022-12-19 PROCEDURE — A9585 GADOBUTROL INJECTION: HCPCS | Mod: PO | Performed by: NURSE PRACTITIONER

## 2022-12-19 PROCEDURE — 70553 MRI BRAIN STEM W/O & W/DYE: CPT | Mod: TC,PO

## 2022-12-19 PROCEDURE — 25500020 PHARM REV CODE 255: Mod: PO | Performed by: NURSE PRACTITIONER

## 2022-12-19 RX ORDER — GADOBUTROL 604.72 MG/ML
5 INJECTION INTRAVENOUS
Status: COMPLETED | OUTPATIENT
Start: 2022-12-19 | End: 2022-12-19

## 2022-12-19 RX ADMIN — GADOBUTROL 5 ML: 604.72 INJECTION INTRAVENOUS at 02:12

## 2023-01-17 NOTE — PROGRESS NOTES
[facility] Hematology/Oncology  PROGRESS NOTE      Subjective:       Patient ID:   NAME: Janeht Siddiqi : 1980     42 y.o. female    Referring Doc: Lizy Diamond  Other Physicians: Jere Noriega; Shazia; Az/Pearl    Chief Complaint:  Anemia/b12 f/u    History of Present Illness:     Patient returns today for a regularly scheduled follow-up visit.  The patient is here with her mom.     She is on eliquis.     No CP, SOB, HA's or N/V. Occasional dizziness and she has been seeing neurology      She had hernia repair and adhesion removal in 2022          Discussed covid 19 precautions      ROS:   GEN: normal without any fever, night sweats or weight loss  HEENT: normal with no HA's, sore throat, stiff neck, changes in vision  CV: normal with no CP, SOB, PND, KRAUSE or orthopnea  PULM: normal with no SOB, cough, hemoptysis, sputum or pleuritic pain  GI: intermittent abdominal discomfort but better since having surgery   : normal with no hematuria, dysuria  BREAST: normal with no mass, discharge, pain  SKIN: normal with no rash, erythema, bruising, or swelling    Allergies:  Review of patient's allergies indicates:   Allergen Reactions    Iron analogues Anaphylaxis     Infusion only. Tolerates po    Bactrim [sulfamethoxazole-trimethoprim] Swelling    Lidocaine Swelling    Nsaids (non-steroidal anti-inflammatory drug)      cannot take NSAIDs- upsets the stomach    Zofran [ondansetron hcl (pf)] Nausea Only       Medications:    Current Outpatient Medications:     acetaminophen (TYLENOL) 325 MG tablet, Take 650 mg by mouth every 6 (six) hours as needed for Pain., Disp: , Rfl:     apixaban (ELIQUIS) 5 mg Tab, Take 1 tablet (5 mg total) by mouth 2 (two) times daily., Disp: 60 tablet, Rfl: 3    ashwagandha root extract 300 mg Cap, Take 300 mg by mouth Daily., Disp: , Rfl:     clindamycin (CLEOCIN T) 1 % lotion, Apply topically as needed., Disp: , Rfl:     famotidine (PEPCID) 20 MG tablet, Take 1 tablet  (20 mg total) by mouth 2 (two) times daily., Disp: 20 tablet, Rfl: 0    gabapentin (NEURONTIN) 300 MG capsule, Take 300 mg by mouth every evening., Disp: , Rfl:     HYDROcodone-acetaminophen (NORCO) 5-325 mg per tablet, Take 1 tablet by mouth every 12 (twelve) hours as needed., Disp: , Rfl:     hydrocortisone 2.5 % cream, Apply topically 2 (two) times daily as needed., Disp: , Rfl:     hydrOXYzine (ATARAX) 50 MG tablet, Take 50 mg by mouth nightly as needed., Disp: , Rfl:     ibuprofen (ADVIL,MOTRIN) 800 MG tablet, Take 1 tablet (800 mg total) by mouth 3 (three) times daily., Disp: 40 tablet, Rfl: 2    JUNEL FE 1/20, 28, 1 mg-20 mcg (21)/75 mg (7) per tablet, Take 1 tablet by mouth nightly., Disp: , Rfl:     LINZESS 290 mcg Cap, Take 290 mcg by mouth once daily. , Disp: , Rfl: 1    multivitamin capsule, Take 1 capsule by mouth nightly., Disp: , Rfl:     ondansetron (ZOFRAN) 4 MG tablet, Take 1 tablet (4 mg total) by mouth 2 (two) times daily., Disp: 30 tablet, Rfl: 1    ondansetron (ZOFRAN-ODT) 8 MG TbDL, Take 1 tablet (8 mg total) by mouth every 8 (eight) hours as needed., Disp: 15 tablet, Rfl: 0    oxyCODONE-acetaminophen (PERCOCET) 5-325 mg per tablet, Take 1 tablet by mouth every 4 (four) hours as needed for Pain., Disp: 25 tablet, Rfl: 0    pantoprazole (PROTONIX) 40 MG tablet, Take 40 mg by mouth once daily., Disp: , Rfl:     topiramate (TOPAMAX) 200 MG Tab, Take 200 mg by mouth once daily., Disp: , Rfl:     traMADoL (ULTRAM) 50 mg tablet, Take 50 mg by mouth every 6 (six) hours as needed., Disp: , Rfl:     traZODone (DESYREL) 150 MG tablet, Take 300 mg by mouth nightly., Disp: , Rfl:     calcium-vitamin D3 (OYSTER SHELL CALCIUM-VIT D3) 500 mg(1,250mg) -200 unit per tablet, Take 1 tablet by mouth 2 (two) times daily., Disp: 180 tablet, Rfl: 3  No current facility-administered medications for this visit.    Facility-Administered Medications Ordered in Other Visits:     diphenhydrAMINE injection 25 mg, 25 mg,  "Intravenous, Q6H PRN, Aric Driver MD    HYDROmorphone injection 0.5 mg, 0.5 mg, Intravenous, Q5 Min PRN, Aric Driver MD, 0.5 mg at 09/28/22 0958    ondansetron injection 4 mg, 4 mg, Intravenous, Daily PRN, Aric Driver MD    prochlorperazine injection Soln 5 mg, 5 mg, Intravenous, Q30 Min PRN, Aric Driver MD    sodium chloride 0.9% bolus 250 mL, 250 mL, Intravenous, Once, Aric Driver MD    PMHx/PSHx Updates:  See patient's last visit with me on 6/20/2022  See H&P on 12/7/16        Pathology:  none          Objective:     Vitals:  Blood pressure 101/69, pulse 72, temperature 97.3 °F (36.3 °C), resp. rate 16, height 5' 4" (1.626 m), weight 49.9 kg (110 lb 0.2 oz).    Physical Examination:   GEN: no apparent distress, comfortable; AAOx3  HEAD: atraumatic and normocephalic  EYES: no pallor, no icterus, PERRLA  ENT: OMM, no pharyngeal erythema, external ears WNL; no nasal discharge; no thrush  NECK: no masses, thyroid normal, trachea midline, no LAD/LN's, supple  CV: RRR with no murmur; normal pulse; normal S1 and S2; no pedal edema  CHEST: Normal respiratory effort; CTAB; normal breath sounds; no wheeze or crackles  ABDOM:  nondistended; soft; normal bowel sounds; no rebound/guarding;   MUSC/Skeletal: ROM normal; no crepitus; joints normal; no deformities or arthropathy  EXTREM: no clubbing, cyanosis, inflammation or swelling  SKIN: no rashes, lesions, ulcers, petechiae or subcutaneous nodules  : no reza  NEURO: grossly intact; motor/sensory WNL; AAOx3; no tremors  PSYCH: normal mood, affect and behavior  LYMPH: normal cervical, supraclavicular, axillary and groin LN's            Labs:               Lab Results   Component Value Date    WBC 4.1 12/01/2022    HGB 14.3 12/01/2022    HCT 44.3 12/01/2022    MCV 91.9 12/01/2022     12/01/2022     BMP  Lab Results   Component Value Date     12/01/2022    K 4.2 12/01/2022     12/01/2022    CO2 24 12/01/2022    BUN 12 " 12/01/2022    CREATININE 0.91 12/01/2022    CALCIUM 9.0 12/01/2022    ANIONGAP 6 (L) 11/15/2022    ESTGFRAFRICA >60.0 07/20/2022    EGFRNONAA >60.0 07/20/2022      Lab Results   Component Value Date    IRON 141 12/01/2022    TIBC 242 (L) 12/01/2022    FERRITIN 45 12/01/2022     Homocysteine, Cardiovascular <10.4 umol/L 10.1       Lab Results   Component Value Date    YTTNXFRJ34 384 01/16/2023     Lab Results   Component Value Date    FOLATE 4.6 (L) 01/16/2023             Radiology/Diagnostic Studies:    X-ray Abdomen Flat And Erect    Result Date: 7/21/2017  EXAM: KUB. INDICATION: Obstruction. COMPARISON: KUB 1/1/14. FINDINGS: Supine and upright radiographs of the abdomen demonstrate a nonobstructive bowel gas pattern. There is no free air. There is no pneumatosis. There is no portal venous gas. There is calcification. Lung bases are clear. There is no osseous abnormality.    1.Nonobstructed bowel gas pattern. Electronically signed by: KELBY DENNIS MD Date:     07/21/17 Time:    13:49     Ct Abdomen Pelvis With Contrast    Result Date: 7/21/2017  Procedure: CT of the abdomen and pelvis with IV contrast. Technique: Axial images of the abdomen and pelvis were obtained with intravenous contrast administration. Coronal and sagittal reconstructions were provided. Total DLP was 691 mGy-cm.  Dose lowering technique, automated exposure control, was utilized for this exam. History: Left-sided abdominal pain and vomiting. Comparison: CT of the abdomen and pelvis 7/31/2015. Findings: The bilateral lung bases are clear. The heart is normal in size. There is a 7 mm focus of arterial enhancement within segment 4B of the liver which is isodense to the adjacent liver on delayed phase. This most consistent with a flash filling hemangioma. The portal vein is patent. The spleen, pancreas, and adrenal glands are normal. Bilateral kidneys are normal. There is no hydronephrosis or nephrolithiasis. The stomach and small bowel are  decompressed. The appendix is not visualized, however there is no right lower quadrant inflammatory stranding. The colon is normal. The uterus and adnexa are normal for age. Urinary bladder is normal. There is trace physiologic free fluid in the lower pelvis. There is no pelvic or retroperitoneal adenopathy. The abdominal aorta is non-aneurysmal. There is no lytic or blastic osseous lesions.     No acute abnormality of the abdomen and pelvis. Electronically signed by: KELBY DENNIS MD Date:     07/21/17 Time:    15:37       I have reviewed all available lab results and radiology reports.    Assessment/Plan:   (1) 42 y.o. female with diagnosis of iron deficiency anemia and B12 deficiency  - she was previously on oral iron   - latest ferritin is 124 and much better  - B12 adeqaute  - hgb is 14.0 and WNL and stable    2/28/2022:  - latest hgb adequate at 13.2    6/20/2022:  - no recent CB[facility] labs since Feb 2022 1/18/2023:  - latest CBC adequate  - iron panel adequate  - continued on oral iron  - B12 at 384; folate a little low  - add folic acid po daily and recommend continue B12 monthly    (2) prior Pulmonary emboli with prior use of OCP's  - on oral anticoagulant drug Eliquis  - underlying MTHFR-A heterozygous and ATIII deficiency issues  - followed by Dr Mccray with Pulm and he ordered several tests and  workup for cystic fibrosis which was negative    2/28/2022:  - continued on eliquis  - no excessive bleeding or brusiing    6/20/2022:  - continued on eliquis  - no excessive bleeding or bruising    1/18/2023:  - she is continued on eliquis  - no excessive bleeding or bruising    (3) Crohn's-like disorder/pancreatitis - followed by Dr Noriega with GI in past and now Dr Kelby Sinclair with GI at [facility]    (4) Chronic GYN issues with prior bleeding (resolved) - followed by Dr Gigi Oquendo with GYN    2/28/2022:  - She had surgery on right ovary in mid-Dec 2021; she has had some abdominal discomfort and occasional nausea  since that time. Dr Willis did the surgery. They suspect she is having a lot of scar tissue issues. She has been on antibiotics.  - She is seeing Dr Greenfield with GI and is seeing him again in 2 wees.     6/20/2022:  - She previously had surgery on right ovary in mid-Dec 2021; she has had some residual stomach issues. She is planning to have another surgery in the near future with Dr Perez and Dr Willis     1/18/2023:  - she had surgery in Sept 2022 with adhesions and hernia repair    (5) prior Vit D deficiency    (6) She saw Dr Khoobehi with plastics in [place] and had a Voluma skin filler without any difficulties     (7) SVT - seeing Dr Bernardo  and now on medications      1. Other iron deficiency anemia        2. Antithrombin III deficiency        3. Other pulmonary embolism without acute cor pulmonale, unspecified chronicity        4. B12 deficiency        5. MTHFR mutation            PLAN:  1. Check labs every 3 months ; continue eliquis; folic acid po daily and b12 monthly  2. F/u with PCP , GYN, and pulm  3. F/u with Dr Greenfield with [facility] as directed  4. F/u with GYN with Dr Oquendo  5. hold her eliquis 2-3 days prior to any surgery; she is considered a higher risk than the normal population for clot events     RTC  in 6 months    Fax note to Jere Mccray, Khoobehi, Lizy Diamond, Yahir; Peter Kang    Discussion:       COVID-19 Discussion:    I had long discussion with patient and any applicable family about the COVID-19 coronavirus epidemic and the recommended precautions with regard to cancer and/or hematology patients. I have re-iterated the CDC recommendations for adequate hand washing, use of hand -like products, and coughing into elbow, etc. In addition, especially for our patients who are on chemotherapy and/or our otherwise immunocompromised patients, I have recommended avoidance of crowds, including movie theaters, restaurants, churches, etc. I have  recommended avoidance of any sick or symptomatic family members and/or friends. I have also recommended avoidance of any raw and unwashed food products, and general avoidance of food items that have not been prepared by themselves. The patient has been asked to call us immediately with any symptom developments, issues, questions or other general concerns.     I have explained all of the above in detail and the patient understands all of the current recommendation(s). I have answered all of their questions to the best of my ability and to their complete satisfaction.   The patient is to continue with the current management plan.            Electronically signed by Miguel Hurst MD

## 2023-01-18 ENCOUNTER — OFFICE VISIT (OUTPATIENT)
Dept: HEMATOLOGY/ONCOLOGY | Facility: CLINIC | Age: 43
End: 2023-01-18
Payer: MEDICAID

## 2023-01-18 VITALS
BODY MASS INDEX: 18.78 KG/M2 | TEMPERATURE: 97 F | HEIGHT: 64 IN | DIASTOLIC BLOOD PRESSURE: 69 MMHG | RESPIRATION RATE: 16 BRPM | WEIGHT: 110 LBS | SYSTOLIC BLOOD PRESSURE: 101 MMHG | HEART RATE: 72 BPM

## 2023-01-18 DIAGNOSIS — E53.8 B12 DEFICIENCY: Primary | ICD-10-CM

## 2023-01-18 DIAGNOSIS — I26.99 OTHER PULMONARY EMBOLISM WITHOUT ACUTE COR PULMONALE, UNSPECIFIED CHRONICITY: ICD-10-CM

## 2023-01-18 DIAGNOSIS — E53.8 B12 DEFICIENCY: ICD-10-CM

## 2023-01-18 DIAGNOSIS — Z15.89 MTHFR MUTATION: ICD-10-CM

## 2023-01-18 DIAGNOSIS — D50.8 OTHER IRON DEFICIENCY ANEMIA: Primary | ICD-10-CM

## 2023-01-18 DIAGNOSIS — D68.59 ANTITHROMBIN III DEFICIENCY: ICD-10-CM

## 2023-01-18 PROCEDURE — 1160F RVW MEDS BY RX/DR IN RCRD: CPT | Mod: CPTII,S$GLB,, | Performed by: INTERNAL MEDICINE

## 2023-01-18 PROCEDURE — 3008F PR BODY MASS INDEX (BMI) DOCUMENTED: ICD-10-PCS | Mod: CPTII,S$GLB,, | Performed by: INTERNAL MEDICINE

## 2023-01-18 PROCEDURE — 99213 OFFICE O/P EST LOW 20 MIN: CPT | Mod: S$GLB,,, | Performed by: INTERNAL MEDICINE

## 2023-01-18 PROCEDURE — 3008F BODY MASS INDEX DOCD: CPT | Mod: CPTII,S$GLB,, | Performed by: INTERNAL MEDICINE

## 2023-01-18 PROCEDURE — 3074F SYST BP LT 130 MM HG: CPT | Mod: CPTII,S$GLB,, | Performed by: INTERNAL MEDICINE

## 2023-01-18 PROCEDURE — 1159F MED LIST DOCD IN RCRD: CPT | Mod: CPTII,S$GLB,, | Performed by: INTERNAL MEDICINE

## 2023-01-18 PROCEDURE — 3078F PR MOST RECENT DIASTOLIC BLOOD PRESSURE < 80 MM HG: ICD-10-PCS | Mod: CPTII,S$GLB,, | Performed by: INTERNAL MEDICINE

## 2023-01-18 PROCEDURE — 1160F PR REVIEW ALL MEDS BY PRESCRIBER/CLIN PHARMACIST DOCUMENTED: ICD-10-PCS | Mod: CPTII,S$GLB,, | Performed by: INTERNAL MEDICINE

## 2023-01-18 PROCEDURE — 3078F DIAST BP <80 MM HG: CPT | Mod: CPTII,S$GLB,, | Performed by: INTERNAL MEDICINE

## 2023-01-18 PROCEDURE — 99213 PR OFFICE/OUTPT VISIT, EST, LEVL III, 20-29 MIN: ICD-10-PCS | Mod: S$GLB,,, | Performed by: INTERNAL MEDICINE

## 2023-01-18 PROCEDURE — 3074F PR MOST RECENT SYSTOLIC BLOOD PRESSURE < 130 MM HG: ICD-10-PCS | Mod: CPTII,S$GLB,, | Performed by: INTERNAL MEDICINE

## 2023-01-18 PROCEDURE — 1159F PR MEDICATION LIST DOCUMENTED IN MEDICAL RECORD: ICD-10-PCS | Mod: CPTII,S$GLB,, | Performed by: INTERNAL MEDICINE

## 2023-01-18 RX ORDER — CYANOCOBALAMIN 1000 UG/ML
1000 INJECTION, SOLUTION INTRAMUSCULAR; SUBCUTANEOUS
Qty: 3 ML | Refills: 3 | Status: ON HOLD | OUTPATIENT
Start: 2023-01-18 | End: 2023-04-25

## 2023-01-18 RX ORDER — FOLIC ACID 1 MG/1
1 TABLET ORAL DAILY
Qty: 30 TABLET | Refills: 11 | Status: ON HOLD | OUTPATIENT
Start: 2023-01-18 | End: 2023-04-25

## 2023-01-20 ENCOUNTER — TELEPHONE (OUTPATIENT)
Dept: HEMATOLOGY/ONCOLOGY | Facility: CLINIC | Age: 43
End: 2023-01-20

## 2023-01-20 NOTE — TELEPHONE ENCOUNTER
----- Message from Dorota Chiang sent at 1/20/2023  9:16 AM CST -----  Pt and her mom Heike were in the office on Wed. Pt needs a refill on Eloquis.  [phone]

## 2023-01-20 NOTE — TELEPHONE ENCOUNTER
Spoke to patient and informed that eliquis script sent to pharmacy on 1/18 with refills. States last she spoke to them they did not receive it. Instructed to call to see if have it and if not let me know and we will resend. Verbalized understanding.

## 2023-03-01 ENCOUNTER — HOSPITAL ENCOUNTER (EMERGENCY)
Facility: HOSPITAL | Age: 43
Discharge: HOME | End: 2023-03-01
Attending: EMERGENCY MEDICINE
Payer: MEDICAID

## 2023-03-01 ENCOUNTER — TELEPHONE (OUTPATIENT)
Dept: HEMATOLOGY/ONCOLOGY | Facility: CLINIC | Age: 43
End: 2023-03-01

## 2023-03-01 VITALS
TEMPERATURE: 98 F | HEART RATE: 76 BPM | OXYGEN SATURATION: 98 % | SYSTOLIC BLOOD PRESSURE: 128 MMHG | RESPIRATION RATE: 16 BRPM | DIASTOLIC BLOOD PRESSURE: 90 MMHG | BODY MASS INDEX: 18.82 KG/M2 | HEIGHT: 64 IN | WEIGHT: 110.25 LBS

## 2023-03-01 DIAGNOSIS — R19.7 DIARRHEA, UNSPECIFIED TYPE: ICD-10-CM

## 2023-03-01 DIAGNOSIS — Z53.21 ELOPED FROM EMERGENCY DEPARTMENT: Primary | ICD-10-CM

## 2023-03-01 DIAGNOSIS — R42 DIZZINESS: ICD-10-CM

## 2023-03-01 DIAGNOSIS — R07.9 CHEST PAIN: ICD-10-CM

## 2023-03-01 LAB
ALBUMIN SERPL BCP-MCNC: 3.9 G/DL (ref 3.5–5.2)
ALP SERPL-CCNC: 49 U/L (ref 55–135)
ALT SERPL W/O P-5'-P-CCNC: 8 U/L (ref 10–44)
ANION GAP SERPL CALC-SCNC: 7 MMOL/L (ref 8–16)
AST SERPL-CCNC: 15 U/L (ref 10–40)
BASOPHILS # BLD AUTO: 0.04 K/UL (ref 0–0.2)
BASOPHILS NFR BLD: 0.9 % (ref 0–1.9)
BILIRUB SERPL-MCNC: 0.6 MG/DL (ref 0.1–1)
BNP SERPL-MCNC: 48 PG/ML (ref 0–99)
BUN SERPL-MCNC: 8 MG/DL (ref 6–20)
CALCIUM SERPL-MCNC: 8.7 MG/DL (ref 8.7–10.5)
CHLORIDE SERPL-SCNC: 107 MMOL/L (ref 95–110)
CO2 SERPL-SCNC: 24 MMOL/L (ref 23–29)
CREAT SERPL-MCNC: 0.7 MG/DL (ref 0.5–1.4)
DIFFERENTIAL METHOD: NORMAL
EOSINOPHIL # BLD AUTO: 0.1 K/UL (ref 0–0.5)
EOSINOPHIL NFR BLD: 1.7 % (ref 0–8)
ERYTHROCYTE [DISTWIDTH] IN BLOOD BY AUTOMATED COUNT: 14.5 % (ref 11.5–14.5)
EST. GFR  (NO RACE VARIABLE): >60 ML/MIN/1.73 M^2
GLUCOSE SERPL-MCNC: 85 MG/DL (ref 70–110)
HCT VFR BLD AUTO: 41 % (ref 37–48.5)
HGB BLD-MCNC: 13.5 G/DL (ref 12–16)
IMM GRANULOCYTES # BLD AUTO: 0.01 K/UL (ref 0–0.04)
IMM GRANULOCYTES NFR BLD AUTO: 0.2 % (ref 0–0.5)
INR PPP: 1 (ref 0.8–1.2)
LIPASE SERPL-CCNC: 62 U/L (ref 4–60)
LYMPHOCYTES # BLD AUTO: 2.1 K/UL (ref 1–4.8)
LYMPHOCYTES NFR BLD: 45.3 % (ref 18–48)
MAGNESIUM SERPL-MCNC: 2 MG/DL (ref 1.6–2.6)
MAGNESIUM SERPL-MCNC: 2 MG/DL (ref 1.6–2.6)
MCH RBC QN AUTO: 29.8 PG (ref 27–31)
MCHC RBC AUTO-ENTMCNC: 32.9 G/DL (ref 32–36)
MCV RBC AUTO: 91 FL (ref 82–98)
MONOCYTES # BLD AUTO: 0.5 K/UL (ref 0.3–1)
MONOCYTES NFR BLD: 10.4 % (ref 4–15)
NEUTROPHILS # BLD AUTO: 2 K/UL (ref 1.8–7.7)
NEUTROPHILS NFR BLD: 41.5 % (ref 38–73)
NRBC BLD-RTO: 0 /100 WBC
OB PNL STL: NEGATIVE
PLATELET # BLD AUTO: 248 K/UL (ref 150–450)
PMV BLD AUTO: 9.6 FL (ref 9.2–12.9)
POTASSIUM SERPL-SCNC: 4 MMOL/L (ref 3.5–5.1)
PROT SERPL-MCNC: 7 G/DL (ref 6–8.4)
PROTHROMBIN TIME: 11 SEC (ref 9–12.5)
RBC # BLD AUTO: 4.53 M/UL (ref 4–5.4)
SODIUM SERPL-SCNC: 138 MMOL/L (ref 136–145)
TROPONIN I SERPL HS-MCNC: <2.3 PG/ML (ref 0–14.9)
WBC # BLD AUTO: 4.7 K/UL (ref 3.9–12.7)
WBC #/AREA STL HPF: ABNORMAL /[HPF]

## 2023-03-01 PROCEDURE — 85025 COMPLETE CBC W/AUTO DIFF WBC: CPT | Performed by: EMERGENCY MEDICINE

## 2023-03-01 PROCEDURE — 89055 LEUKOCYTE ASSESSMENT FECAL: CPT | Performed by: EMERGENCY MEDICINE

## 2023-03-01 PROCEDURE — 93010 EKG 12-LEAD: ICD-10-PCS | Mod: ,,, | Performed by: GENERAL PRACTICE

## 2023-03-01 PROCEDURE — 82272 OCCULT BLD FECES 1-3 TESTS: CPT | Performed by: EMERGENCY MEDICINE

## 2023-03-01 PROCEDURE — 83735 ASSAY OF MAGNESIUM: CPT | Performed by: EMERGENCY MEDICINE

## 2023-03-01 PROCEDURE — 87046 STOOL CULTR AEROBIC BACT EA: CPT | Performed by: EMERGENCY MEDICINE

## 2023-03-01 PROCEDURE — 93005 ELECTROCARDIOGRAM TRACING: CPT | Performed by: GENERAL PRACTICE

## 2023-03-01 PROCEDURE — 83690 ASSAY OF LIPASE: CPT | Performed by: EMERGENCY MEDICINE

## 2023-03-01 PROCEDURE — 99999 HC NO LEVEL OF SERVICE - ED ONLY: CPT

## 2023-03-01 PROCEDURE — 80053 COMPREHEN METABOLIC PANEL: CPT | Performed by: EMERGENCY MEDICINE

## 2023-03-01 PROCEDURE — 85610 PROTHROMBIN TIME: CPT | Performed by: EMERGENCY MEDICINE

## 2023-03-01 PROCEDURE — 99284 EMERGENCY DEPT VISIT MOD MDM: CPT | Mod: 25

## 2023-03-01 PROCEDURE — 84484 ASSAY OF TROPONIN QUANT: CPT | Performed by: EMERGENCY MEDICINE

## 2023-03-01 PROCEDURE — 87209 SMEAR COMPLEX STAIN: CPT | Performed by: EMERGENCY MEDICINE

## 2023-03-01 PROCEDURE — 87177 OVA AND PARASITES SMEARS: CPT | Performed by: EMERGENCY MEDICINE

## 2023-03-01 PROCEDURE — 93010 ELECTROCARDIOGRAM REPORT: CPT | Mod: ,,, | Performed by: GENERAL PRACTICE

## 2023-03-01 PROCEDURE — 83880 ASSAY OF NATRIURETIC PEPTIDE: CPT | Performed by: EMERGENCY MEDICINE

## 2023-03-01 PROCEDURE — 87045 FECES CULTURE AEROBIC BACT: CPT | Performed by: EMERGENCY MEDICINE

## 2023-03-01 NOTE — ED PROVIDER NOTES
Encounter Date: 3/1/2023       History     Chief Complaint   Patient presents with    Medication Reaction     Started taking folic acid 10 days ago, after starting this medication pt started having finger nail pain and tooth pain, also started having heavier cycle than normal with blood clots     43-year-old female presents emergency department for emergent evaluation of dizziness, with any movement.  Watery stools, reports heavier menstrual cycles are normal, states her teeth hurt, her nails hurt, patient reports that all the symptoms started 10 days ago from starting on folic acid.  Patient's last dose was this morning she states that she spoke with her hematologist Dr. Hurst and was instructed to come to the emergency department for further evaluation.  Patient does have a history of clotting disorder she is currently on Eliquis she denies any chest pain, pleuritic pain, or calf pain or swelling.    Review of patient's allergies indicates:   Allergen Reactions    Iron analogues Anaphylaxis     Infusion only. Tolerates po    Nsaids (non-steroidal anti-inflammatory drug)      cannot take NSAIDs- upsets the stomach     Past Medical History:   Diagnosis Date    Anticoagulant long-term use     Antithrombin III deficiency 08/15/2017    Anxiety     B12 deficiency 08/15/2017    Bipolar disorder     Constipation     Eating disorder     Encounter for blood transfusion     Gastroparesis     MTHFR mutation 08/15/2017    Pancreatitis     PONV (postoperative nausea and vomiting)     Pulmonary embolism     Seizures     AS A CHILD    Vitamin D deficiency 08/15/2017     Past Surgical History:   Procedure Laterality Date    APPENDECTOMY      bowel obstruction      x 2 COLON RESECTIONS    LAPAROSCOPIC SALPINGO-OOPHORECTOMY Right 12/15/2021    Procedure: SALPINGO-OOPHORECTOMY, LYSIS OF ADHESIONS ;  Surgeon: Gigi Oquendo MD;  Location: [facility];  Service: OB/GYN;  Laterality: Right;    LAPAROSCOPY N/A 12/15/2021    Procedure:  LAPAROSCOPY;  Surgeon: Gigi Oquendo MD;  Location[facility];  Service: OB/GYN;  Laterality: N/A;    lysis of adhesions      LYSIS OF ADHESIONS N/A 9/28/2022    Procedure: LYSIS, ADHESIONS;  Surgeon: Peter Perez MD;  Location: [facility];  Service: General;  Laterality: N/A;    OVARIAN CYST REMOVAL      REVISION OF SCAR N/A 9/28/2022    Procedure: LAPAROTOMY OF SCAR TISSUE REVISION;  Surgeon: Gigi Oquendo MD;  Location: [facility] OR;  Service: OB/GYN;  Laterality: N/A;    uterine polyp removal       Family History   Problem Relation Age of Onset    Cancer Mother         breast cancer    Hyperlipidemia Father     Arthritis Father     Cancer Maternal Grandmother         breast and lung    Colon cancer Maternal Grandmother 62    Prostate cancer Maternal Grandfather     Prostate cancer Paternal Grandfather      Social History     Tobacco Use    Smoking status: Never    Smokeless tobacco: Never   Substance Use Topics    Alcohol use: No    Drug use: No     Review of Systems   Constitutional: Negative.  Negative for chills and fever.   HENT:  Positive for dental problem.    Respiratory: Negative.  Negative for cough and shortness of breath.    Cardiovascular: Negative.    Gastrointestinal:  Positive for diarrhea.   Genitourinary:  Positive for vaginal bleeding.   Skin: Negative.    Neurological:  Positive for dizziness.   Hematological: Negative.    Psychiatric/Behavioral: Negative.     All other systems reviewed and are negative.    Physical Exam     Initial Vitals [03/01/23 1455]   BP Pulse Resp Temp SpO2   (!) 128/90 76 16 98.3 °F (36.8 °C) 98 %      MAP       --         Physical Exam    Constitutional: She appears well-developed and well-nourished.   HENT:   Head: Normocephalic and atraumatic.   Eyes: Conjunctivae and EOM are normal. Pupils are equal, round, and reactive to light.   Neck: Neck supple.   Normal range of motion.  Cardiovascular:  Normal rate, regular rhythm and normal heart sounds.            Pulmonary/Chest: Breath sounds normal. No respiratory distress.   Abdominal: Abdomen is soft. Bowel sounds are normal.   Diffuse abdominal tenderness   Musculoskeletal:         General: No tenderness or edema. Normal range of motion.      Cervical back: Normal range of motion and neck supple.     Neurological: She is alert and oriented to person, place, and time. She has normal strength.   Skin: Skin is warm and dry. No rash noted.   Psychiatric: She has a normal mood and affect.       ED Course   Procedures  Labs Reviewed   COMPREHENSIVE METABOLIC PANEL - Abnormal; Notable for the following components:       Result Value    Alkaline Phosphatase 49 (*)     ALT 8 (*)     Anion Gap 7 (*)     All other components within normal limits   LIPASE - Abnormal; Notable for the following components:    Lipase 62 (*)     All other components within normal limits   WBC, STOOL - Abnormal; Notable for the following components:    Stool WBC Occ neutrophils seen (*)     All other components within normal limits   CULTURE, STOOL   MAGNESIUM   CBC W/ AUTO DIFFERENTIAL   TROPONIN I HIGH SENSITIVITY   B-TYPE NATRIURETIC PEPTIDE   MAGNESIUM   PROTIME-INR   OCCULT BLOOD X 1, STOOL   STOOL EXAM-OVA,CYSTS,PARASITES   TROPONIN I HIGH SENSITIVITY        ECG Results              EKG 12-lead (Final result)  Result time 03/01/23 23:33:26      Final result by Interface, Lab In [facility] (03/01/23 23:33:26)                   Narrative:    Test Reason : R07.9,    Vent. Rate : 055 BPM     Atrial Rate : 055 BPM     P-R Int : 160 ms          QRS Dur : 074 ms      QT Int : 456 ms       P-R-T Axes : 055 057 056 degrees     QTc Int : 436 ms    Sinus bradycardia  Otherwise normal ECG  When compared with ECG of 20-JUL-2022 13:56,  Vent. rate has decreased BY  28 BPM  Nonspecific T wave abnormality no longer evident in Inferior leads  Nonspecific T wave abnormality, improved in Anterior leads  Confirmed by Timur Chaudhary MD ([id number]) on 3/1/2023 11:33:14  PM    Referred By: [email or web address]   SELF           Confirmed By:Timur Chaudhary MD                                  Imaging Results              X-Ray Chest PA And Lateral (Final result)  Result time 03/01/23 16:29:08   Procedure changed from X-Ray Chest AP Portable     Final result by Robert Sorto MD (03/01/23 16:29:08)                   Narrative:    Reason: Chest Pain    FINDINGS:    PA and lateral chest with comparison chest x-ray January 15, 2021 show normal cardiomediastinal silhouette.  Lungs are clear. Pulmonary vasculature is normal. No acute osseous abnormality.    IMPRESSION:    No acute cardiopulmonary abnormality.    Electronically signed by:  Robert Sorto DO  3/1/2023 4:29 PM CST Workstation: [id number]                                     Medications - No data to display  Medical Decision Making:   History:   Old Records Summarized: records from previous admission(s).  ED Management:  43-year-old female with multiple medical problems presents emergency department with multiple complaints that she states started 10 days ago when she started taking folic acid.  Patient states folic acid was prescribed by her hematologist Dr. Hurst she called him this morning and was sent here for further evaluation.  I did evaluate the patient and ordered blood work and fluids I was told by the nurse that the patient eloped the emergency department prior to all labs returning and or any further ER workup and or formal discharge.  According to the ER nurse she states that she will follow-up with Dr. Hurst according to the results                        Clinical Impression:   Final diagnoses:  [R07.9] Chest pain  [Z53.21] Eloped from emergency department (Primary)  [R42] Dizziness  [R19.7] Diarrhea, unspecified type        ED Disposition Condition    Eloped                 Christa Grimaldo [facility]  03/02/23 0024

## 2023-03-01 NOTE — TELEPHONE ENCOUNTER
Spoke to patient who verified that she is still taking the eliquis. Message reviewed with Dr. Hurst and per his verbal orders, I instructed patient to go the ER for evaluation. Patient inquired as to if it was ok for her to just go sit up in the ER for extended period of time as she is feeling weak. Informed patient that I am unsure of how long the wait may be in the ER, but that if she is that weak and having bleeding that she should go to the ER for evaluation and treatment. Verbalized understanding.

## 2023-03-01 NOTE — TELEPHONE ENCOUNTER
----- Message from Brooklyn Garay sent at 3/1/2023 10:03 AM CST -----  The patient said she started Folic Acid about 10 days ago and that she has not stopped bleeding since then. She said that her gums are also very sore. She is feeling weak. # [phone]

## 2023-03-07 DIAGNOSIS — E53.8 B12 DEFICIENCY: ICD-10-CM

## 2023-03-07 DIAGNOSIS — I26.99 OTHER PULMONARY EMBOLISM WITHOUT ACUTE COR PULMONALE, UNSPECIFIED CHRONICITY: ICD-10-CM

## 2023-03-07 DIAGNOSIS — Z15.89 MTHFR MUTATION: ICD-10-CM

## 2023-03-07 NOTE — TELEPHONE ENCOUNTER
Patient states she is not going to take the folic acid due to medication reaction.     She does want to continue the eliquis.

## 2023-03-07 NOTE — TELEPHONE ENCOUNTER
Do we want to continue her on this med? She called recently with c/o bleeding, we sent her to ER, but left prior to results of labs. Patient thought she was having reaction to folic acid that was added to her meds. She doesn't have f/u until July I believe. Can you please review and let me know if she needs sooner RTC etc? Please see the attached refill request.

## 2023-03-10 LAB
O+P SPEC MICRO: NORMAL
O+P STL CONC: NORMAL
STOOL CULTURE: NORMAL

## 2023-03-10 NOTE — PROGRESS NOTES
Over and parasites seen on reflux however WBC and stool reveals occasional neutrophils on review of the patient's chart she eloped the emergency department please ensure that she follows up with her physician, return for any concerns

## 2023-03-16 ENCOUNTER — TELEPHONE (OUTPATIENT)
Dept: HEMATOLOGY/ONCOLOGY | Facility: CLINIC | Age: 43
End: 2023-03-16

## 2023-03-16 DIAGNOSIS — R53.1 WEAKNESS: Primary | ICD-10-CM

## 2023-03-16 NOTE — TELEPHONE ENCOUNTER
Spoke to patient who states she stopped the folic acid and her bleeding has stopped. Patient states she has an ongoing issues with inflammatory bowel issues and diarrhea so she was recently having this too. Macy made aware and per her instruction I placed orders for labs and instructed patient to have her labs drawn. Patient requested to add a thyroid panel to her labs, per Dr. Hurst I informed her that we are not able to add this lab to her orders and that she should contact her PCP concerning having this lab done. Verbalized understanding. I also made patient aware of the 2 day turn around for lab results and instructed her to go to the ER if her symptoms worsen or she begins to develop SOB since it will be at least 2 days before we get the lab results back. Verbalized understanding.

## 2023-03-23 LAB
ALBUMIN SERPL-MCNC: 4.3 G/DL (ref 3.6–5.1)
ALBUMIN/GLOB SERPL: 1.7 (CALC) (ref 1–2.5)
ALP SERPL-CCNC: 54 U/L (ref 31–125)
ALT SERPL-CCNC: 6 U/L (ref 6–29)
AST SERPL-CCNC: 18 U/L (ref 10–30)
BASOPHILS # BLD AUTO: 52 CELLS/UL (ref 0–200)
BASOPHILS NFR BLD AUTO: 1.1 %
BILIRUB SERPL-MCNC: 0.5 MG/DL (ref 0.2–1.2)
BUN SERPL-MCNC: 11 MG/DL (ref 7–25)
BUN/CREAT SERPL: NORMAL (CALC) (ref 6–22)
CALCIUM SERPL-MCNC: 9 MG/DL (ref 8.6–10.2)
CHLORIDE SERPL-SCNC: 107 MMOL/L (ref 98–110)
CO2 SERPL-SCNC: 25 MMOL/L (ref 20–32)
CREAT SERPL-MCNC: 0.87 MG/DL (ref 0.5–0.99)
EGFR: 85 ML/MIN/1.73M2
EOSINOPHIL # BLD AUTO: 52 CELLS/UL (ref 15–500)
EOSINOPHIL NFR BLD AUTO: 1.1 %
ERYTHROCYTE [DISTWIDTH] IN BLOOD BY AUTOMATED COUNT: 11.8 % (ref 11–15)
GLOBULIN SER CALC-MCNC: 2.6 G/DL (CALC) (ref 1.9–3.7)
GLUCOSE SERPL-MCNC: 89 MG/DL (ref 65–139)
HCT VFR BLD AUTO: 40.6 % (ref 35–45)
HGB BLD-MCNC: 13.8 G/DL (ref 11.7–15.5)
LYMPHOCYTES # BLD AUTO: 1795 CELLS/UL (ref 850–3900)
LYMPHOCYTES NFR BLD AUTO: 38.2 %
MCH RBC QN AUTO: 30.3 PG (ref 27–33)
MCHC RBC AUTO-ENTMCNC: 34 G/DL (ref 32–36)
MCV RBC AUTO: 89 FL (ref 80–100)
MONOCYTES # BLD AUTO: 400 CELLS/UL (ref 200–950)
MONOCYTES NFR BLD AUTO: 8.5 %
NEUTROPHILS # BLD AUTO: 2402 CELLS/UL (ref 1500–7800)
NEUTROPHILS NFR BLD AUTO: 51.1 %
PLATELET # BLD AUTO: 243 THOUSAND/UL (ref 140–400)
PMV BLD REES-ECKER: 10 FL (ref 7.5–12.5)
POTASSIUM SERPL-SCNC: 4.2 MMOL/L (ref 3.5–5.3)
PROT SERPL-MCNC: 6.9 G/DL (ref 6.1–8.1)
RBC # BLD AUTO: 4.56 MILLION/UL (ref 3.8–5.1)
SODIUM SERPL-SCNC: 138 MMOL/L (ref 135–146)
WBC # BLD AUTO: 4.7 THOUSAND/UL (ref 3.8–10.8)

## 2023-04-25 ENCOUNTER — HOSPITAL ENCOUNTER (OUTPATIENT)
Facility: HOSPITAL | Age: 43
Discharge: HOME | End: 2023-04-26
Attending: EMERGENCY MEDICINE | Admitting: HOSPITALIST
Payer: MEDICAID

## 2023-04-25 DIAGNOSIS — G45.9 TIA (TRANSIENT ISCHEMIC ATTACK): ICD-10-CM

## 2023-04-25 DIAGNOSIS — E87.6 HYPOKALEMIA: Primary | ICD-10-CM

## 2023-04-25 PROBLEM — G43.909 MIGRAINE HEADACHE: Status: ACTIVE | Noted: 2023-04-25

## 2023-04-25 LAB
ALBUMIN SERPL BCP-MCNC: 4 G/DL (ref 3.5–5.2)
ALP SERPL-CCNC: 56 U/L (ref 55–135)
ALT SERPL W/O P-5'-P-CCNC: 16 U/L (ref 10–44)
ANION GAP SERPL CALC-SCNC: 11 MMOL/L (ref 8–16)
AST SERPL-CCNC: 18 U/L (ref 10–40)
BASOPHILS # BLD AUTO: 0.04 K/UL (ref 0–0.2)
BASOPHILS NFR BLD: 0.8 % (ref 0–1.9)
BILIRUB SERPL-MCNC: 0.3 MG/DL (ref 0.1–1)
BILIRUB UR QL STRIP: NEGATIVE
BUN SERPL-MCNC: 18 MG/DL (ref 6–20)
CALCIUM SERPL-MCNC: 9.2 MG/DL (ref 8.7–10.5)
CHLORIDE SERPL-SCNC: 108 MMOL/L (ref 95–110)
CHOLEST SERPL-MCNC: 245 MG/DL (ref 120–199)
CHOLEST/HDLC SERPL: 2.7 {RATIO} (ref 2–5)
CLARITY UR: CLEAR
CO2 SERPL-SCNC: 18 MMOL/L (ref 23–29)
COLOR UR: YELLOW
CREAT SERPL-MCNC: 1.2 MG/DL (ref 0.5–1.4)
DIFFERENTIAL METHOD: ABNORMAL
EOSINOPHIL # BLD AUTO: 0 K/UL (ref 0–0.5)
EOSINOPHIL NFR BLD: 0.6 % (ref 0–8)
ERYTHROCYTE [DISTWIDTH] IN BLOOD BY AUTOMATED COUNT: 14.1 % (ref 11.5–14.5)
EST. GFR  (NO RACE VARIABLE): 58 ML/MIN/1.73 M^2
ESTIMATED AVG GLUCOSE: 80 MG/DL (ref 68–131)
GLUCOSE SERPL-MCNC: 95 MG/DL (ref 70–110)
GLUCOSE UR QL STRIP: NEGATIVE
HBA1C MFR BLD: 4.4 % (ref 4–5.6)
HCT VFR BLD AUTO: 45.5 % (ref 37–48.5)
HDLC SERPL-MCNC: 91 MG/DL (ref 40–75)
HDLC SERPL: 37.1 % (ref 20–50)
HGB BLD-MCNC: 15.1 G/DL (ref 12–16)
HGB UR QL STRIP: NEGATIVE
IMM GRANULOCYTES # BLD AUTO: 0.01 K/UL (ref 0–0.04)
IMM GRANULOCYTES NFR BLD AUTO: 0.2 % (ref 0–0.5)
INR PPP: 1 (ref 0.8–1.2)
KETONES UR QL STRIP: NEGATIVE
LDLC SERPL CALC-MCNC: 146.4 MG/DL (ref 63–159)
LEUKOCYTE ESTERASE UR QL STRIP: NEGATIVE
LYMPHOCYTES # BLD AUTO: 2.3 K/UL (ref 1–4.8)
LYMPHOCYTES NFR BLD: 43.4 % (ref 18–48)
MCH RBC QN AUTO: 29.8 PG (ref 27–31)
MCHC RBC AUTO-ENTMCNC: 33.2 G/DL (ref 32–36)
MCV RBC AUTO: 90 FL (ref 82–98)
MONOCYTES # BLD AUTO: 0.5 K/UL (ref 0.3–1)
MONOCYTES NFR BLD: 9.3 % (ref 4–15)
NEUTROPHILS # BLD AUTO: 2.4 K/UL (ref 1.8–7.7)
NEUTROPHILS NFR BLD: 45.7 % (ref 38–73)
NITRITE UR QL STRIP: NEGATIVE
NONHDLC SERPL-MCNC: 154 MG/DL
NRBC BLD-RTO: 0 /100 WBC
PH UR STRIP: 6 [PH] (ref 5–8)
PLATELET # BLD AUTO: 239 K/UL (ref 150–450)
PMV BLD AUTO: 9.1 FL (ref 9.2–12.9)
POC PTINR: 1.3 (ref 0.9–1.2)
POCT GLUCOSE: 97 MG/DL (ref 70–110)
POTASSIUM SERPL-SCNC: 2.9 MMOL/L (ref 3.5–5.1)
PROT SERPL-MCNC: 7.7 G/DL (ref 6–8.4)
PROT UR QL STRIP: ABNORMAL
PROTHROMBIN TIME: 11.1 SEC (ref 9–12.5)
RBC # BLD AUTO: 5.07 M/UL (ref 4–5.4)
SAMPLE: ABNORMAL
SODIUM SERPL-SCNC: 137 MMOL/L (ref 136–145)
SP GR UR STRIP: 1 (ref 1–1.03)
TRIGL SERPL-MCNC: 38 MG/DL (ref 30–150)
TSH SERPL DL<=0.005 MIU/L-ACNC: 1.11 UIU/ML (ref 0.4–4)
URN SPEC COLLECT METH UR: ABNORMAL
UROBILINOGEN UR STRIP-ACNC: NEGATIVE EU/DL
WBC # BLD AUTO: 5.25 K/UL (ref 3.9–12.7)

## 2023-04-25 PROCEDURE — 99204 PR OFFICE/OUTPT VISIT, NEW, LEVL IV, 45-59 MIN: ICD-10-PCS | Mod: 95,,, | Performed by: PSYCHIATRY & NEUROLOGY

## 2023-04-25 PROCEDURE — 80053 COMPREHEN METABOLIC PANEL: CPT | Performed by: EMERGENCY MEDICINE

## 2023-04-25 PROCEDURE — 99285 EMERGENCY DEPT VISIT HI MDM: CPT | Mod: 25

## 2023-04-25 PROCEDURE — 84443 ASSAY THYROID STIM HORMONE: CPT | Performed by: EMERGENCY MEDICINE

## 2023-04-25 PROCEDURE — 85610 PROTHROMBIN TIME: CPT

## 2023-04-25 PROCEDURE — 25000003 PHARM REV CODE 250: Performed by: NURSE PRACTITIONER

## 2023-04-25 PROCEDURE — 25000003 PHARM REV CODE 250: Performed by: EMERGENCY MEDICINE

## 2023-04-25 PROCEDURE — 63600175 PHARM REV CODE 636 W HCPCS: Performed by: NURSE PRACTITIONER

## 2023-04-25 PROCEDURE — 82962 GLUCOSE BLOOD TEST: CPT

## 2023-04-25 PROCEDURE — 81003 URINALYSIS AUTO W/O SCOPE: CPT | Performed by: NURSE PRACTITIONER

## 2023-04-25 PROCEDURE — 80061 LIPID PANEL: CPT | Performed by: EMERGENCY MEDICINE

## 2023-04-25 PROCEDURE — 25500020 PHARM REV CODE 255

## 2023-04-25 PROCEDURE — 93010 EKG 12-LEAD: ICD-10-PCS | Mod: ,,, | Performed by: INTERNAL MEDICINE

## 2023-04-25 PROCEDURE — 85025 COMPLETE CBC W/AUTO DIFF WBC: CPT | Performed by: EMERGENCY MEDICINE

## 2023-04-25 PROCEDURE — G0378 HOSPITAL OBSERVATION PER HR: HCPCS

## 2023-04-25 PROCEDURE — 85610 PROTHROMBIN TIME: CPT | Performed by: EMERGENCY MEDICINE

## 2023-04-25 PROCEDURE — 86803 HEPATITIS C AB TEST: CPT | Performed by: EMERGENCY MEDICINE

## 2023-04-25 PROCEDURE — 63600175 PHARM REV CODE 636 W HCPCS: Performed by: EMERGENCY MEDICINE

## 2023-04-25 PROCEDURE — 83036 HEMOGLOBIN GLYCOSYLATED A1C: CPT | Performed by: NURSE PRACTITIONER

## 2023-04-25 PROCEDURE — 94760 N-INVAS EAR/PLS OXIMETRY 1: CPT

## 2023-04-25 PROCEDURE — 36415 COLL VENOUS BLD VENIPUNCTURE: CPT | Performed by: EMERGENCY MEDICINE

## 2023-04-25 PROCEDURE — 36415 COLL VENOUS BLD VENIPUNCTURE: CPT | Performed by: NURSE PRACTITIONER

## 2023-04-25 PROCEDURE — 93005 ELECTROCARDIOGRAM TRACING: CPT

## 2023-04-25 PROCEDURE — 99204 OFFICE O/P NEW MOD 45 MIN: CPT | Mod: 95,,, | Performed by: PSYCHIATRY & NEUROLOGY

## 2023-04-25 PROCEDURE — 96375 TX/PRO/DX INJ NEW DRUG ADDON: CPT

## 2023-04-25 PROCEDURE — 87389 HIV-1 AG W/HIV-1&-2 AB AG IA: CPT | Performed by: EMERGENCY MEDICINE

## 2023-04-25 PROCEDURE — 96365 THER/PROPH/DIAG IV INF INIT: CPT

## 2023-04-25 PROCEDURE — 93010 ELECTROCARDIOGRAM REPORT: CPT | Mod: ,,, | Performed by: INTERNAL MEDICINE

## 2023-04-25 RX ORDER — POTASSIUM CHLORIDE 20 MEQ/1
40 TABLET, EXTENDED RELEASE ORAL ONCE
Status: COMPLETED | OUTPATIENT
Start: 2023-04-25 | End: 2023-04-25

## 2023-04-25 RX ORDER — SODIUM CHLORIDE 0.9 % (FLUSH) 0.9 %
10 SYRINGE (ML) INJECTION
Status: DISCONTINUED | OUTPATIENT
Start: 2023-04-25 | End: 2023-04-26 | Stop reason: HOSPADM

## 2023-04-25 RX ORDER — FAMOTIDINE 20 MG/1
20 TABLET, FILM COATED ORAL DAILY
Status: DISCONTINUED | OUTPATIENT
Start: 2023-04-25 | End: 2023-04-26 | Stop reason: HOSPADM

## 2023-04-25 RX ORDER — FOLIC ACID 1 MG/1
1 TABLET ORAL DAILY
Status: DISCONTINUED | OUTPATIENT
Start: 2023-04-26 | End: 2023-04-25 | Stop reason: ALTCHOICE

## 2023-04-25 RX ORDER — LABETALOL HYDROCHLORIDE 5 MG/ML
10 INJECTION, SOLUTION INTRAVENOUS EVERY 6 HOURS PRN
Status: DISCONTINUED | OUTPATIENT
Start: 2023-04-25 | End: 2023-04-26 | Stop reason: HOSPADM

## 2023-04-25 RX ORDER — HYDROXYZINE HYDROCHLORIDE 25 MG/1
50 TABLET, FILM COATED ORAL NIGHTLY PRN
Status: DISCONTINUED | OUTPATIENT
Start: 2023-04-25 | End: 2023-04-26

## 2023-04-25 RX ORDER — TALC
6 POWDER (GRAM) TOPICAL NIGHTLY PRN
Status: DISCONTINUED | OUTPATIENT
Start: 2023-04-25 | End: 2023-04-26 | Stop reason: HOSPADM

## 2023-04-25 RX ORDER — ATORVASTATIN CALCIUM 40 MG/1
40 TABLET, FILM COATED ORAL DAILY
Status: DISCONTINUED | OUTPATIENT
Start: 2023-04-26 | End: 2023-04-26 | Stop reason: HOSPADM

## 2023-04-25 RX ORDER — METOCLOPRAMIDE HYDROCHLORIDE 5 MG/ML
5 INJECTION INTRAMUSCULAR; INTRAVENOUS EVERY 6 HOURS PRN
Status: DISCONTINUED | OUTPATIENT
Start: 2023-04-25 | End: 2023-04-26 | Stop reason: HOSPADM

## 2023-04-25 RX ORDER — TRAZODONE HYDROCHLORIDE 50 MG/1
300 TABLET ORAL NIGHTLY
Status: DISCONTINUED | OUTPATIENT
Start: 2023-04-25 | End: 2023-04-26 | Stop reason: HOSPADM

## 2023-04-25 RX ORDER — TOPIRAMATE 100 MG/1
200 TABLET, FILM COATED ORAL DAILY
Status: DISCONTINUED | OUTPATIENT
Start: 2023-04-26 | End: 2023-04-26 | Stop reason: HOSPADM

## 2023-04-25 RX ORDER — ONDANSETRON 2 MG/ML
4 INJECTION INTRAMUSCULAR; INTRAVENOUS EVERY 12 HOURS PRN
Status: DISCONTINUED | OUTPATIENT
Start: 2023-04-25 | End: 2023-04-26 | Stop reason: HOSPADM

## 2023-04-25 RX ORDER — ACETAMINOPHEN 325 MG/1
650 TABLET ORAL EVERY 6 HOURS PRN
Status: DISCONTINUED | OUTPATIENT
Start: 2023-04-25 | End: 2023-04-26 | Stop reason: HOSPADM

## 2023-04-25 RX ORDER — GABAPENTIN 300 MG/1
300 CAPSULE ORAL NIGHTLY
Status: DISCONTINUED | OUTPATIENT
Start: 2023-04-25 | End: 2023-04-26 | Stop reason: HOSPADM

## 2023-04-25 RX ORDER — POTASSIUM CHLORIDE 20 MEQ/1
40 TABLET, EXTENDED RELEASE ORAL
Status: COMPLETED | OUTPATIENT
Start: 2023-04-25 | End: 2023-04-25

## 2023-04-25 RX ORDER — POTASSIUM CHLORIDE 7.45 MG/ML
10 INJECTION INTRAVENOUS
Status: COMPLETED | OUTPATIENT
Start: 2023-04-25 | End: 2023-04-25

## 2023-04-25 RX ADMIN — IOHEXOL 75 ML: 350 INJECTION, SOLUTION INTRAVENOUS at 01:04

## 2023-04-25 RX ADMIN — POTASSIUM CHLORIDE 10 MEQ: 7.46 INJECTION, SOLUTION INTRAVENOUS at 03:04

## 2023-04-25 RX ADMIN — GABAPENTIN 300 MG: 300 CAPSULE ORAL at 11:04

## 2023-04-25 RX ADMIN — HYDROXYZINE HYDROCHLORIDE 50 MG: 25 TABLET, FILM COATED ORAL at 11:04

## 2023-04-25 RX ADMIN — APIXABAN 5 MG: 2.5 TABLET, FILM COATED ORAL at 09:04

## 2023-04-25 RX ADMIN — POTASSIUM CHLORIDE 40 MEQ: 1500 TABLET, EXTENDED RELEASE ORAL at 09:04

## 2023-04-25 RX ADMIN — POTASSIUM CHLORIDE 40 MEQ: 1500 TABLET, EXTENDED RELEASE ORAL at 03:04

## 2023-04-25 RX ADMIN — ONDANSETRON 4 MG: 2 INJECTION INTRAMUSCULAR; INTRAVENOUS at 07:04

## 2023-04-25 RX ADMIN — TRAZODONE HYDROCHLORIDE 300 MG: 50 TABLET ORAL at 11:04

## 2023-04-25 NOTE — NURSING
Nurses Note -- 4 Eyes      4/25/2023   5:45 PM      Skin assessed during: Admit      [x] No Altered Skin Integrity Present    []Prevention Measures Documented      [] Yes- Altered Skin Integrity Present or Discovered   [] LDA Added if Not in Epic (Describe Wound)   [] New Altered Skin Integrity was Present on Admit and Documented in LDA   [] Wound Image Taken    Wound Care Consulted? No    Attending Nurse:  Florencia Lin RN     Second RN/Staff Member:  leisa

## 2023-04-25 NOTE — ED PROVIDER NOTES
Encounter Date: 4/25/2023    SCRIBE #1 NOTE: I, Claire Godfrey, am scribing for, and in the presence of,  Ronald Flores MD.     History     Chief Complaint   Patient presents with    Fatigue    Aphasia     Patient reports having an episode of aphasia with a right side headache, states she had facial drooping that lasted approx 5 minutes and then resolved, states she feels weak and more tired than normal      Time seen by provider: 1:17 PM on 04/25/2023    Janeth Siddiqi is a 43 y.o. female who presents to the ED with an onset of slurred speech, right sided facial droop, general weakness, and headache that began this morning when she woke up. Patient states she was last normal yesterday. Her symptoms have currently resolved in the ED. The patient denies fever, cough, congestion, blood in the urine, diarrhea, pain with urination or any other symptoms at this time. She has a PMHx of bipolar disorder, anxiety, eating disorder, gastroparesis, pancreatitis, PE, B12 deficiency, vitamin D deficiency, antithrombin III deficiency, anticoagulant long-term use, and seizures. Patient has a PSHx of colon resection, appendectomy, and salpingo-oophorectomy.    The history is provided by the patient.   Review of patient's allergies indicates:   Allergen Reactions    Iron analogues Anaphylaxis     Infusion only. Tolerates po    Nsaids (non-steroidal anti-inflammatory drug)      cannot take NSAIDs- upsets the stomach     Past Medical History:   Diagnosis Date    Anticoagulant long-term use     Antithrombin III deficiency 08/15/2017    Anxiety     B12 deficiency 08/15/2017    Bipolar disorder     Constipation     Eating disorder     Encounter for blood transfusion     Gastroparesis     MTHFR mutation 08/15/2017    Pancreatitis     PONV (postoperative nausea and vomiting)     Pulmonary embolism     Seizures     AS A CHILD    Vitamin D deficiency 08/15/2017     Past Surgical History:   Procedure Laterality Date     APPENDECTOMY      bowel obstruction      x 2 COLON RESECTIONS    LAPAROSCOPIC SALPINGO-OOPHORECTOMY Right 12/15/2021    Procedure: SALPINGO-OOPHORECTOMY, LYSIS OF ADHESIONS ;  Surgeon: Gigi Oquendo MD;  Location: [facility];  Service: OB/GYN;  Laterality: Right;    LAPAROSCOPY N/A 12/15/2021    Procedure: LAPAROSCOPY;  Surgeon: Gigi Oquendo MD;  Location: [facility];  Service: OB/GYN;  Laterality: N/A;    lysis of adhesions      LYSIS OF ADHESIONS N/A 9/28/2022    Procedure: LYSIS, ADHESIONS;  Surgeon: Peter Perez MD;  Location: [facility];  Service: General;  Laterality: N/A;    OVARIAN CYST REMOVAL      REVISION OF SCAR N/A 9/28/2022    Procedure: LAPAROTOMY OF SCAR TISSUE REVISION;  Surgeon: Gigi Oquendo MD;  Location: [facility];  Service: OB/GYN;  Laterality: N/A;    uterine polyp removal       Family History   Problem Relation Age of Onset    Cancer Mother         breast cancer    Hyperlipidemia Father     Arthritis Father     Cancer Maternal Grandmother         breast and lung    Colon cancer Maternal Grandmother 62    Prostate cancer Maternal Grandfather     Prostate cancer Paternal Grandfather      Social History     Tobacco Use    Smoking status: Never    Smokeless tobacco: Never   Substance Use Topics    Alcohol use: No    Drug use: No     Review of Systems   Constitutional:  Negative for activity change, diaphoresis and fever.   HENT:  Negative for congestion, ear pain, rhinorrhea, sore throat and trouble swallowing.    Eyes:  Negative for pain and visual disturbance.   Respiratory:  Negative for cough, shortness of breath and stridor.    Cardiovascular:  Negative for chest pain.   Gastrointestinal:  Negative for abdominal pain, blood in stool, diarrhea, nausea and vomiting.   Genitourinary:  Negative for dysuria, hematuria, vaginal bleeding and vaginal discharge.   Musculoskeletal:  Negative for gait problem.   Skin:  Negative for rash and wound.   Neurological:  Positive for facial asymmetry,  speech difficulty, weakness (general) and headaches. Negative for seizures.   Psychiatric/Behavioral:  Negative for hallucinations and suicidal ideas.      Physical Exam     Initial Vitals   BP Pulse Resp Temp SpO2   04/25/23 1319 04/25/23 1319 04/25/23 1319 04/25/23 1436 04/25/23 1319   121/84 68 20 97.8 °F (36.6 °C) 100 %      MAP       --                Physical Exam    Nursing note and vitals reviewed.  Constitutional: She appears well-developed. She is not diaphoretic. No distress.   HENT:   Head: Normocephalic and atraumatic.   Nose: Nose normal.   Eyes: EOM are normal. Pupils are equal, round, and reactive to light. No scleral icterus.   Neck: Neck supple.   Normal range of motion.  Cardiovascular:  Normal rate, regular rhythm, normal heart sounds and intact distal pulses.     Exam reveals no gallop and no friction rub.       No murmur heard.  Pulmonary/Chest: Breath sounds normal. No stridor. No respiratory distress. She has no wheezes. She has no rhonchi. She has no rales.   Abdominal: Abdomen is soft. Bowel sounds are normal. She exhibits no distension. There is no abdominal tenderness. There is no rebound and no guarding.   Musculoskeletal:         General: Normal range of motion.      Cervical back: Normal range of motion and neck supple.     Neurological: She is alert and oriented to person, place, and time. She has normal strength. No cranial nerve deficit or sensory deficit. GCS eye subscore is 4. GCS verbal subscore is 5. GCS motor subscore is 6.   Cranial nerves II-XII grossly intact. Finger-to-nose normal. Tone normal. Sensation intact to light touch. No drift. No disdiadochokinesia. 5/5 BUE and BLE strength. Normal gait. Negative Romberg. Speech and cognition is normal. No focal neurologic deficit.   Skin: Skin is warm and dry. Capillary refill takes less than 2 seconds. No rash noted.   Psychiatric: She has a normal mood and affect.       ED Course   Critical Care    Date/Time: 4/25/2023 3:14  PM  Performed by: Ronald Flores MD  Authorized by: Ronald Flores MD   Direct patient critical care time: 35 minutes  Total critical care time (exclusive of procedural time) : 35 minutes  Critical care time was exclusive of separately billable procedures and treating other patients and teaching time.  Critical care was necessary to treat or prevent imminent or life-threatening deterioration of the following conditions: CNS failure or compromise.  Critical care was time spent personally by me on the following activities: review of old charts, pulse oximetry, re-evaluation of patient's condition, ordering and review of radiographic studies, ordering and review of laboratory studies, ordering and performing treatments and interventions, obtaining history from patient or surrogate, examination of patient and evaluation of patient's response to treatment.      Labs Reviewed   CBC W/ AUTO DIFFERENTIAL - Abnormal; Notable for the following components:       Result Value    MPV 9.1 (*)     All other components within normal limits    Narrative:     Release to patient->Immediate   COMPREHENSIVE METABOLIC PANEL - Abnormal; Notable for the following components:    Potassium 2.9 (*)     CO2 18 (*)     eGFR 58 (*)     All other components within normal limits    Narrative:     Release to patient->Immediate   LIPID PANEL - Abnormal; Notable for the following components:    Cholesterol 245 (*)     HDL 91 (*)     All other components within normal limits    Narrative:     Release to patient->Immediate   ISTAT PROCEDURE - Abnormal; Notable for the following components:    POC PTINR 1.3 (*)     All other components within normal limits   PROTIME-INR    Narrative:     Release to patient->Immediate   TSH    Narrative:     Release to patient->Immediate   HIV 1 / 2 ANTIBODY   HEPATITIS C ANTIBODY   POCT GLUCOSE, HAND-HELD DEVICE   POCT GLUCOSE   POCT PROTIME-INR     EKG Readings: (Independently Interpreted)   Initial Reading: No  STEMI. Rhythm: Normal Sinus Rhythm. Heart Rate: 74. Ectopy: No Ectopy. Conduction: Normal. ST Segments: Normal ST Segments. T Waves: Normal. Axis: Normal. Clinical Impression: Normal Sinus Rhythm     Imaging Results              CTA Head and Neck (xpd) (Final result)  Result time 04/25/23 13:59:42      Final result by Anish Johnson MD (04/25/23 13:59:42)                   Impression:      1. There is no acute abnormality.  There is no hemorrhage, mass or obvious acute infarction.  2. The cervical vertebral and carotid arteries are patent.  3. The intracranial arteries are patent without large vessel occlusion or critical stenosis.  It should be noted that MRI is more sensitive in the detection of subtle or acute nonhemorrhagic ischemic disease.  4. There is a stable small linear focus of enhancement in the left paramedian sharee, unchanged compared to prior studies and likely related to an occult vascular malformation such as capillary telangiectasia and/or cavernous malformation      Electronically signed by: Anish Johnson MD  Date:    04/25/2023  Time:    13:59               Narrative:    EXAMINATION:  CTA HEAD AND NECK (XPD)    CLINICAL HISTORY:  Stroke/TIA, determine embolic source;    TECHNIQUE:  Non contrast low dose axial images were obtained thought the head.  CT angiogram was performed from the level of the ronny to the top of the head following the IV administration of 75 mL of Omnipaque 350.   Sagittal and coronal reconstructions and maximum intensity projection reconstructions were performed. Arterial stenosis percentages are based on NASCET measurement criteria.    COMPARISON:  Brain MRI dated 12/19/2022, MRI orbits dated 09/30/2019    FINDINGS:  Head CT:    There is no acute abnormality or change in the appearance of the brain compared to the prior study.  Brain volume, ventricular size and position are normal.  There is no hemorrhage or mass.  The gray-white interface is preserved.  There  are no definite regions of abnormal attenuation in the brain.  There is a stable linear focus of enhancement in the left paramedian sharee.  This was present on prior study and may be related to a small capillary telangiectasia or cavernous malformation.  There is no other abnormal enhancement in the brain.    The paranasal sinuses and mastoid air cells are clear.  The calvarium is normal.    CTA Neck:    Arch and great vessels:    There is a conventional left-sided 3 vessel arch.  The aortic arch and the origins of the great vessels are all widely patent.  The included subclavian arteries are patent as well.    Carotid arteries:    Right Carotid artery: The right common, internal and external carotid arteries are normal in caliber and contour.  There is no stenosis, occlusion, thrombosis or dissection.    Left Carotid artery: The left common, internal and external carotid arteries are also normal in caliber and contour without thrombus, dissection, stenosis or occlusion.    Vertebral arteries:    The vertebral arteries are patent throughout their course in the neck and are normal in caliber and contour.  There is no thrombus, dissection, stenosis or occlusion.    Other:    The included lungs are clear without infiltrate or mass.  There is no superior mediastinal mass or pathologic lymphadenopathy.  The airway is patent.  Thyroid gland is normal.  Shotty lymph nodes in the neck are nonspecific.    CTA Head:    Anterior circulation:    The petrous and cavernous segments of the internal carotid arteries are patent.  The supraclinoid internal carotid arteries are patent.  The carotid terminus is normal bilaterally.  There is normal branching into the anterior and middle cerebral arteries.  There is a small patent anterior communicating artery.  There is no large vessel occlusion, critical stenosis, thrombus, dissection or large aneurysm.    Posterior circulation:    The vertebral and basilar arteries are patent.  The  basilar tip is normal.  There is fetal origin of the left posterior cerebral artery.  There is a small right-sided posterior communicating artery as well.  There is no large vessel occlusion or critical stenosis.  There is no thrombus, dissection or aneurysm.    Dural sinuses, other:    The dural sinuses are patent.                                       Medications   potassium chloride SA CR tablet 40 mEq (has no administration in time range)   potassium chloride 10 mEq in 100 mL IVPB (has no administration in time range)   iohexoL (OMNIPAQUE 350) 350 mg iodine/mL injection (75 mLs Intravenous Given 4/25/23 1345)     Medical Decision Making:   History:   Old Medical Records: I decided to obtain old medical records.  Independently Interpreted Test(s):   I have ordered and independently interpreted X-rays - see prior notes.  I have ordered and independently interpreted EKG Reading(s) - see prior notes  Clinical Tests:   Lab Tests: Ordered and Reviewed  Radiological Study: Ordered and Reviewed  Medical Tests: Ordered and Reviewed  Additional MDM:     NIH Stroke Scale:   Interval = baseline (upon arrival/admit)  Level of consciousness = 0 - alert  LOC questions = 0 - answers both correctly  LOC commands = 0 - performs both correctly  Best gaze = 0 - normal  Visual = 0 - no visual loss  Facial palsy = 0 - normal  Motor left arm =  0 - no drift  Motor right arm =  0 - no drift  Motor left leg = 0 - no drift  Motor right leg =  0 - no drift  Limb ataxia = 0 - absent  Sensory = 0 - normal  Best language = 0 - no aphasia  Dysarthria = 0 - normal articulation  Extinction and inattention = 0 - no neglect  NIH Stroke Scale Total = 0     Scribe Attestation:   Scribe #1: I performed the above scribed service and the documentation accurately describes the services I performed. I attest to the accuracy of the note.      ED Course as of 04/25/23 1514   Tue Apr 25, 2023   [id number] 43-year-old female emergently evaluated in the ER for  reported slurred speech and right sided facial drooping.  Code stroke called emergently while patient was evaluated in triage.  Symptoms resolved after approximately 5 minutes. Currently patient has no slurred speech facial asymmetry or unilateral weakness. [BD]   1413 CTA Head and Neck (xpd) [BD]   1414 Impression:     1. There is no acute abnormality.  There is no hemorrhage, mass or obvious acute infarction.  2. The cervical vertebral and carotid arteries are patent.  3. The intracranial arteries are patent without large vessel occlusion or critical stenosis.  It should be noted that MRI is more sensitive in the detection of subtle or acute nonhemorrhagic ischemic disease.  4. There is a stable small linear focus of enhancement in the left paramedian sharee, unchanged compared to prior studies and likely related to an occult vascular malformation such as capillary telangiectasia and/or cavernous malformation        Electronically signed by: Anish Johnson MD  Date:                                            04/25/2023  Time:                                           13:59 [BD]   1414 Tele consult with vascular neurology recommends admission for MRI and aspirin. [BD]   1513 Patient's labs show hypokalemia to 2.9 otherwise reassuring.  EKG unremarkable.  Will replete potassium and admit to Hospital Medicine for further workup management including MRI [BD]      ED Course User Index  [BD] Ronald Flores MD                 Attending Attestation:     Physician Attestation for Scribe:    I, Dr. Ronald Flores, personally performed the services described in this documentation.   All medical record entries made by the scribe were at my direction and in my presence.   I have reviewed the chart and agree that the record is accurate and complete.   Ronald Flores MD  3:13 PM 04/25/2023     DISCLAIMER: This note was prepared with [employer] voice recognition transcription software. Garbled syntax, mangled  pronouns, and other bizarre constructions may be attributed to that software system.    Clinical Impression:   Final diagnoses:  [E87.6] Hypokalemia (Primary)  [G45.9] TIA (transient ischemic attack)        ED Disposition Condition    Observation Stable                Ronald Flores MD  04/25/23 3016

## 2023-04-25 NOTE — ED NOTES
Pt placed on portable telemetry box [id number], monitor room notified and verified patient on monitor.

## 2023-04-25 NOTE — NURSING
Reviewed pt allergies with pt who reports she has a severe allergic reaction to folic acid that was prescribed to her in the past. Reports she almost  from taking folic acid due to her blood disorder and to add folic acid to her allergy list.  Folic acid is ordered daily per mar. Notified JUDD Amanda, seth and pharmacist of allergy to med ordered- med order discontinued.

## 2023-04-25 NOTE — CONSULTS
[facility]  Vascular Neurology  Comprehensive Stroke Center  TeleVascular Neurology Acute Consultation Note      Consults    Consulting Provider: SOHAM HERNÁNDEZ  Current Providers  No providers found    Patient Location:  [facility] EMERGENCY DEPARTMENT Emergency Department  Spoke hospital nurse at bedside with patient assisting consultant.     Patient information was obtained from patient, past medical records, and primary team.         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  NIHSS 0, CT head without acute abnormality. CTA brain and neck unremarkable.  Complicated migraine vs TIA.  MRI brain. Continue apixaban.      Diagnoses: TIA. Complicated migraine.  No new Assessment & Plan notes have been filed under this hospital service since the last note was generated.  Service: Vascular Neurology      STROKE DOCUMENTATION     Acute Stroke Times:   Acute Stroke Times   Last Known Normal Date: 04/25/23  Last Known Normal Time: 0900  Symptom Onset Date: 04/25/23  Symptom Onset Time: 0900  Stroke Team Called Date: 04/25/23  Stroke Team Called Time: 1339  Stroke Team Arrival Date: 04/25/23  Stroke Team Arrival Time: 1345  CT Interpretation Time: 1345  Thrombolytic Therapy Recommended: No  Thrombectomy Recommended: No    NIH Scale:  Interval: baseline  1a. Level of Consciousness: 0-->Alert, keenly responsive  1b. LOC Questions: 0-->Answers both questions correctly  1c. LOC Commands: 0-->Performs both tasks correctly  2. Best Gaze: 0-->Normal  3. Visual: 0-->No visual loss  4. Facial Palsy: 0-->Normal symmetrical movements  5a. Motor Arm, Left: 0-->No drift, limb holds 90 (or 45) degrees for full 10 secs  5b. Motor Arm, Right: 0-->No drift, limb holds 90 (or 45) degrees  "for full 10 secs  6a. Motor Leg, Left: 0-->No drift, leg holds 30 degree position for full 5 secs  6b. Motor Leg, Right: 0-->No drift, leg holds 30 degree position for full 5 secs  7. Limb Ataxia: 0-->Absent  8. Sensory: 0-->Normal, no sensory loss  9. Best Language: 0-->No aphasia, normal  10. Dysarthria: 0-->Normal  11. Extinction and Inattention (formerly Neglect): 0-->No abnormality  Total (NIH Stroke Scale): 0     Modified [place] Score: 0  [place] Coma Scale:15   ABCD2 Score:    [id number] Score:   HAS -BLED Score:   ICH Score:   Hunt & Calvillo Classification:       Blood pressure 112/80, pulse 80, resp. rate 20, height 5' 4" (1.626 m), weight 49 kg (108 lb), SpO2 99 %.  Eligible for thrombolytic therapy?: No  Thrombolytic therapy recomended: Thrombolytic therapy not recommended due to Patient back to neurological baseline  Possible Interventional Revascularization Candidate? 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  Now back to baseline.    No notes on file      Woke up with symptoms?: no    Recent bleeding noted: no  Does the patient take any Blood Thinners? yes  Medications: Anticoagulants:  apixaban/Eliquis      Past Medical History: Antithrombin III deficiency, MTHFR mutation, PE, bipolar disorder, migraine    Past Surgical History: no major surgeries within the last 2 weeks    Family History: no relevant history    Social History: no smoking, no drinking, no drugs    Allergies: Iron Analogues  Nsaids (Non-Steroidal Anti-Inflammatory Drug)     Review of Systems   Constitutional: Negative for chills, diaphoresis and fever.   HENT: Negative for hearing " "loss, tinnitus and trouble swallowing.    Eyes: Negative for pain and visual disturbance.   Respiratory: Negative for shortness of breath.    Cardiovascular: Negative for chest pain and palpitations.   Gastrointestinal: Negative for blood in stool and vomiting.   Endocrine: Negative for cold intolerance.   Genitourinary: Negative for hematuria.   Musculoskeletal: Negative for gait problem and neck pain.   Skin: Negative for rash.   Allergic/Immunologic: Negative for immunocompromised state.   Neurological: Positive for facial asymmetry, speech difficulty and headaches. Negative for dizziness, weakness and numbness.   Hematological: Does not bruise/bleed easily.   Psychiatric/Behavioral: Negative for agitation, behavioral problems and confusion.     Objective:   Vitals: Blood pressure 121/84, pulse 68, resp. rate 20, height 5' 4" (1.626 m), weight 49 kg (108 lb), SpO2 100 %.     CT READ: Yes  No hemmorhage. No mass effect. No early infarct signs.    CTA brain and neck: no LVO, no high grade intracranial stenosis, no significant carotid disease    Physical Exam  Vitals and nursing note reviewed.   Constitutional:       General: She is not in acute distress.     Appearance: Normal appearance.   HENT:      Head: Normocephalic and atraumatic.      Nose: Nose normal.   Eyes:      Extraocular Movements: Extraocular movements intact.   Cardiovascular:      Rate and Rhythm: Normal rate and regular rhythm.   Pulmonary:      Effort: No respiratory distress.   Abdominal:      General: There is no distension.   Genitourinary:     Comments: na  Musculoskeletal:      Cervical back: Normal range of motion.      Right lower leg: No edema.      Left lower leg: No edema.   Skin:     Findings: No erythema or rash.   Neurological:      Mental Status: She is alert and oriented to person, place, and time.      Cranial Nerves: No cranial nerve deficit.      Sensory: No sensory deficit.      Motor: No weakness.      Coordination: " Coordination normal.   Psychiatric:         Mood and Affect: Mood normal.         Behavior: Behavior normal.             Recommended the emergency room physician to have a brief discussion with the patient and/or family if available regarding the  risks and benefits of treatment, and to briefly document the occurrence of that discussion in his clinical encounter note.     The attending portion of this evaluation, treatment, and documentation was performed per Leobardo Rangel MD via audiovisual.    Billing code:  (non-intervention mild to moderate stroke, TIA, some mimics)      This patient has a critical neurological condition/illness, with some potential for high morbidity and mortality.  There is a moderate probability for acute neurological change leading to clinical and possibly life-threatening deterioration requiring highest level of physician preparedness for urgent intervention.  Care was coordinated with other physicians involved in the patient's care.  Radiologic studies and laboratory data were reviewed and interpreted, and plan of care was re-assessed based on the results.  Diagnosis, treatment options and prognosis may have been discussed with the patient and/or family members or caregiver.    In your opinion, this was a: Tier 2 Van Negative    Consult End Time: 210 pm    Leobardo Rangel MD  Comprehensive Stroke Center  Vascular Neurology   [facility]

## 2023-04-25 NOTE — PROGRESS NOTES
Pharmacist Renal Dose Adjustment Note    Janeth Siddiqi is a 43 y.o. female being treated with the medication Famotidine    Patient Data:    Vital Signs (Most Recent):  Temp: 97.8 °F (36.6 °C) (04/25/23 1436)  Pulse: 78 (04/25/23 1532)  Resp: 18 (04/25/23 1532)  BP: 114/76 (04/25/23 1532)  SpO2: 100 % (04/25/23 1532) Vital Signs (72h Range):  Temp:  [97.8 °F (36.6 °C)]   Pulse:  [68-80]   Resp:  [18-20]   BP: (112-121)/(72-84)   SpO2:  [99 %-100 %]      Recent Labs   Lab 04/25/23  1336   CREATININE 1.2     Serum creatinine: 1.2 mg/dL 04/25/23 1336  Estimated creatinine clearance: 47.2 mL/min    Famotidine therapy for Janeth Siddiqi [id number] has been evaluated according to the pharmacy practice protocol.      Based on the patient's Estimated Creatinine Clearance: 47.2 mL/min (based on SCr of 1.2 mg/dL)., famotidine therapy has been adjusted to 20 mg once daily.    Thank you,  Jeri Alejo

## 2023-04-25 NOTE — ED NOTES
Assumed patient care.   Pt identifiers checked and accurate with Janeth Siddiqi    Pt presents to ED with complaints of generalized weakness, slurred speech, right sided facial droop, nausea and headache episode last 5-10 minutes around 9 am today. Pt reports hx migraines, relieved by prescription medications. Pt reports mild headache at this time, denies all other complaints at this time.     Telestroke in progress. ED MD notified of stroke provider's recommendations.

## 2023-04-25 NOTE — SUBJECTIVE & OBJECTIVE
"  Woke up with symptoms?: no    Recent bleeding noted: no  Does the patient take any Blood Thinners? yes  Medications: Anticoagulants:  apixaban/Eliquis      Past Medical History: Antithrombin III deficiency, MTHFR mutation, PE, bipolar disorder, migraine    Past Surgical History: no major surgeries within the last 2 weeks    Family History: no relevant history    Social History: no smoking, no drinking, no drugs    Allergies: Iron Analogues  Nsaids (Non-Steroidal Anti-Inflammatory Drug)     Review of Systems   Constitutional: Negative for chills, diaphoresis and fever.   HENT: Negative for hearing loss, tinnitus and trouble swallowing.    Eyes: Negative for pain and visual disturbance.   Respiratory: Negative for shortness of breath.    Cardiovascular: Negative for chest pain and palpitations.   Gastrointestinal: Negative for blood in stool and vomiting.   Endocrine: Negative for cold intolerance.   Genitourinary: Negative for hematuria.   Musculoskeletal: Negative for gait problem and neck pain.   Skin: Negative for rash.   Allergic/Immunologic: Negative for immunocompromised state.   Neurological: Positive for facial asymmetry, speech difficulty and headaches. Negative for dizziness, weakness and numbness.   Hematological: Does not bruise/bleed easily.   Psychiatric/Behavioral: Negative for agitation, behavioral problems and confusion.     Objective:   Vitals: Blood pressure 121/84, pulse 68, resp. rate 20, height 5' 4" (1.626 m), weight 49 kg (108 lb), SpO2 100 %.     CT READ: Yes  No hemmorhage. No mass effect. No early infarct signs.    CTA brain and neck: no LVO, no high grade intracranial stenosis, no significant carotid disease    Physical Exam  Vitals and nursing note reviewed.   Constitutional:       General: She is not in acute distress.     Appearance: Normal appearance.   HENT:      Head: Normocephalic and atraumatic.      Nose: Nose normal.   Eyes:      Extraocular Movements: Extraocular movements " intact.   Cardiovascular:      Rate and Rhythm: Normal rate and regular rhythm.   Pulmonary:      Effort: No respiratory distress.   Abdominal:      General: There is no distension.   Genitourinary:     Comments: na  Musculoskeletal:      Cervical back: Normal range of motion.      Right lower leg: No edema.      Left lower leg: No edema.   Skin:     Findings: No erythema or rash.   Neurological:      Mental Status: She is alert and oriented to person, place, and time.      Cranial Nerves: No cranial nerve deficit.      Sensory: No sensory deficit.      Motor: No weakness.      Coordination: Coordination normal.   Psychiatric:         Mood and Affect: Mood normal.         Behavior: Behavior normal.

## 2023-04-26 VITALS
OXYGEN SATURATION: 100 % | WEIGHT: 107.81 LBS | HEIGHT: 64 IN | RESPIRATION RATE: 16 BRPM | DIASTOLIC BLOOD PRESSURE: 61 MMHG | SYSTOLIC BLOOD PRESSURE: 101 MMHG | HEART RATE: 74 BPM | TEMPERATURE: 98 F | BODY MASS INDEX: 18.4 KG/M2

## 2023-04-26 PROBLEM — E44.0 MODERATE MALNUTRITION: Status: ACTIVE | Noted: 2023-04-26

## 2023-04-26 LAB
ALBUMIN SERPL BCP-MCNC: 3.3 G/DL (ref 3.5–5.2)
ALP SERPL-CCNC: 50 U/L (ref 55–135)
ALT SERPL W/O P-5'-P-CCNC: 15 U/L (ref 10–44)
ANION GAP SERPL CALC-SCNC: 4 MMOL/L (ref 8–16)
AORTIC ROOT ANNULUS: 3.05 CM
AORTIC VALVE CUSP SEPERATION: 1.9 CM
APTT PPP: 28.5 SEC (ref 21–32)
ASCENDING AORTA: 2.78 CM
AST SERPL-CCNC: 14 U/L (ref 10–40)
AV INDEX (PROSTH): 0.99
AV MEAN GRADIENT: 2 MMHG
AV PEAK GRADIENT: 4 MMHG
AV VALVE AREA: 3.05 CM2
AV VELOCITY RATIO: 0.94
BASOPHILS # BLD AUTO: 0.05 K/UL (ref 0–0.2)
BASOPHILS NFR BLD: 1 % (ref 0–1.9)
BILIRUB SERPL-MCNC: 0.3 MG/DL (ref 0.1–1)
BSA FOR ECHO PROCEDURE: 1.49 M2
BUN SERPL-MCNC: 12 MG/DL (ref 6–20)
CALCIUM SERPL-MCNC: 8.4 MG/DL (ref 8.7–10.5)
CHLORIDE SERPL-SCNC: 114 MMOL/L (ref 95–110)
CO2 SERPL-SCNC: 19 MMOL/L (ref 23–29)
CREAT SERPL-MCNC: 0.8 MG/DL (ref 0.5–1.4)
CV ECHO LV RWT: 0.31 CM
DIFFERENTIAL METHOD: NORMAL
DOP CALC AO PEAK VEL: 1.05 M/S
DOP CALC AO VTI: 20.6 CM
DOP CALC LVOT AREA: 3.1 CM2
DOP CALC LVOT DIAMETER: 1.98 CM
DOP CALC LVOT PEAK VEL: 0.99 M/S
DOP CALC LVOT STROKE VOLUME: 62.78 CM3
DOP CALC MV VTI: 22.2 CM
DOP CALCLVOT PEAK VEL VTI: 20.4 CM
E WAVE DECELERATION TIME: 226.94 MSEC
E/A RATIO: 1.76
E/E' RATIO: 7.18 M/S
ECHO LV POSTERIOR WALL: 0.6 CM (ref 0.6–1.1)
EJECTION FRACTION: 52 %
EOSINOPHIL # BLD AUTO: 0.1 K/UL (ref 0–0.5)
EOSINOPHIL NFR BLD: 1.1 % (ref 0–8)
ERYTHROCYTE [DISTWIDTH] IN BLOOD BY AUTOMATED COUNT: 14 % (ref 11.5–14.5)
EST. GFR  (NO RACE VARIABLE): >60 ML/MIN/1.73 M^2
FRACTIONAL SHORTENING: 26 % (ref 28–44)
GLUCOSE SERPL-MCNC: 101 MG/DL (ref 70–110)
HCT VFR BLD AUTO: 37.2 % (ref 37–48.5)
HCV AB SERPL QL IA: NORMAL
HGB BLD-MCNC: 12.4 G/DL (ref 12–16)
HIV 1+2 AB+HIV1 P24 AG SERPL QL IA: NORMAL
IMM GRANULOCYTES # BLD AUTO: 0 K/UL (ref 0–0.04)
IMM GRANULOCYTES NFR BLD AUTO: 0 % (ref 0–0.5)
INR PPP: 1 (ref 0.8–1.2)
INTERVENTRICULAR SEPTUM: 0.72 CM (ref 0.6–1.1)
IVC DIAMETER: 1.42 CM
LA MAJOR: 3.49 CM
LEFT ATRIUM SIZE: 2.36 CM
LEFT INTERNAL DIMENSION IN SYSTOLE: 2.9 CM (ref 2.1–4)
LEFT VENTRICLE DIASTOLIC VOLUME INDEX: 44.53 ML/M2
LEFT VENTRICLE DIASTOLIC VOLUME: 67.24 ML
LEFT VENTRICLE MASS INDEX: 47 G/M2
LEFT VENTRICLE SYSTOLIC VOLUME INDEX: 21.3 ML/M2
LEFT VENTRICLE SYSTOLIC VOLUME: 32.18 ML
LEFT VENTRICULAR INTERNAL DIMENSION IN DIASTOLE: 3.93 CM (ref 3.5–6)
LEFT VENTRICULAR MASS: 70.49 G
LV LATERAL E/E' RATIO: 6.58 M/S
LV SEPTAL E/E' RATIO: 7.9 M/S
LVOT MG: 2.17 MMHG
LVOT MV: 0.69 CM/S
LYMPHOCYTES # BLD AUTO: 2.5 K/UL (ref 1–4.8)
LYMPHOCYTES NFR BLD: 47.7 % (ref 18–48)
MAGNESIUM SERPL-MCNC: 1.6 MG/DL (ref 1.6–2.6)
MCH RBC QN AUTO: 29.6 PG (ref 27–31)
MCHC RBC AUTO-ENTMCNC: 33.3 G/DL (ref 32–36)
MCV RBC AUTO: 89 FL (ref 82–98)
MONOCYTES # BLD AUTO: 0.6 K/UL (ref 0.3–1)
MONOCYTES NFR BLD: 12.2 % (ref 4–15)
MV MEAN GRADIENT: 2 MMHG
MV PEAK A VEL: 0.45 M/S
MV PEAK E VEL: 0.79 M/S
MV PEAK GRADIENT: 3 MMHG
MV STENOSIS PRESSURE HALF TIME: 67.01 MS
MV VALVE AREA BY CONTINUITY EQUATION: 2.83 CM2
MV VALVE AREA P 1/2 METHOD: 3.28 CM2
NEUTROPHILS # BLD AUTO: 2 K/UL (ref 1.8–7.7)
NEUTROPHILS NFR BLD: 38 % (ref 38–73)
NRBC BLD-RTO: 0 /100 WBC
PHOSPHATE SERPL-MCNC: 3.2 MG/DL (ref 2.7–4.5)
PLATELET # BLD AUTO: 238 K/UL (ref 150–450)
PMV BLD AUTO: 9.5 FL (ref 9.2–12.9)
POTASSIUM SERPL-SCNC: 4.4 MMOL/L (ref 3.5–5.1)
PROT SERPL-MCNC: 6.1 G/DL (ref 6–8.4)
PROTHROMBIN TIME: 11.7 SEC (ref 9–12.5)
PV MV: 0.58 M/S
PV PEAK VELOCITY: 0.79 CM/S
RA MAJOR: 3.33 CM
RA PRESSURE: 3 MMHG
RBC # BLD AUTO: 4.19 M/UL (ref 4–5.4)
RIGHT VENTRICULAR END-DIASTOLIC DIMENSION: 2.24 CM
RV TISSUE DOPPLER FREE WALL SYSTOLIC VELOCITY 1 (APICAL 4 CHAMBER VIEW): 0.01 CM/S
SODIUM SERPL-SCNC: 137 MMOL/L (ref 136–145)
STJ: 2.95 CM
TDI LATERAL: 0.12 M/S
TDI SEPTAL: 0.1 M/S
TDI: 0.11 M/S
WBC # BLD AUTO: 5.24 K/UL (ref 3.9–12.7)

## 2023-04-26 PROCEDURE — 36415 COLL VENOUS BLD VENIPUNCTURE: CPT | Performed by: NURSE PRACTITIONER

## 2023-04-26 PROCEDURE — 80053 COMPREHEN METABOLIC PANEL: CPT | Performed by: NURSE PRACTITIONER

## 2023-04-26 PROCEDURE — 96361 HYDRATE IV INFUSION ADD-ON: CPT

## 2023-04-26 PROCEDURE — 92523 SPEECH SOUND LANG COMPREHEN: CPT

## 2023-04-26 PROCEDURE — 25000003 PHARM REV CODE 250: Performed by: NURSE PRACTITIONER

## 2023-04-26 PROCEDURE — 85610 PROTHROMBIN TIME: CPT | Performed by: NURSE PRACTITIONER

## 2023-04-26 PROCEDURE — 85730 THROMBOPLASTIN TIME PARTIAL: CPT | Performed by: NURSE PRACTITIONER

## 2023-04-26 PROCEDURE — 94761 N-INVAS EAR/PLS OXIMETRY MLT: CPT

## 2023-04-26 PROCEDURE — 84100 ASSAY OF PHOSPHORUS: CPT | Performed by: NURSE PRACTITIONER

## 2023-04-26 PROCEDURE — 85025 COMPLETE CBC W/AUTO DIFF WBC: CPT | Performed by: NURSE PRACTITIONER

## 2023-04-26 PROCEDURE — 92610 EVALUATE SWALLOWING FUNCTION: CPT

## 2023-04-26 PROCEDURE — 97165 OT EVAL LOW COMPLEX 30 MIN: CPT

## 2023-04-26 PROCEDURE — 83735 ASSAY OF MAGNESIUM: CPT | Performed by: NURSE PRACTITIONER

## 2023-04-26 PROCEDURE — G0378 HOSPITAL OBSERVATION PER HR: HCPCS

## 2023-04-26 RX ORDER — ATORVASTATIN CALCIUM 40 MG/1
40 TABLET, FILM COATED ORAL DAILY
Qty: 90 TABLET | Refills: 3 | Status: SHIPPED | OUTPATIENT
Start: 2023-04-27 | End: 2024-04-26

## 2023-04-26 RX ORDER — RIMEGEPANT SULFATE 75 MG/75MG
75 TABLET, ORALLY DISINTEGRATING ORAL ONCE AS NEEDED
Qty: 7 TABLET | Refills: 0 | Status: SHIPPED | OUTPATIENT
Start: 2023-04-26 | End: 2023-04-26

## 2023-04-26 RX ORDER — SODIUM CHLORIDE 9 MG/ML
INJECTION, SOLUTION INTRAVENOUS ONCE
Status: DISCONTINUED | OUTPATIENT
Start: 2023-04-26 | End: 2023-04-26 | Stop reason: HOSPADM

## 2023-04-26 RX ORDER — HYDROXYZINE HYDROCHLORIDE 25 MG/1
50 TABLET, FILM COATED ORAL 2 TIMES DAILY PRN
Status: DISCONTINUED | OUTPATIENT
Start: 2023-04-26 | End: 2023-04-26 | Stop reason: HOSPADM

## 2023-04-26 RX ADMIN — ATORVASTATIN CALCIUM 40 MG: 40 TABLET, FILM COATED ORAL at 08:04

## 2023-04-26 RX ADMIN — LINACLOTIDE 290 MCG: 145 CAPSULE, GELATIN COATED ORAL at 08:04

## 2023-04-26 RX ADMIN — TOPIRAMATE 200 MG: 100 TABLET, FILM COATED ORAL at 08:04

## 2023-04-26 RX ADMIN — HYDROXYZINE HYDROCHLORIDE 50 MG: 25 TABLET, FILM COATED ORAL at 10:04

## 2023-04-26 RX ADMIN — SODIUM CHLORIDE 500 ML: 9 INJECTION, SOLUTION INTRAVENOUS at 08:04

## 2023-04-26 NOTE — PLAN OF CARE
Recommendations   1) Continue cardiac diet  2) Protein shakes without folic acid BID   3) weigh weekly   4) Nutrition education and handouts given   5) Vitamin supplementation ( ex: Ca and vitamin D that pt tolerates)    Goals: 1) PO intakes > 50% of melas at f/u  Nutrition Goal Status: new  Communication of RD Recs:  (POC, sticky note)

## 2023-04-26 NOTE — CONSULTS
[facility]  Adult Nutrition  Consult Note    SUMMARY     Recommendations   1) Continue cardiac diet  2) Protein shakes without folic acid BID   3) weigh weekly   4) Nutrition education and handouts given   5) Vitamin supplementation ( ex: Ca and vitamin D that pt tolerates)    Goals: 1) PO intakes > 50% of melas at f/u  Nutrition Goal Status: new  Communication of RD Recs:  (POC, sticky note)    Assessment and Plan    Moderate malnutrition  Malnutrition Type:  Context: chronic illness  Level: moderate    Related to (etiology):   Inadequate energy/protein intakes on restrictive diet    Signs and Symptoms (as evidenced by):     Energy Intake (Malnutrition): less than 75% for greater than or equal to 1 month  Mild-moderate wasting as charted in nutrition note    Interventions/Recommendations (treatment strategy):  Nutrition supplement therapy, nutrition education, fat/sodium modified diet  1) Continue cardiac diet 2) Protein shakes without folic acid BID 3) weigh weekly 4) Nutrition education and handouts given 5) Vitamin supplementation ( ex: Ca and vitamin D that pt tolerates    Nutrition Diagnosis Status:   new         Malnutrition Assessment  Malnutrition Context: chronic illness  Malnutrition Level: moderate  Skin (Micronutrient):  (Foreign = 23)  Teeth (Micronutrient):  (WDL)   Micronutrient Evaluation Summary: suspected deficiency  Micronutrient Evaluation Comments: iron, Check folic acid, B12, Vitamin D and Ca   Energy Intake (Malnutrition): less than 75% for greater than or equal to 1 month   Orbital Region (Subcutaneous Fat Loss): well nourished  Upper Arm Region (Subcutaneous Fat Loss): moderate depletion  Thoracic and Lumbar Region: mild depletion   Perkins Region (Muscle Loss): well nourished  Clavicle Bone Region (Muscle Loss): mild depletion  Clavicle and Acromion Bone Region (Muscle Loss): mild depletion  Scapular Bone Region (Muscle Loss): mild depletion  Dorsal Hand (Muscle Loss):  "well nourished  Patellar Region (Muscle Loss): mild depletion  Anterior Thigh Region (Muscle Loss): mild depletion  Posterior Calf Region (Muscle Loss): moderate depletion                 Reason for Assessment    Reason For Assessment: consult  Diagnosis:  (TIA)  Relevant Medical History: IDH, MTHFR, clotting disorders per pt, migrains, Vitamin D deficiency, PE, B12, bipolar, anxiety, osteopenia per pt  Interdisciplinary Rounds: did not attend    General Information Comments: 42 y/o female admitted with possible TIA, cleared from neuro standpoint and sent home this afternoon. PTA pt is underweight, very fatigued and avoids many foods r/t need to avoid foods/drinks enriched with folic acid. NFPE 4/16/23 mild-moderate wasting seen. Weight stable x at least the past year. @ interview this morning, pt tells me she eats ~ 2 meals/day, mostly meats and vegetables ( ex: boiled shrimp, sweet potato, and salad). We discussed different ways to add calorie/protein to her diet including making protein smoothies at home. I discussed with her when meeting kcal/protein needs is important and explained how to do that on a restrictive diet. Educational materials provided. ( of note, pt with history of many vitamin/mineral deficiencies and told me she, gained wt recently, "~ 1 lb?" and this was a cause of concern for her because she didn't know why- referral to psych might as well as outpatient RD if covered by insurance)    Nutrition Discharge Planning: To be determined- Regular diet- avoid foods fortified/enriched with folic acid + 2 protein home made smoothies daily    Nutrition Risk Screen    Nutrition Risk Screen: no indicators present    Nutrition/Diet History    Patient Reported Diet/Restrictions/Preferences: general  Typical Food/Fluid Intake: Avoids enriched/forified grain products  Spiritual, Cultural Beliefs, Roman Catholic Practices, Values that Affect Care: no  Food Allergies: NKFA  Factors Affecting Nutritional Intake: None " "identified at this time    Anthropometrics    Temp: 98 °F (36.7 °C)  Height Method: Measured  Height: 5' 4"  Height (inches): 64 in  Weight Method: Bed Scale  Weight: 48.9 kg (107 lb 12.9 oz)  Weight (lb): 107.81 lb  Ideal Body Weight (IBW), Female: 120 lb  % Ideal Body Weight, Female (lb): 89.84 %  BMI (Calculated): 18.5  BMI Grade: 18.5-24.9 - normal  Weight Loss: unintentional  Usual Body Weight (UBW), k.3 kg (22)  % Usual Body Weight: 97.42  % Weight Change From Usual Weight: -2.78 %       Lab/Procedures/Meds    Pertinent Labs Reviewed: reviewed    BMP  Lab Results   Component Value Date     2023    K 4.4 2023     (H) 2023    CO2 19 (L) 2023    BUN 12 2023    CREATININE 0.8 2023    CALCIUM 8.4 (L) 2023    ANIONGAP 4 (L) 2023    EGFRNORACEVR >60 2023   lastest  Lab Results   Component Value Date    IRON 141 2022    TRANSFERRIN 188 (L) 2019    TIBC 242 (L) 2022    LABIRON 58 (H) 2022    FESATURATED 57 (H) 2019    latest  Lab Results   Component Value Date    FOLATE 4.6 (L) 2023     Lab Results   Component Value Date    ALBUMIN 3.3 (L) 2023       Pertinent Medications Reviewed: reviewed  Pertinent Medications Comments: statin, zofran    Estimated/Assessed Needs    Weight Used For Calorie Calculations: 48.9 kg (107 lb 12.9 oz)  Energy Calorie Requirements (kcal): MSJ ( x 1.4-1.5) = 7490-2509 kcal  Energy Need Method: Grace Redding  Protein Requirements: 1-1.2 g protein/kg ( 49-58g)  Weight Used For Protein Calculations: 48.9 kg (107 lb 12.9 oz)  Fluid Requirements (mL): 1639-1849 ml  Estimated Fluid Requirement Method: RDA Method  CHO Requirement: N/A      Nutrition Prescription Ordered    Current Diet Order: Cardiac Diet    Evaluation of Received Nutrient/Fluid Intake    Energy Calories Required: meeting needs  Protein Required: meeting needs  Fluid Required: not meeting needs  Tolerance: " tolerating     Intake/Output Summary (Last 24 hours) at 4/26/2023 1336  Last data filed at 4/26/2023 1227  Gross per 24 hour   Intake 1000 ml   Output 550 ml   Net 450 ml       % Intake of Estimated Energy Needs: 75%  % Meal Intake: 75%    Nutrition Risk    Level of Risk/Frequency of Follow-up:  (1 x weekly)       Monitor and Evaluation    Food and Nutrient Intake: energy intake, food and beverage intake  Food and Nutrient Adminstration: diet order  Knowledge/Beliefs/Attitudes: food and nutrition knowledge/skill  Anthropometric Measurements: weight  Biochemical Data, Medical Tests and Procedures: electrolyte and renal panel  Nutrition-Focused Physical Findings: overall appearance       Nutrition Follow-Up    RD Follow-up?: Yes

## 2023-04-26 NOTE — HOSPITAL COURSE
Pt was monitor closely during her hospital stay.  She remained symptom free and had no further stroke-like symptoms.  She underwent MRI which was negative for acute findings.  She was evaluated by Neurology and was cleared for discharge.  Patient was instructed on resuming Eliquis and statin initiated on discharge.  Patient verbalized understanding of discharge instructions and return precautions.    PE:  Awake alert orient x3, pleasant, no acute distress   Heart-regular rate rhythm, no edema   Lungs-Clear to auscultation bilaterally, respirations even unlabored   Abdomen-soft nontender normoactive bowel sounds   Extremities-moving all equally without any focal deficits

## 2023-04-26 NOTE — DISCHARGE SUMMARY
[facility] Medicine  Discharge Summary      Patient Name: Janeth Siddiqi  MRN: [id number]  SID: [id number]  Patient Class: OP- Observation  Admission Date: 4/25/2023  Hospital Length of Stay: 0 days  Discharge Date and Time: 4/26/2023 12:35 PM  Attending Physician: No att. providers found   Discharging Provider: Brenda Mata NP  Primary Care Provider: [facility]    Primary Care Team: Networked reference to record PCT     HPI:   Janeth Siddiqi is a 44 y/o F with a past medical history significant for FARHAN, migraine headaches, bipolar disorder, anxiety, MTHFR mutation, PE, B12 deficiency, vitamin D deficiency, antithrombin III deficiency, anticoagulant long-term use currently on Eliquis, and seizures who presented to the ED for further evaluation of an episode of right sided facial droop and aphasia which pt reports lasted about 5-10 minutes and then resolved.  This episode was accompanied by nausea but no vomiting.  She reports recently being more fatigued than normal, but no recent fevers, chest pain, SOB or abdominal pain. She underwent CTA head and neck while in the ED which revealed no acute process.  Further imaging with MRI was recommended.  Labs were significant for hypokalemia.  She is placed in observation under the service of hospital medicine for continued monitoring and treatment.      * No surgery found *      Hospital Course:   Pt was monitor closely during her hospital stay.  She remained symptom free and had no further stroke-like symptoms.  She underwent MRI which was negative for acute findings.  She was evaluated by Neurology and was cleared for discharge.  Patient was instructed on resuming Eliquis and statin initiated on discharge.  Patient verbalized understanding of discharge instructions and return precautions.    PE:  Awake alert orient x3, pleasant, no acute distress   Heart-regular rate rhythm,  no edema   Lungs-Clear to auscultation bilaterally, respirations even unlabored   Abdomen-soft nontender normoactive bowel sounds   Extremities-moving all equally without any focal deficits       Goals of Care Treatment Preferences:  Code Status: Full Code      Consults:   Consults (From admission, onward)        Status Ordering Provider     Inpatient consult to Registered Dietitian/Nutritionist  Once        Provider:  (Not yet assigned)    Completed LISSA SAMSON     Inpatient consult to Registered Dietitian/Nutritionist  Once        Provider:  (Not yet assigned)    Completed LISSA SAMSON     IP consult to case management/social work  Once        Provider:  (Not yet assigned)    Completed LISSA SAMSON     Inpatient consult to Neurology  Once        Provider:  Priti Bergman MD    Acknowledged LISSA SAMSON     Consult to Telemedicine - Acute Stroke  Once        Provider:  Leobardo Rangel MD    Acknowledged SOHAM HERNÁNDEZ          Neuro  * TIA (transient ischemic attack)  Consult Neurology  Check CMP,CBC, Mag, Phos  Fasting Lipid Panel  MRI w/o contrast  CTA head and neck -reviewed  Antiplatelet therapy-defer to neurology as pt chronically taking eliquis  ECHO for potential embolic source  Statin for ABCD2 >2 or LDL>90  PT/OT functional assessment if needed        Oncology  Iron deficiency anemia  Chronic problem.  Stable.  Monitor H/H.  Continue iron supplementation.  Transfuse for hemodynamic instability and/or H/H <7/21        Endocrine  Body mass index (BMI) of 19 or less in adult  Nutrition consulted. Encourage maximal PO intake. Diet supplementation ordered per nutrition approval. Will encourage PO and monitor closely for weight changes.        Final Active Diagnoses:    Diagnosis Date Noted POA    PRINCIPAL PROBLEM:  TIA (transient ischemic attack) [G45.9] 04/25/2023 Yes    Moderate malnutrition [E44.0] 04/26/2023 Yes    Migraine headache [G43.909] 04/25/2023 Yes     Antithrombin III deficiency [D68.59] 08/15/2017 Yes    Iron deficiency anemia [D50.9] 08/10/2017 Yes    Body mass index (BMI) of 19 or less in adult [Z68.1] 08/10/2017 Yes      Problems Resolved During this Admission:       Discharged Condition: good    Disposition: Home or Self Care    Follow Up:   Follow-up Information     Access [facility]. Schedule an appointment as soon as possible for a visit in 1 week(s).    Contact information:  [place] [address]  [phone]             Priti Rolle MD Follow up in 1 week(s).    Specialty: Neurology  Why: hospital follow up  Contact information:  [address]  [place] [address]  [phone]                       Patient Instructions:      Diet Adult Regular     Notify your health care provider if you experience any of the following:  temperature >100.4     Notify your health care provider if you experience any of the following:  persistent nausea and vomiting or diarrhea     Notify your health care provider if you experience any of the following:  severe uncontrolled pain     Notify your health care provider if you experience any of the following:  difficulty breathing or increased cough     Notify your health care provider if you experience any of the following:  persistent dizziness, light-headedness, or visual disturbances     Notify your health care provider if you experience any of the following:  increased confusion or weakness     Activity as tolerated       Significant Diagnostic Studies: Labs:   CMP   Recent Labs   Lab 04/25/23  1336 04/26/23  0436    137   K 2.9* 4.4    114*   CO2 18* 19*   GLU 95 101   BUN 18 12   CREATININE 1.2 0.8   CALCIUM 9.2 8.4*   PROT 7.7 6.1   ALBUMIN 4.0 3.3*   BILITOT 0.3 0.3   ALKPHOS 56 50*   AST 18 14   ALT 16 15   ANIONGAP 11 4*   , CBC   Recent Labs   Lab 04/25/23  1336 04/26/23  0436   WBC 5.25 5.24   HGB 15.1 12.4   HCT 45.5 37.2    238    and  All labs within the past 24 hours have been reviewed    Pending Diagnostic Studies:     None         Medications:  Reconciled Home Medications:      Medication List      START taking these medications    atorvastatin 40 MG tablet  Commonly known as: LIPITOR  Take 1 tablet (40 mg total) by mouth once daily.  Start taking on: April 27, 2023     NURTEC 75 mg odt  Generic drug: rimegepant  Take 1 tablet (75 mg total) by mouth once as needed for Migraine. Place ODT tablet on the tongue; alternatively the ODT tablet may be placed under the tongue. Do not exceed 1 tablet in 24 hours        CONTINUE taking these medications    acetaminophen 325 MG tablet  Commonly known as: TYLENOL  Take 650 mg by mouth every 6 (six) hours as needed for Pain.     apixaban 5 mg Tab  Commonly known as: ELIQUIS  Take 1 tablet (5 mg total) by mouth 2 (two) times daily.     ashwagandha root extract 300 mg Cap  Take 300 mg by mouth Daily.     grnesjq-nituvqc-dtrftrj-terbin 4-2-1-4 % Soln  Apply topically. Right toe nail     gabapentin 300 MG capsule  Commonly known as: NEURONTIN  Take 400 mg by mouth every evening.     HYDROcodone-acetaminophen 5-325 mg per tablet  Commonly known as: NORCO  Take 1 tablet by mouth every 12 (twelve) hours as needed.     hydrocortisone 2.5 % cream  Apply topically 2 (two) times daily as needed.     hydrOXYzine 50 MG tablet  Commonly known as: ATARAX  Take 50 mg by mouth nightly as needed.     ibuprofen 800 MG tablet  Commonly known as: ADVIL,MOTRIN  Take 1 tablet (800 mg total) by mouth 3 (three) times daily.     LINZESS 290 mcg Cap capsule  Generic drug: linaCLOtide  Take 290 mcg by mouth once daily.     LO LOESTRIN FE ORAL  Take by mouth every evening.     ondansetron 8 MG Tbdl  Commonly known as: ZOFRAN-ODT  Take 1 tablet (8 mg total) by mouth every 8 (eight) hours as needed.     topiramate 200 MG Tab  Commonly known as: TOPAMAX  Take 200 mg by mouth once daily.     traMADoL 50 mg tablet  Commonly known as:  ULTRAM  Take 50 mg by mouth every 6 (six) hours as needed.     traZODone 150 MG tablet  Commonly known as: DESYREL  Take 300 mg by mouth nightly.        STOP taking these medications    ondansetron 4 MG tablet  Commonly known as: ZOFRAN     oxyCODONE-acetaminophen 5-325 mg per tablet  Commonly known as: PERCOCET     pantoprazole 40 MG tablet  Commonly known as: PROTONIX            Indwelling Lines/Drains at time of discharge:   Lines/Drains/Airways     None                 Time spent on the discharge of patient: 50 minutes         Brenda Mata NP  Department of Hospital Medicine  [facility]

## 2023-04-26 NOTE — H&P
[facility] Medicine  History & Physical    Patient Name: Janeth Siddiqi  MRN: [id number]  Patient Class: OP- Observation  Admission Date: 4/25/2023  Attending Physician: Reina Soliz MD   Primary Care Provider: Lizy Diamond MD         Patient information was obtained from patient and ER records.     Subjective:     Principal Problem:TIA (transient ischemic attack)    Chief Complaint:   Chief Complaint   Patient presents with    Fatigue    Aphasia     Patient reports having an episode of aphasia with a right side headache, states she had facial drooping that lasted approx 5 minutes and then resolved, states she feels weak and more tired than normal         HPI: Janeth Siddiqi is a 44 y/o F with a past medical history significant for FARHAN, migraine headaches, bipolar disorder, anxiety, MTHFR mutation, PE, B12 deficiency, vitamin D deficiency, antithrombin III deficiency, anticoagulant long-term use currently on Eliquis, and seizures who presented to the ED for further evaluation of an episode of right sided facial droop and aphasia which pt reports lasted about 5-10 minutes and then resolved.  This episode was accompanied by nausea but no vomiting.  She reports recently being more fatigued than normal, but no recent fevers, chest pain, SOB or abdominal pain. She underwent CTA head and neck while in the ED which revealed no acute process.  Further imaging with MRI was recommended.  Labs were significant for hypokalemia.  She is placed in observation under the service of hospital medicine for continued monitoring and treatment.      Past Medical History:   Diagnosis Date    Anticoagulant long-term use     Antithrombin III deficiency 08/15/2017    Anxiety     B12 deficiency 08/15/2017    Bipolar disorder     Constipation     Eating disorder     Encounter for blood transfusion     Gastroparesis     MTHFR mutation 08/15/2017    Pancreatitis     PONV  (postoperative nausea and vomiting)     Pulmonary embolism     Seizures     AS A CHILD    Vitamin D deficiency 08/15/2017       Past Surgical History:   Procedure Laterality Date    APPENDECTOMY      bowel obstruction      x 2 COLON RESECTIONS    LAPAROSCOPIC SALPINGO-OOPHORECTOMY Right 12/15/2021    Procedure: SALPINGO-OOPHORECTOMY, LYSIS OF ADHESIONS ;  Surgeon: Gigi Oquendo MD;  Location: [facility];  Service: OB/GYN;  Laterality: Right;    LAPAROSCOPY N/A 12/15/2021    Procedure: LAPAROSCOPY;  Surgeon: Gigi Oquendo MD;  Location: [facility];  Service: OB/GYN;  Laterality: N/A;    lysis of adhesions      LYSIS OF ADHESIONS N/A 9/28/2022    Procedure: LYSIS, ADHESIONS;  Surgeon: Peter Perez MD;  Location: [facility];  Service: General;  Laterality: N/A;    OVARIAN CYST REMOVAL      REVISION OF SCAR N/A 9/28/2022    Procedure: LAPAROTOMY OF SCAR TISSUE REVISION;  Surgeon: Gigi Oquendo MD;  Location: [facility];  Service: OB/GYN;  Laterality: N/A;    uterine polyp removal         Review of patient's allergies indicates:   Allergen Reactions    Folic acid     Iron analogues Anaphylaxis     Infusion only. Tolerates po    Nsaids (non-steroidal anti-inflammatory drug)      cannot take NSAIDs- upsets the stomach       Current Facility-Administered Medications on File Prior to Encounter   Medication    diphenhydrAMINE injection 25 mg    HYDROmorphone injection 0.5 mg    ondansetron injection 4 mg    prochlorperazine injection Soln 5 mg    sodium chloride 0.9% bolus 250 mL     Current Outpatient Medications on File Prior to Encounter   Medication Sig    apixaban (ELIQUIS) 5 mg Tab Take 1 tablet (5 mg total) by mouth 2 (two) times daily. (Patient taking differently: Take 5 mg by mouth every evening.)    ashwagandha root extract 300 mg Cap Take 300 mg by mouth Daily.    gabapentin (NEURONTIN) 300 MG capsule Take 400 mg by mouth every evening.    hydrocortisone 2.5 % cream Apply topically 2  (two) times daily as needed.    hydrOXYzine (ATARAX) 50 MG tablet Take 50 mg by mouth nightly as needed.    ibuprofen (ADVIL,MOTRIN) 800 MG tablet Take 1 tablet (800 mg total) by mouth 3 (three) times daily.    LINZESS 290 mcg Cap Take 290 mcg by mouth once daily.     ondansetron (ZOFRAN-ODT) 8 MG TbDL Take 1 tablet (8 mg total) by mouth every 8 (eight) hours as needed.    topiramate (TOPAMAX) 200 MG Tab Take 200 mg by mouth once daily.    traMADoL (ULTRAM) 50 mg tablet Take 50 mg by mouth every 6 (six) hours as needed.    traZODone (DESYREL) 150 MG tablet Take 300 mg by mouth nightly.    acetaminophen (TYLENOL) 325 MG tablet Take 650 mg by mouth every 6 (six) hours as needed for Pain.    ktxaqlw-jyhpjxi-ohbqlya-terbin 4-2-1-4 % Soln Apply topically. Right toe nail    HYDROcodone-acetaminophen (NORCO) 5-325 mg per tablet Take 1 tablet by mouth every 12 (twelve) hours as needed.    norethindrone-e.estradiol-iron (LO LOESTRIN FE ORAL) Take by mouth every evening.    ondansetron (ZOFRAN) 4 MG tablet Take 1 tablet (4 mg total) by mouth 2 (two) times daily. (Patient taking differently: Take 4 mg by mouth every 12 (twelve) hours as needed.)    oxyCODONE-acetaminophen (PERCOCET) 5-325 mg per tablet Take 1 tablet by mouth every 4 (four) hours as needed for Pain.    pantoprazole (PROTONIX) 40 MG tablet Take 40 mg by mouth once daily.    [DISCONTINUED] calcium-vitamin D3 (OYSTER SHELL CALCIUM-VIT D3) 500 mg(1,250mg) -200 unit per tablet Take 1 tablet by mouth 2 (two) times daily.    [DISCONTINUED] clindamycin (CLEOCIN T) 1 % lotion Apply topically as needed.    [DISCONTINUED] cyanocobalamin 1,000 mcg/mL injection Inject 1 mL (1,000 mcg total) into the skin every 30 days.    [DISCONTINUED] famotidine (PEPCID) 20 MG tablet Take 1 tablet (20 mg total) by mouth 2 (two) times daily.    [DISCONTINUED] folic acid (FOLVITE) 1 MG tablet Take 1 tablet (1 mg total) by mouth once daily.    [DISCONTINUED] JUNEL FE  1/20, 28, 1 mg-20 mcg (21)/75 mg (7) per tablet Take 1 tablet by mouth nightly.    [DISCONTINUED] multivitamin capsule Take 1 capsule by mouth nightly.     Family History       Problem Relation (Age of Onset)    Arthritis Father    Cancer Mother, Maternal Grandmother    Colon cancer Maternal Grandmother (62)    Hyperlipidemia Father    Prostate cancer Maternal Grandfather, Paternal Grandfather          Tobacco Use    Smoking status: Never    Smokeless tobacco: Never   Substance and Sexual Activity    Alcohol use: No    Drug use: No    Sexual activity: Not on file     Review of Systems   Constitutional:  Positive for fatigue. Negative for appetite change, chills and fever.   HENT:  Negative for congestion, rhinorrhea and sore throat.    Respiratory:  Negative for cough, shortness of breath and wheezing.    Cardiovascular:  Negative for chest pain and palpitations.   Gastrointestinal:  Negative for abdominal pain, diarrhea and vomiting.   Genitourinary:  Negative for dysuria and hematuria.   Neurological:  Positive for speech difficulty. Negative for weakness.   Psychiatric/Behavioral:  Negative for agitation and confusion.    All other systems reviewed and are negative.  Objective:     Vital Signs (Most Recent):  Temp: 98 °F (36.7 °C) (04/25/23 1700)  Pulse: 70 (04/25/23 1700)  Resp: 20 (04/25/23 1700)  BP: 119/75 (04/25/23 1700)  SpO2: 99 % (04/25/23 1700) Vital Signs (24h Range):  Temp:  [97.8 °F (36.6 °C)-98 °F (36.7 °C)] 98 °F (36.7 °C)  Pulse:  [68-80] 70  Resp:  [18-20] 20  SpO2:  [99 %-100 %] 99 %  BP: (112-122)/(72-84) 119/75     Weight: 49 kg (108 lb)  Body mass index is 18.54 kg/m².    Physical Exam  Constitutional:       Appearance: She is well-developed.   HENT:      Head: Normocephalic and atraumatic.   Eyes:      Conjunctiva/sclera: Conjunctivae normal.      Pupils: Pupils are equal, round, and reactive to light.   Cardiovascular:      Rate and Rhythm: Normal rate and regular rhythm.      Heart  sounds: Normal heart sounds.   Pulmonary:      Effort: Pulmonary effort is normal.      Breath sounds: Normal breath sounds.   Abdominal:      General: Bowel sounds are normal.      Palpations: Abdomen is soft.   Musculoskeletal:         General: Normal range of motion.      Cervical back: Normal range of motion and neck supple.   Skin:     General: Skin is warm and dry.   Neurological:      Mental Status: She is alert and oriented to person, place, and time.   Psychiatric:         Behavior: Behavior normal.         CRANIAL NERVES     CN III, IV, VI   Pupils are equal, round, and reactive to light.     Significant Labs: All pertinent labs within the past 24 hours have been reviewed.  CBC:   Recent Labs   Lab 04/25/23  1336   WBC 5.25   HGB 15.1   HCT 45.5        CMP:   Recent Labs   Lab 04/25/23  1336      K 2.9*      CO2 18*   GLU 95   BUN 18   CREATININE 1.2   CALCIUM 9.2   PROT 7.7   ALBUMIN 4.0   BILITOT 0.3   ALKPHOS 56   AST 18   ALT 16   ANIONGAP 11     Lipid Panel:   Recent Labs   Lab 04/25/23  1336   CHOL 245*   HDL 91*   LDLCALC 146.4   TRIG 38   CHOLHDL 37.1       Significant Imaging: I have reviewed all pertinent imaging results/findings within the past 24 hours.  CTA head and neck:  1. There is no acute abnormality.  There is no hemorrhage, mass or obvious acute infarction.  2. The cervical vertebral and carotid arteries are patent.  3. The intracranial arteries are patent without large vessel occlusion or critical stenosis.  It should be noted that MRI is more sensitive in the detection of subtle or acute nonhemorrhagic ischemic disease.  4. There is a stable small linear focus of enhancement in the left paramedian sharee, unchanged compared to prior studies and likely related to an occult vascular malformation such as capillary telangiectasia and/or cavernous malformation    Assessment/Plan:     * TIA (transient ischemic attack)  Consult Neurology  Check CMP,CBC, Mag, Phos  Fasting  Lipid Panel  MRI w/o contrast  CTA head and neck -reviewed  Antiplatelet therapy-defer to neurology as pt chronically taking eliquis  ECHO for potential embolic source  Statin for ABCD2 >2 or LDL>90  PT/OT functional assessment if needed        Migraine headache  Continue chronic topamax      Antithrombin III deficiency  Continue chronic Eliquis      Iron deficiency anemia  Chronic problem.  Stable.  Monitor H/H.  Continue iron supplementation.  Transfuse for hemodynamic instability and/or H/H <7/21        Body mass index (BMI) of 19 or less in adult  Nutrition consulted. Encourage maximal PO intake. Diet supplementation ordered per nutrition approval. Will encourage PO and monitor closely for weight changes.          VTE Risk Mitigation (From admission, onward)         Ordered     apixaban tablet 5 mg  2 times daily         04/25/23 1618     IP VTE HIGH RISK PATIENT  Once         04/25/23 1618     Place sequential compression device  Until discontinued         04/25/23 1618     Reason for No Pharmacological VTE Prophylaxis  Once        Question:  Reasons:  Answer:  Already adequately anticoagulated on oral Anticoagulants    04/25/23 1618                     On 04/25/2023, patient should be placed in hospital observation services under my care in collaboration with Dr. Reina Soliz.      Aleshia Amanda, ADEOLA  Department of Hospital Medicine  [facility]

## 2023-04-26 NOTE — ASSESSMENT & PLAN NOTE
Consult Neurology  Check CMP,CBC, Mag, Phos  Fasting Lipid Panel  MRI w/o contrast  CTA head and neck -reviewed  Antiplatelet therapy-defer to neurology as pt chronically taking eliquis  ECHO for potential embolic source  Statin for ABCD2 >2 or LDL>90  PT/OT functional assessment if needed

## 2023-04-26 NOTE — PT/OT/SLP EVAL
Occupational Therapy   Evaluation and Discharge Note    Name: Janeth Siddiqi  MRN: [id number]  Admitting Diagnosis: TIA (transient ischemic attack)  Recent Surgery: * No surgery found *    Recommendations:     Discharge Recommendations: home  Discharge Equipment Recommendations: home  Barriers to discharge:      Assessment:     Janeth Siddiqi is a 43 y.o. female with a medical diagnosis of TIA (transient ischemic attack). At this time, patient is functioning at their prior level of function and does not require further acute OT services.     Plan:     During this hospitalization, patient does not require further acute OT services.  Please re-consult if situation changes.      Subjective     Chief Complaint: None stated  Patient/Family Comments/goals: To go home    Occupational Profile:  Living Environment: Pt lives with her parents.   Previous level of function: Independent with ADLs  Equipment Used at home: None  Assistance upon Discharge: None    Pain/Comfort:  Pain Rating 1: 0/10    Patients cultural, spiritual, Yarsani conflicts given the current situation: yes    Objective:     Communicated with: nurse prior to session.  Patient found HOB elevated upon OT entry to room.    General Precautions: Standard  Orthopedic Precautions: N/A  Braces: N/A  Respiratory Status: Room air     Occupational Performance:    Bed Mobility:    Patient completed Supine to Sit with independence  Patient completed Sit to Supine with independence    Functional Mobility/Transfers:  Patient completed Sit <> Stand Transfer with independence with no assistive device     Activities of Daily Living:  Feeding:  independence  Grooming: independence  Upper Body Dressing: independence  Lower Body Dressing: independence  Toileting: independence    Cognitive/Visual Perceptual:  Cognitive/Psychosocial Skills:  -       Oriented to: Person, Place, Time, and Situation   -       Follows Commands/attention: Follows multistep  commands  -       Communication: clear/fluent    Physical Exam:  Upper Extremity Range of Motion:  -       Right Upper Extremity: WFL  -       Left Upper Extremity: WFL  Upper Extremity Strength: -       Right Upper Extremity: WFL  -       Left Upper Extremity: WFL    AMPAC 6 Click ADL:  AMPAC Total Score: 24      Patient left HOB elevated with all lines intact and call button in reach.    GOALS:   Multidisciplinary Problems       Occupational Therapy Goals       Not on file                    History:     Past Medical History:   Diagnosis Date    Anticoagulant long-term use     Antithrombin III deficiency 08/15/2017    Anxiety     B12 deficiency 08/15/2017    Bipolar disorder     Constipation     Eating disorder     Encounter for blood transfusion     Gastroparesis     MTHFR mutation 08/15/2017    Pancreatitis     PONV (postoperative nausea and vomiting)     Pulmonary embolism     Seizures     AS A CHILD    Vitamin D deficiency 08/15/2017         Past Surgical History:   Procedure Laterality Date    APPENDECTOMY      bowel obstruction      x 2 COLON RESECTIONS    LAPAROSCOPIC SALPINGO-OOPHORECTOMY Right 12/15/2021    Procedure: SALPINGO-OOPHORECTOMY, LYSIS OF ADHESIONS ;  Surgeon: Gigi Oquendo MD;  Location: [facility];  Service: OB/GYN;  Laterality: Right;    LAPAROSCOPY N/A 12/15/2021    Procedure: LAPAROSCOPY;  Surgeon: Gigi Oquendo MD;  Location: [facility];  Service: OB/GYN;  Laterality: N/A;    lysis of adhesions      LYSIS OF ADHESIONS N/A 9/28/2022    Procedure: LYSIS, ADHESIONS;  Surgeon: Peter Perez MD;  Location: [facility];  Service: General;  Laterality: N/A;    OVARIAN CYST REMOVAL      REVISION OF SCAR N/A 9/28/2022    Procedure: LAPAROTOMY OF SCAR TISSUE REVISION;  Surgeon: Gigi Oquendo MD;  Location: [facility];  Service: OB/GYN;  Laterality: N/A;    uterine polyp removal         Time Tracking:     OT Date of Treatment: 04/26/23  OT Start Time: 1040  OT Stop Time: 1102  OT Total Time  (min): 22 min    Billable Minutes:Evaluation 22 4/26/2023

## 2023-04-26 NOTE — PLAN OF CARE
[facility]  Initial Discharge Assessment       Primary Care Provider: [facility]    Admission Diagnosis: TIA (transient ischemic attack) [G45.9]  Hypokalemia [E87.6]    Admission Date: 4/25/2023  Expected Discharge Date: 4/26/2023    Met with pt at bedside to complete discharge assessment, verified PCP, pharmacy and information on facesheet.  No DME, HH or dialysis.  Devante Buckley will drive pt home. No needs.    Discharge Barriers Identified: None    Payor: MEDICAID / Plan: [employer] [facility] / Product Type: Managed Medicaid /     Extended Emergency Contact Information  Primary Emergency Contact: Heike Thorpe  Address: [address] [place], [place] [address] [place] [address]  Home Phone: [phone]  Relation: Mother  Secondary Emergency Contact: DEVANTE THORPE  Mobile Phone: [phone]  Relation: Brother   needed? No    Discharge Plan A: Home with family  Discharge Plan B: Home with family      [id number] [address] & [address] [address]  Phone: [phone] Fax: [phone]      Initial Assessment (most recent)       Adult Discharge Assessment - 04/26/23 1119          Discharge Assessment    Assessment Type Discharge Planning Assessment     Confirmed/corrected address, phone number and insurance Yes     Confirmed Demographics Correct on Facesheet     Source of Information patient     Communicated IGNACIO with patient/caregiver Yes     People in Home parent(s);child(ok), adult     Facility Arrived From: home     Do you expect to return to your current living situation? Yes     Do you have help at home or someone to help you manage your care at home? Yes     Who are your caregiver(s) and their phone number(s)? Devante buckley     Prior to hospitilization cognitive status: Alert/Oriented     Current cognitive status: Alert/Oriented     Home Accessibility  wheelchair accessible     Home Layout Bathroom on 2nd floor;Bedroom on 2nd floor     Equipment Currently Used at Home none     Readmission within 30 days? No     Patient currently being followed by outpatient case management? No     Do you currently have service(s) that help you manage your care at home? No     Do you take prescription medications? Yes     Do you have prescription coverage? Yes     Coverage Medicaid     Do you have any problems affording any of your prescribed medications? No     Is the patient taking medications as prescribed? yes     Who is going to help you get home at discharge? brother, Chance     How do you get to doctors appointments? family or friend will provide     Are you on dialysis? No     Do you take coumadin? No     Discharge Plan A Home with family     Discharge Plan B Home with family     DME Needed Upon Discharge  none     Discharge Plan discussed with: Patient     Discharge Barriers Identified None

## 2023-04-26 NOTE — HPI
Janeth Siddiqi is a 42 y/o F with a past medical history significant for FARHAN, migraine headaches, bipolar disorder, anxiety, MTHFR mutation, PE, B12 deficiency, vitamin D deficiency, antithrombin III deficiency, anticoagulant long-term use currently on Eliquis, and seizures who presented to the ED for further evaluation of an episode of right sided facial droop and aphasia which pt reports lasted about 5-10 minutes and then resolved.  This episode was accompanied by nausea but no vomiting.  She reports recently being more fatigued than normal, but no recent fevers, chest pain, SOB or abdominal pain. She underwent CTA head and neck while in the ED which revealed no acute process.  Further imaging with MRI was recommended.  Labs were significant for hypokalemia.  She is placed in observation under the service of hospital medicine for continued monitoring and treatment.

## 2023-04-26 NOTE — NURSING
Discharge instructions given to pt. Pt verbalizes understanding. All questions answered and encouraged. PIV removed. Pt tolerated well. All belongings returned. Pt left floor safely via wheelchair.

## 2023-04-26 NOTE — ASSESSMENT & PLAN NOTE
Malnutrition Type:  Context: chronic illness  Level: moderate    Related to (etiology):   Inadequate energy/protein intakes on restrictive diet    Signs and Symptoms (as evidenced by):     Energy Intake (Malnutrition): less than 75% for greater than or equal to 1 month  Mild-moderate wasting as charted in nutrition note    Interventions/Recommendations (treatment strategy):  Nutrition supplement therapy, nutrition education, fat/sodium modified diet  1) Continue cardiac diet 2) Protein shakes without folic acid BID 3) weigh weekly 4) Nutrition education and handouts given 5) Vitamin supplementation ( ex: Ca and vitamin D that pt tolerates    Nutrition Diagnosis Status:   new

## 2023-04-26 NOTE — DISCHARGE INSTRUCTIONS
Rest and drink adequate fluids.  Please take medications as prescribed.  Return to ED for any worsening symptoms or concerns.  Follow-up as directed.    Discharge Instructions, [facility] Medicine    Thank you for choosing [facility] for your medical care.     You were admitted to the hospital with TIA (transient ischemic attack).     Please note your discharge instructions, including diet/activity restrictions, follow-up appointments, and medication changes.  If you have any questions about your medical issues, prescriptions, or any other questions, please feel free to contact the [facility] Medicine Dept at [phone] and we will help.    If you are previously with Home health, outpatient PT/OT or under a therapy program, you are cleared to return to those programs.    Please direct all long term medication refills and follow up to your primary care provider, [facility]. Thank you again for letting us take care of your health care needs.    Please note the following discharge instructions per your discharging physician-  Brenda Mata NP

## 2023-04-26 NOTE — SUBJECTIVE & OBJECTIVE
Past Medical History:   Diagnosis Date    Anticoagulant long-term use     Antithrombin III deficiency 08/15/2017    Anxiety     B12 deficiency 08/15/2017    Bipolar disorder     Constipation     Eating disorder     Encounter for blood transfusion     Gastroparesis     MTHFR mutation 08/15/2017    Pancreatitis     PONV (postoperative nausea and vomiting)     Pulmonary embolism     Seizures     AS A CHILD    Vitamin D deficiency 08/15/2017       Past Surgical History:   Procedure Laterality Date    APPENDECTOMY      bowel obstruction      x 2 COLON RESECTIONS    LAPAROSCOPIC SALPINGO-OOPHORECTOMY Right 12/15/2021    Procedure: SALPINGO-OOPHORECTOMY, LYSIS OF ADHESIONS ;  Surgeon: Gigi Oquendo MD;  Location: [facility];  Service: OB/GYN;  Laterality: Right;    LAPAROSCOPY N/A 12/15/2021    Procedure: LAPAROSCOPY;  Surgeon: Gigi Oquendo MD;  Location: [facility];  Service: OB/GYN;  Laterality: N/A;    lysis of adhesions      LYSIS OF ADHESIONS N/A 9/28/2022    Procedure: LYSIS, ADHESIONS;  Surgeon: Peter Perez MD;  Location: [facility];  Service: General;  Laterality: N/A;    OVARIAN CYST REMOVAL      REVISION OF SCAR N/A 9/28/2022    Procedure: LAPAROTOMY OF SCAR TISSUE REVISION;  Surgeon: Gigi Oquendo MD;  Location: [facility];  Service: OB/GYN;  Laterality: N/A;    uterine polyp removal         Review of patient's allergies indicates:   Allergen Reactions    Folic acid     Iron analogues Anaphylaxis     Infusion only. Tolerates po    Nsaids (non-steroidal anti-inflammatory drug)      cannot take NSAIDs- upsets the stomach       Current Facility-Administered Medications on File Prior to Encounter   Medication    diphenhydrAMINE injection 25 mg    HYDROmorphone injection 0.5 mg    ondansetron injection 4 mg    prochlorperazine injection Soln 5 mg    sodium chloride 0.9% bolus 250 mL     Current Outpatient Medications on File Prior to Encounter   Medication Sig    apixaban (ELIQUIS) 5 mg Tab Take 1 tablet (5 mg  total) by mouth 2 (two) times daily. (Patient taking differently: Take 5 mg by mouth every evening.)    ashwagandha root extract 300 mg Cap Take 300 mg by mouth Daily.    gabapentin (NEURONTIN) 300 MG capsule Take 400 mg by mouth every evening.    hydrocortisone 2.5 % cream Apply topically 2 (two) times daily as needed.    hydrOXYzine (ATARAX) 50 MG tablet Take 50 mg by mouth nightly as needed.    ibuprofen (ADVIL,MOTRIN) 800 MG tablet Take 1 tablet (800 mg total) by mouth 3 (three) times daily.    LINZESS 290 mcg Cap Take 290 mcg by mouth once daily.     ondansetron (ZOFRAN-ODT) 8 MG TbDL Take 1 tablet (8 mg total) by mouth every 8 (eight) hours as needed.    topiramate (TOPAMAX) 200 MG Tab Take 200 mg by mouth once daily.    traMADoL (ULTRAM) 50 mg tablet Take 50 mg by mouth every 6 (six) hours as needed.    traZODone (DESYREL) 150 MG tablet Take 300 mg by mouth nightly.    acetaminophen (TYLENOL) 325 MG tablet Take 650 mg by mouth every 6 (six) hours as needed for Pain.    grspfzc-prmqlsl-grkxgnq-terbin 4-2-1-4 % Soln Apply topically. Right toe nail    HYDROcodone-acetaminophen (NORCO) 5-325 mg per tablet Take 1 tablet by mouth every 12 (twelve) hours as needed.    norethindrone-e.estradiol-iron (LO LOESTRIN FE ORAL) Take by mouth every evening.    ondansetron (ZOFRAN) 4 MG tablet Take 1 tablet (4 mg total) by mouth 2 (two) times daily. (Patient taking differently: Take 4 mg by mouth every 12 (twelve) hours as needed.)    oxyCODONE-acetaminophen (PERCOCET) 5-325 mg per tablet Take 1 tablet by mouth every 4 (four) hours as needed for Pain.    pantoprazole (PROTONIX) 40 MG tablet Take 40 mg by mouth once daily.    [DISCONTINUED] calcium-vitamin D3 (OYSTER SHELL CALCIUM-VIT D3) 500 mg(1,250mg) -200 unit per tablet Take 1 tablet by mouth 2 (two) times daily.    [DISCONTINUED] clindamycin (CLEOCIN T) 1 % lotion Apply topically as needed.    [DISCONTINUED] cyanocobalamin 1,000 mcg/mL injection Inject 1 mL (1,000 mcg  total) into the skin every 30 days.    [DISCONTINUED] famotidine (PEPCID) 20 MG tablet Take 1 tablet (20 mg total) by mouth 2 (two) times daily.    [DISCONTINUED] folic acid (FOLVITE) 1 MG tablet Take 1 tablet (1 mg total) by mouth once daily.    [DISCONTINUED] JUNEL FE 1/20, 28, 1 mg-20 mcg (21)/75 mg (7) per tablet Take 1 tablet by mouth nightly.    [DISCONTINUED] multivitamin capsule Take 1 capsule by mouth nightly.     Family History       Problem Relation (Age of Onset)    Arthritis Father    Cancer Mother, Maternal Grandmother    Colon cancer Maternal Grandmother (62)    Hyperlipidemia Father    Prostate cancer Maternal Grandfather, Paternal Grandfather          Tobacco Use    Smoking status: Never    Smokeless tobacco: Never   Substance and Sexual Activity    Alcohol use: No    Drug use: No    Sexual activity: Not on file     Review of Systems   Constitutional:  Positive for fatigue. Negative for appetite change, chills and fever.   HENT:  Negative for congestion, rhinorrhea and sore throat.    Respiratory:  Negative for cough, shortness of breath and wheezing.    Cardiovascular:  Negative for chest pain and palpitations.   Gastrointestinal:  Negative for abdominal pain, diarrhea and vomiting.   Genitourinary:  Negative for dysuria and hematuria.   Neurological:  Positive for speech difficulty. Negative for weakness.   Psychiatric/Behavioral:  Negative for agitation and confusion.    All other systems reviewed and are negative.  Objective:     Vital Signs (Most Recent):  Temp: 98 °F (36.7 °C) (04/25/23 1700)  Pulse: 70 (04/25/23 1700)  Resp: 20 (04/25/23 1700)  BP: 119/75 (04/25/23 1700)  SpO2: 99 % (04/25/23 1700) Vital Signs (24h Range):  Temp:  [97.8 °F (36.6 °C)-98 °F (36.7 °C)] 98 °F (36.7 °C)  Pulse:  [68-80] 70  Resp:  [18-20] 20  SpO2:  [99 %-100 %] 99 %  BP: (112-122)/(72-84) 119/75     Weight: 49 kg (108 lb)  Body mass index is 18.54 kg/m².    Physical Exam  Constitutional:       Appearance: She is  well-developed.   HENT:      Head: Normocephalic and atraumatic.   Eyes:      Conjunctiva/sclera: Conjunctivae normal.      Pupils: Pupils are equal, round, and reactive to light.   Cardiovascular:      Rate and Rhythm: Normal rate and regular rhythm.      Heart sounds: Normal heart sounds.   Pulmonary:      Effort: Pulmonary effort is normal.      Breath sounds: Normal breath sounds.   Abdominal:      General: Bowel sounds are normal.      Palpations: Abdomen is soft.   Musculoskeletal:         General: Normal range of motion.      Cervical back: Normal range of motion and neck supple.   Skin:     General: Skin is warm and dry.   Neurological:      Mental Status: She is alert and oriented to person, place, and time.   Psychiatric:         Behavior: Behavior normal.         CRANIAL NERVES     CN III, IV, VI   Pupils are equal, round, and reactive to light.     Significant Labs: All pertinent labs within the past 24 hours have been reviewed.  CBC:   Recent Labs   Lab 04/25/23  1336   WBC 5.25   HGB 15.1   HCT 45.5        CMP:   Recent Labs   Lab 04/25/23  1336      K 2.9*      CO2 18*   GLU 95   BUN 18   CREATININE 1.2   CALCIUM 9.2   PROT 7.7   ALBUMIN 4.0   BILITOT 0.3   ALKPHOS 56   AST 18   ALT 16   ANIONGAP 11     Lipid Panel:   Recent Labs   Lab 04/25/23  1336   CHOL 245*   HDL 91*   LDLCALC 146.4   TRIG 38   CHOLHDL 37.1       Significant Imaging: I have reviewed all pertinent imaging results/findings within the past 24 hours.  CTA head and neck:  1. There is no acute abnormality.  There is no hemorrhage, mass or obvious acute infarction.  2. The cervical vertebral and carotid arteries are patent.  3. The intracranial arteries are patent without large vessel occlusion or critical stenosis.  It should be noted that MRI is more sensitive in the detection of subtle or acute nonhemorrhagic ischemic disease.  4. There is a stable small linear focus of enhancement in the left paramedian sharee,  unchanged compared to prior studies and likely related to an occult vascular malformation such as capillary telangiectasia and/or cavernous malformation

## 2023-04-26 NOTE — PLAN OF CARE
Pt cleared for discharge from case managemnt    Email sent to [facility] requesting a HFU appt for pt and asked that they call pt with appt.    Called [phone], spoke to Janeth to schedule a Neuro appt, will send message to Neuro staff and someone will call pt to schedule appt.     04/26/23 114   Final Note   Assessment Type Final Discharge Note   Anticipated Discharge Disposition Home   What phone number can be called within the next 1-3 days to see how you are doing after discharge?   ([phone])

## 2023-04-26 NOTE — PT/OT/SLP EVAL
Speech Language Pathology Evaluation  Cognitive/Bedside Swallow    Patient Name:  Janeth Siddiqi   MRN:  [id number]  Admitting Diagnosis: TIA (transient ischemic attack)    Recommendations:                  General Recommendations:  Follow-up not indicated  Diet recommendations:  Regular, Thin   Aspiration Precautions: Standard aspiration precautions   General Precautions: Standard,    Communication strategies:  none    History:   Per HPI:   Janeth Siddiqi is a 42 y/o F with a past medical history significant for FARHAN, migraine headaches, bipolar disorder, anxiety, MTHFR mutation, PE, B12 deficiency, vitamin D deficiency, antithrombin III deficiency, anticoagulant long-term use currently on Eliquis, and seizures who presented to the ED for further evaluation of an episode of right sided facial droop and aphasia which pt reports lasted about 5-10 minutes and then resolved.  This episode was accompanied by nausea but no vomiting.  She reports recently being more fatigued than normal, but no recent fevers, chest pain, SOB or abdominal pain. She underwent CTA head and neck while in the ED which revealed no acute process.  Further imaging with MRI was recommended.  Labs were significant for hypokalemia.  She is placed in observation under the service of hospital medicine for continued monitoring and treatment.    Past Medical History:   Diagnosis Date    Anticoagulant long-term use     Antithrombin III deficiency 08/15/2017    Anxiety     B12 deficiency 08/15/2017    Bipolar disorder     Constipation     Eating disorder     Encounter for blood transfusion     Gastroparesis     MTHFR mutation 08/15/2017    Pancreatitis     PONV (postoperative nausea and vomiting)     Pulmonary embolism     Seizures     AS A CHILD    Vitamin D deficiency 08/15/2017       Past Surgical History:   Procedure Laterality Date    APPENDECTOMY      bowel obstruction      x 2 COLON RESECTIONS    LAPAROSCOPIC SALPINGO-OOPHORECTOMY  Right 12/15/2021    Procedure: SALPINGO-OOPHORECTOMY, LYSIS OF ADHESIONS ;  Surgeon: Gigi Oquendo MD;  Location: [facility];  Service: OB/GYN;  Laterality: Right;    LAPAROSCOPY N/A 12/15/2021    Procedure: LAPAROSCOPY;  Surgeon: Gigi Oquendo MD;  Location: [facility];  Service: OB/GYN;  Laterality: N/A;    lysis of adhesions      LYSIS OF ADHESIONS N/A 9/28/2022    Procedure: LYSIS, ADHESIONS;  Surgeon: Peter Perez MD;  Location: [facility];  Service: General;  Laterality: N/A;    OVARIAN CYST REMOVAL      REVISION OF SCAR N/A 9/28/2022    Procedure: LAPAROTOMY OF SCAR TISSUE REVISION;  Surgeon: Gigi Oquendo MD;  Location: [facility];  Service: OB/GYN;  Laterality: N/A;    uterine polyp removal         Social History: Patient lives with parents and brother.    Prior Intubation HX:  none this admit    Modified Barium Swallow: none in Epic    Imaging:  MRI Brain Without Contrast   Final Result      There is no acute abnormality.  There is no hemorrhage, mass or acute infarction.  There is no change compared to the prior brain MRI dated 12/19/2022.         Electronically signed by: Anish Johnson MD   Date:    04/25/2023   Time:    16:55      CTA Head and Neck (xpd)   Final Result      1. There is no acute abnormality.  There is no hemorrhage, mass or obvious acute infarction.   2. The cervical vertebral and carotid arteries are patent.   3. The intracranial arteries are patent without large vessel occlusion or critical stenosis.  It should be noted that MRI is more sensitive in the detection of subtle or acute nonhemorrhagic ischemic disease.   4. There is a stable small linear focus of enhancement in the left paramedian sharee, unchanged compared to prior studies and likely related to an occult vascular malformation such as capillary telangiectasia and/or cavernous malformation         Electronically signed by: Anish Johnson MD   Date:    04/25/2023   Time:    13:59           Prior diet:  "Regular/thin.    Occupation/hobbies/homemaking: Pt reports PLOF as independent. Does not drive 2° anxiety. Bachelors degree in Psychology.    Subjective     "It [speech] was slurred." (Resolved)    Pain/Comfort:  Pain Rating 1: 0/10    Respiratory Status: Room air    Objective:   Pt seen for clinical swallow and cognitive communication evaluations. She is AAOx4, pleasant and cooperative. Reports speech, language and cognition has returned to baseline. Speech is clear, fluent and appropriate. Soft spoken.    Cognitive Status:  MoCA (Version 8.1) administered with pt achieving a score of 27/30 suggesting cognitive-linguistic skills WFL. Pt earned 4 of 5 points on visuospatial/executive functions, 3 of 3 points on naming, 6 of 6 points for attention, 3 of 3 points for language, 2 of 2 points for abstraction, 3 of 5 points for delayed recall and 6 of 6 points for orientation.       Receptive Language:   Comprehension:      WFL      Expressive Language:  Verbal:    WFL      Motor Speech:  WFL    Voice:   Intensity --reduced. Soft spoken    Visual-Spatial:  WFL    Reading:   DNT; denies difficulty      Written Expression:   DNT; denies difficulty    Oral Musculature Evaluation  Oral Musculature: WNL  Dentition: present and adequate  Secretion Management: adequate  Mucosal Quality: adequate  Mandibular Strength and Mobility: WNL  Oral Labial Strength and Mobility: WNL  Lingual Strength and Mobility: WNL  Velar Elevation: WNL  Buccal Strength and Mobility: WNL  Volitional Cough: adequate  Volitional Swallow: able to palpate laryngeal rise  Voice Prior to PO Intake: clear; soft spoken    Bedside Swallow Eval:   Consistencies Assessed:  Thin liquids --via cup and straw  Puree --applesauce  Mixed consistencies --diced peaches  Solids --patricio cracker      Oral Phase:   WFL    Pharyngeal Phase:   no overt clinical signs/symptoms of aspiration  no overt clinical signs/symptoms of pharyngeal dysphagia    Compensatory " Strategies  None    Treatment: Pt/family educated re: results/recs of evaluation. Receptive to information provided.     Assessment:   Clinical swallow and cognitive communication evaluation completed. Speech, language and cognition appear to be at baseline and WFL. Oropharyngeal swallow judged to be WFL. Skilled ST not indicated at this time.     Plan:     Patient to be seen:      Plan of Care expires:     Plan of Care reviewed with:  patient   SLP Follow-Up:  No       Discharge recommendations:  Discharge Facility/Level of Care Needs: home   Barriers to Discharge:  None    Time Tracking:     SLP Treatment Date:   04/26/23  Speech Start Time:  1012  Speech Stop Time:  1033     Speech Total Time (min):  21 min    Billable Minutes: Eval 13 , Eval Swallow and Oral Function 8, and Total Time 21    04/26/2023

## 2023-04-26 NOTE — NURSING
Informed Nicky Easton NP of patient and situation, isbar provided, explained to provider that patient is requesting her birth control pills for tonight. NP stated we are holding this for now due to her symptoms on admit/tia dx.

## 2023-04-26 NOTE — PLAN OF CARE
Problem: Adjustment to Illness (Stroke, Ischemic/Transient Ischemic Attack)  Goal: Optimal Coping  Outcome: Ongoing, Progressing     Problem: Cognitive Impairment (Stroke, Ischemic/Transient Ischemic Attack)  Goal: Optimal Cognitive Function  Outcome: Ongoing, Progressing     Problem: Communication Impairment (Stroke, Ischemic/Transient Ischemic Attack)  Goal: Improved Communication Skills  Outcome: Ongoing, Progressing     Problem: Functional Ability Impaired (Stroke, Ischemic/Transient Ischemic Attack)  Goal: Optimal Functional Ability  Outcome: Ongoing, Progressing     Problem: Adult Inpatient Plan of Care  Goal: Plan of Care Review  Outcome: Ongoing, Progressing  Goal: Patient-Specific Goal (Individualized)  Outcome: Ongoing, Progressing  Goal: Optimal Comfort and Wellbeing  Outcome: Ongoing, Progressing  Goal: Readiness for Transition of Care  Outcome: Ongoing, Progressing

## 2023-04-26 NOTE — CONSULTS
[facility]  Neurology Consult    Patient Name: Janeth Siddiqi   MRN: [id number]  : 1980  TODAY'S DATE: 2023  ADMIT DATE: 2023  1:30 PM                                          CONSULTED PROVIDER: Priti Rolel MD, Neurologist. On-call Phone: [phone]  CONSULT REQUESTED BY: Reina Soliz MD     Chief Complaint   Patient presents with    Fatigue    Aphasia     Patient reports having an episode of aphasia with a right side headache, states she had facial drooping that lasted approx 5 minutes and then resolved, states she feels weak and more tired than normal         HPI per EMR:  Janeth Siddiqi is a 42 y/o F with a past medical history significant for FARHAN, migraine headaches, bipolar disorder, anxiety, MTHFR mutation, PE, B12 deficiency, vitamin D deficiency, antithrombin III deficiency, anticoagulant long-term use currently on Eliquis, and seizures who presented to the ED for further evaluation of an episode of right sided facial droop and aphasia which pt reports lasted about 5-10 minutes and then resolved.  This episode was accompanied by nausea but no vomiting.  She reports recently being more fatigued than normal, but no recent fevers, chest pain, SOB or abdominal pain. She underwent CTA head and neck while in the ED which revealed no acute process.  Further imaging with MRI was recommended.  Labs were significant for hypokalemia.  She is placed in observation under the service of hospital medicine for continued monitoring and treatment.    Neurology Consult:  Patient was seen and examined by me today.  She is a 43-year-old woman with history of migraine headaches, bipolar disorder, MTHFR mutation, antithrombin 3 deficiency, anticoagulant long-term use on Eliquis who presented with right-sided facial droop and word-finding difficulty that started suddenly and resolved less than an hour later.  Notably, patient reports she was taking the  Eliquis only once a day but upon review of chart, she sees Dr. Hurst in clinic and was prescribed twice a day.  She currently denies any new neuro deficits, and states that she is been under a lot of stress since her grandmother .  She is currently not following up with Neurology but does take preventative for migraine which is the Topamax.  She denies any focal weakness, numbness, dizziness, vision loss, slurred speech.    Review of Systems:    A comprehensive ROS was performed and all systems were negative except as noted above in HPI.    Past Medical History:  has a past medical history of Anticoagulant long-term use, Antithrombin III deficiency (08/15/2017), Anxiety, B12 deficiency (08/15/2017), Bipolar disorder, Constipation, Eating disorder, Encounter for blood transfusion, Gastroparesis, MTHFR mutation (08/15/2017), Pancreatitis, PONV (postoperative nausea and vomiting), Pulmonary embolism, Seizures, and Vitamin D deficiency (08/15/2017).    Past Surgical History:  has a past surgical history that includes lysis of adhesions; bowel obstruction; uterine polyp removal; Ovarian cyst removal; Appendectomy; Laparoscopic salpingo-oophorectomy (Right, 12/15/2021); Laparoscopy (N/A, 12/15/2021); Revision of scar (N/A, 2022); and Lysis of adhesions (N/A, 2022).    Family Medical History: family history includes Arthritis in her father; Cancer in her maternal grandmother and mother; Colon cancer (age of onset: 62) in her maternal grandmother; Hyperlipidemia in her father; Prostate cancer in her maternal grandfather and paternal grandfather.    Social History:  reports that she has never smoked. She has never used smokeless tobacco. She reports that she does not drink alcohol and does not use drugs.    PCP: Lizy Diamond MD    Allergies:   Review of patient's allergies indicates:   Allergen Reactions    Folic acid     Iron analogues Anaphylaxis     Infusion only. Tolerates po    Nsaids (non-steroidal  anti-inflammatory drug)      cannot take NSAIDs- upsets the stomach       Medications:   Current Facility-Administered Medications on File Prior to Encounter   Medication Dose Route Frequency Provider Last Rate Last Admin    diphenhydrAMINE injection 25 mg  25 mg Intravenous Q6H PRN Aric Driver MD        HYDROmorphone injection 0.5 mg  0.5 mg Intravenous Q5 Min PRN Aric Driver MD   0.5 mg at 09/28/22 0958    ondansetron injection 4 mg  4 mg Intravenous Daily PRN Aric Driver MD        prochlorperazine injection Soln 5 mg  5 mg Intravenous Q30 Min PRN Aric Driver MD        sodium chloride 0.9% bolus 250 mL  250 mL Intravenous Once Aric Driver MD         Current Outpatient Medications on File Prior to Encounter   Medication Sig Dispense Refill    apixaban (ELIQUIS) 5 mg Tab Take 1 tablet (5 mg total) by mouth 2 (two) times daily. (Patient taking differently: Take 5 mg by mouth every evening.) 60 tablet 3    ashwagandha root extract 300 mg Cap Take 300 mg by mouth Daily.      gabapentin (NEURONTIN) 300 MG capsule Take 400 mg by mouth every evening.      hydrocortisone 2.5 % cream Apply topically 2 (two) times daily as needed.      hydrOXYzine (ATARAX) 50 MG tablet Take 50 mg by mouth nightly as needed.      ibuprofen (ADVIL,MOTRIN) 800 MG tablet Take 1 tablet (800 mg total) by mouth 3 (three) times daily. 40 tablet 2    LINZESS 290 mcg Cap Take 290 mcg by mouth once daily.   1    ondansetron (ZOFRAN-ODT) 8 MG TbDL Take 1 tablet (8 mg total) by mouth every 8 (eight) hours as needed. 15 tablet 0    topiramate (TOPAMAX) 200 MG Tab Take 200 mg by mouth once daily.      traMADoL (ULTRAM) 50 mg tablet Take 50 mg by mouth every 6 (six) hours as needed.      traZODone (DESYREL) 150 MG tablet Take 300 mg by mouth nightly.      acetaminophen (TYLENOL) 325 MG tablet Take 650 mg by mouth every 6 (six) hours as needed for Pain.      jiagvoc-xkjwnps-esiskai-terbin 4-2-1-4 % Soln Apply  topically. Right toe nail      HYDROcodone-acetaminophen (NORCO) 5-325 mg per tablet Take 1 tablet by mouth every 12 (twelve) hours as needed.      norethindrone-e.estradiol-iron (LO LOESTRIN FE ORAL) Take by mouth every evening.      ondansetron (ZOFRAN) 4 MG tablet Take 1 tablet (4 mg total) by mouth 2 (two) times daily. (Patient taking differently: Take 4 mg by mouth every 12 (twelve) hours as needed.) 30 tablet 1    oxyCODONE-acetaminophen (PERCOCET) 5-325 mg per tablet Take 1 tablet by mouth every 4 (four) hours as needed for Pain. 25 tablet 0    pantoprazole (PROTONIX) 40 MG tablet Take 40 mg by mouth once daily.         Physical Exam  Current Vitals:  Vitals:    04/26/23 0530   BP: (!) 99/57   Pulse: 66   Resp: 18   Temp: 98 °F (36.7 °C)       Physical Exam:  General: AO x4  HEENT: PERRL, EOMI  CV: RRR  Lungs: no respiratory distress  Abdomen: soft, non tender    Neurological Exam    MENTAL STATUS EXAM:  Level of alertness: Alert  Level of attention: Attentive w/out deficit  Orientation/Awareness: intact to person, place, time, situation  Language: fluent. Comprehension/repetition/naming intact    CRANIAL NERVE EXAM:  II/III: fundoscopic exam deferred, PERRL; visual fields full to confrontation  III/IV/VI: EOMI w/out evidence of nystagmus, strabismus, or evoked diplopia  V: no deficits appreciated to light touch  VII: no facial asymmetry noted  VIII: no deficits in hearing bilaterally  IX/X: palate @ ML and raises symmetrically  XI: shoulder shrug 5/5 bilaterally; head turn 5/5 bilaterally  XII: tongue to midline w/out asymmetry  No dysarthria noted on exam.    MOTOR EXAM:  Bulk and Tone: normal throughout  Strength is 5/5 proximally and distally in upper and lower extremities without deficits.  No pronator drift in bilateral UE. No tremor either at rest or with intention.    REFLEXES:  Masseter (CN V) Not Tested  2+ in bilateral upper and lower extremities, no Gaston's, no clonus. Downgoing  plantars.    SENSORY EXAM  Light touch intact throughout in upper and lower extremities without deficits.    COORDINATION/CEREBELLAR EXAM:  FTN: no dysmetria or other signs of appendicular ataxia  HTS: No evidence of appendicular ataxia    GAIT:  Deferred for safety.    NIH Stroke Scale      Date: 2023  Time: 1145 am  Person Administering Scale: Priti Rolle MD    Administer stroke scale items in the order listed. Record performance in each category after each subscale exam. Do not go back and change scores. Follow directions provided for each exam technique. Scores should reflect what the patient does, not what the clinician thinks the patient can do. The clinician should record answers while administering the exam and work quickly. Except where indicated, the patient should not be coached (i.e., repeated requests to patient to make a special effort).      1a  Level of consciousness: 0=alert; keenly responsive   1b. LOC questions:  0=Answers both tasks correctly   1c. LOC commands: 0=Answers both tasks correctly   2.  Best Gaze: 0=normal   3.  Visual: 0=No visual loss   4. Facial Palsy: 0=Normal symmetric movement   5a.  Motor left arm: 0=No drift, limb holds 90 (or 45) degrees for full 10 seconds   5b.  Motor right arm: 0=No drift, limb holds 90 (or 45) degrees for full 10 seconds   6a. motor left le=No drift, limb holds 90 (or 45) degrees for full 10 seconds   6b  Motor right le=No drift, limb holds 90 (or 45) degrees for full 10 seconds   7. Limb Ataxia: 0=Absent   8.  Sensory: 0=Normal; no sensory loss   9. Best Language:  0=No aphasia, normal   10. Dysarthria: 0=Normal   11. Extinction and Inattention: 0=No abnormality    Total:   0       Laboratory Data & Studies    Recent Labs   Lab 23  1336 23  0436   WBC 5.25 5.24   HGB 15.1 12.4    238   MCV 90 89       Recent Labs   Lab 23  1336 23  0436    137   K 2.9* 4.4    114*   CO2 18* 19*   BUN 18 12    GLU 95 101   CALCIUM 9.2 8.4*   MG  --  1.6   PHOS  --  3.2       Recent Labs   Lab 04/25/23  1336 04/26/23  0436   PROT 7.7 6.1   ALBUMIN 4.0 3.3*   BILITOT 0.3 0.3   AST 18 14   ALT 16 15   ALKPHOS 56 50*       Recent Labs   Lab 04/25/23  1336 04/26/23  0436   INR 1.0  1.3* 1.0   APTT  --  28.5       Recent Labs   Lab 04/25/23  1336   HGBA1C 4.4   CHOL 245*   TRIG 38   LDLCALC 146.4   HDL 91*   TSH 1.106       Microbiology:  Microbiology Results (last 7 days)       ** No results found for the last 168 hours. **            Imaging:  MRI Brain Without Contrast    Result Date: 4/25/2023  EXAM: MRI BRAIN WITHOUT CONTRAST CLINICAL HISTORY: Transient ischemic attack (TIA); . COMPARISON: CTA head and neck performed earlier today.  Brain MRI without and with contrast dated 12/19/2022 FINDINGS: Intracranial contents:There is no acute abnormality.  Specifically, there are no regions of restricted diffusion to suggest acute infarction.  There is no hemorrhage.  There is no mass.  Brain volume, ventricular size and position are normal.  There is a tiny linear focus of FLAIR hyperintense signal in the left paramedian sharee.  This was present previously and corresponds to a small focus of enhancement on the prior studies and again could be related to a stable occult vascular malformation.  There is no new abnormality in there are no other regions of signal abnormality in the brain.  There is no abnormal extra-axial fluid collection.  The basilar cisterns are open.  Flow voids indicating patency are present in the major vessels at the base of the brain.  Comparison CTA also demonstrated patent vessels.  The cerebellar tonsils are just at the level of the foramen magnum.  Sellar structures are normal.  The orbits are normal. Extracranial contents, calvarium, soft tissues:Baseline marrow signal is normal.  The paranasal sinuses and mastoid air cells are clear.     There is no acute abnormality.  There is no hemorrhage, mass or  acute infarction.  There is no change compared to the prior brain MRI dated 12/19/2022. Electronically signed by: Anish Johnson MD Date:    04/25/2023 Time:    16:55    CTA Head and Neck (xpd)    Result Date: 4/25/2023  EXAMINATION: CTA HEAD AND NECK (XPD) CLINICAL HISTORY: Stroke/TIA, determine embolic source; TECHNIQUE: Non contrast low dose axial images were obtained thought the head.  CT angiogram was performed from the level of the ronny to the top of the head following the IV administration of 75 mL of Omnipaque 350.   Sagittal and coronal reconstructions and maximum intensity projection reconstructions were performed. Arterial stenosis percentages are based on NASCET measurement criteria. COMPARISON: Brain MRI dated 12/19/2022, MRI orbits dated 09/30/2019 FINDINGS: Head CT: There is no acute abnormality or change in the appearance of the brain compared to the prior study.  Brain volume, ventricular size and position are normal.  There is no hemorrhage or mass.  The gray-white interface is preserved.  There are no definite regions of abnormal attenuation in the brain.  There is a stable linear focus of enhancement in the left paramedian sharee.  This was present on prior study and may be related to a small capillary telangiectasia or cavernous malformation.  There is no other abnormal enhancement in the brain. The paranasal sinuses and mastoid air cells are clear.  The calvarium is normal. CTA Neck: Arch and great vessels: There is a conventional left-sided 3 vessel arch.  The aortic arch and the origins of the great vessels are all widely patent.  The included subclavian arteries are patent as well. Carotid arteries: Right Carotid artery: The right common, internal and external carotid arteries are normal in caliber and contour.  There is no stenosis, occlusion, thrombosis or dissection. Left Carotid artery: The left common, internal and external carotid arteries are also normal in caliber and contour  without thrombus, dissection, stenosis or occlusion. Vertebral arteries: The vertebral arteries are patent throughout their course in the neck and are normal in caliber and contour.  There is no thrombus, dissection, stenosis or occlusion. Other: The included lungs are clear without infiltrate or mass.  There is no superior mediastinal mass or pathologic lymphadenopathy.  The airway is patent.  Thyroid gland is normal.  Shotty lymph nodes in the neck are nonspecific. CTA Head: Anterior circulation: The petrous and cavernous segments of the internal carotid arteries are patent.  The supraclinoid internal carotid arteries are patent.  The carotid terminus is normal bilaterally.  There is normal branching into the anterior and middle cerebral arteries.  There is a small patent anterior communicating artery.  There is no large vessel occlusion, critical stenosis, thrombus, dissection or large aneurysm. Posterior circulation: The vertebral and basilar arteries are patent.  The basilar tip is normal.  There is fetal origin of the left posterior cerebral artery.  There is a small right-sided posterior communicating artery as well.  There is no large vessel occlusion or critical stenosis.  There is no thrombus, dissection or aneurysm. Dural sinuses, other: The dural sinuses are patent.     1. There is no acute abnormality.  There is no hemorrhage, mass or obvious acute infarction. 2. The cervical vertebral and carotid arteries are patent. 3. The intracranial arteries are patent without large vessel occlusion or critical stenosis.  It should be noted that MRI is more sensitive in the detection of subtle or acute nonhemorrhagic ischemic disease. 4. There is a stable small linear focus of enhancement in the left paramedian sharee, unchanged compared to prior studies and likely related to an occult vascular malformation such as capillary telangiectasia and/or cavernous malformation Electronically signed by: Anish Johnson MD  Date:    04/25/2023 Time:    13:59      Assessment:    Hemiplegic Migraine vs Transient Ischemic Attack  43-year-old woman with history of migraine headaches, bipolar disorder, MTHFR mutation, antithrombin 3 deficiency, anticoagulant long-term use on Eliquis who presented with right-sided facial droop and word-finding difficulty that started suddenly, was associated with unilateral headache and resolved less than an hour later.  Notably, patient reports she was taking the Eliquis only once a day. MRI brain was negative for acute stroke, and neuro exam is non focal. Admitted for workup and management of hemiplegic migraine vs TIA.    Stroke workup:  CTH:  No acute intracranial abnormality  CTA head and neck:  No large vessel occlusion or significant stenosis  MRI brain:  Negative for acute ischemic stroke  Risk factors: A1C, TSH, LDL  Echocardiogram:  Normal left ventricular systolic function, EF 52%, no PFO    Plan:  - Admitted to hospital medicine with q4 hour neuro checks, on telemetry, continuous pulse oximetry  - Diet after eval per SLP.  - Secondary stroke prevention with Eliquis 5 mg BID and atorvastatin 40 mg daily. Patient instructed to make follow up appointment with Hematology (Dr. Hurst)  - Risk factor stratification with HgbA1C, Fasting Lipid profile, TSH  - 2D echocardiogram as above  - Maintain Euthermia with Tylenol prn temp > 37.2 degrees C.  - Assessment for rehab with PT/OT/SLP evaluation and treatment.  - BP Goal <180/105  - continue Topamax 200 mg daily as migraine preventative, can start Nurtec 75 mg at the onset of the headache as abortive, not to be taken more than twice per week.  Patient has contraindication for triptans (history of TIA)  - Will follow along    DVT prophylaxis with chemo/SCD prophylaxis  Extensively discussed lifestyle modifications as prophylactic measures for stroke prevention including smoking cessation, adequate blood pressure management, healthy diet and regular  exercise.    [facility] at [phone] within 3 days from discharge.     Stroke education was provided including stroke risk factors modification and any acute neurological changes including weakness, confusion, visual changes to come straight to the ER.     All questions were answered.                              Thank you kindly for including us in the care of this patient. Please do not hesitate to contact us with any questions.      62 minutes of care time has been spent evaluating with the patient. Time includes chart review not limited to diagnostic imaging, labs, and vitals, patient assessment, discussion with family and nursing, current order evaluations, and new order entries.      Priti Rolle MD  Neurology

## 2023-04-26 NOTE — PT/OT/SLP PROGRESS
Physical Therapy      Patient Name:  Janeth Siddiqi   MRN:  [id number]    PT eval attempted at 1135 - with ST. PT re attempted but pt was discharged home prior to being seen.    
No

## 2023-05-17 DIAGNOSIS — M54.81 BILATERAL OCCIPITAL NEURALGIA: ICD-10-CM

## 2023-05-17 DIAGNOSIS — G43.109 MIGRAINE WITH AURA AND WITHOUT STATUS MIGRAINOSUS, NOT INTRACTABLE: Primary | ICD-10-CM

## 2023-05-17 DIAGNOSIS — D68.59 ANTITHROMBIN 3 DEFICIENCY: ICD-10-CM

## 2023-05-17 DIAGNOSIS — G45.9 TIA (TRANSIENT ISCHEMIC ATTACK): ICD-10-CM

## 2023-05-17 DIAGNOSIS — Z15.89 HX OF MTHFR MUTATION: ICD-10-CM

## 2023-06-05 ENCOUNTER — HOSPITAL ENCOUNTER (OUTPATIENT)
Dept: RADIOLOGY | Facility: HOSPITAL | Age: 43
Discharge: HOME | End: 2023-06-05
Attending: STUDENT IN AN ORGANIZED HEALTH CARE EDUCATION/TRAINING PROGRAM
Payer: MEDICAID

## 2023-06-05 DIAGNOSIS — M54.81 BILATERAL OCCIPITAL NEURALGIA: ICD-10-CM

## 2023-06-05 DIAGNOSIS — G45.9 TIA (TRANSIENT ISCHEMIC ATTACK): ICD-10-CM

## 2023-06-05 DIAGNOSIS — D68.59 ANTITHROMBIN 3 DEFICIENCY: ICD-10-CM

## 2023-06-05 DIAGNOSIS — Z15.89 HX OF MTHFR MUTATION: ICD-10-CM

## 2023-06-05 DIAGNOSIS — G43.109 MIGRAINE WITH AURA AND WITHOUT STATUS MIGRAINOSUS, NOT INTRACTABLE: ICD-10-CM

## 2023-06-05 PROCEDURE — A9585 GADOBUTROL INJECTION: HCPCS | Mod: PO | Performed by: STUDENT IN AN ORGANIZED HEALTH CARE EDUCATION/TRAINING PROGRAM

## 2023-06-05 PROCEDURE — 25500020 PHARM REV CODE 255: Mod: PO | Performed by: STUDENT IN AN ORGANIZED HEALTH CARE EDUCATION/TRAINING PROGRAM

## 2023-06-05 PROCEDURE — 70553 MRI BRAIN STEM W/O & W/DYE: CPT | Mod: TC,PO

## 2023-06-05 RX ORDER — GADOBUTROL 604.72 MG/ML
5 INJECTION INTRAVENOUS
Status: COMPLETED | OUTPATIENT
Start: 2023-06-05 | End: 2023-06-05

## 2023-06-05 RX ADMIN — GADOBUTROL 5 ML: 604.72 INJECTION INTRAVENOUS at 11:06

## 2023-06-24 ENCOUNTER — HOSPITAL ENCOUNTER (EMERGENCY)
Facility: HOSPITAL | Age: 43
Discharge: HOME | End: 2023-06-24
Attending: EMERGENCY MEDICINE
Payer: MEDICAID

## 2023-06-24 VITALS
OXYGEN SATURATION: 99 % | RESPIRATION RATE: 16 BRPM | DIASTOLIC BLOOD PRESSURE: 71 MMHG | HEIGHT: 64 IN | HEART RATE: 64 BPM | BODY MASS INDEX: 18.27 KG/M2 | SYSTOLIC BLOOD PRESSURE: 104 MMHG | TEMPERATURE: 98 F | WEIGHT: 107 LBS

## 2023-06-24 DIAGNOSIS — R07.9 CHEST PAIN: ICD-10-CM

## 2023-06-24 LAB
ANION GAP SERPL CALC-SCNC: 13 MMOL/L (ref 8–16)
B-HCG UR QL: NEGATIVE
BASOPHILS # BLD AUTO: 0.03 K/UL (ref 0–0.2)
BASOPHILS NFR BLD: 0.7 % (ref 0–1.9)
BUN SERPL-MCNC: 9 MG/DL (ref 6–20)
CALCIUM SERPL-MCNC: 9.2 MG/DL (ref 8.7–10.5)
CHLORIDE SERPL-SCNC: 106 MMOL/L (ref 95–110)
CO2 SERPL-SCNC: 18 MMOL/L (ref 23–29)
CREAT SERPL-MCNC: 0.8 MG/DL (ref 0.5–1.4)
D DIMER PPP IA.FEU-MCNC: <0.19 MG/L FEU
DIFFERENTIAL METHOD: NORMAL
EOSINOPHIL # BLD AUTO: 0 K/UL (ref 0–0.5)
EOSINOPHIL NFR BLD: 0.5 % (ref 0–8)
ERYTHROCYTE [DISTWIDTH] IN BLOOD BY AUTOMATED COUNT: 14.5 % (ref 11.5–14.5)
EST. GFR  (NO RACE VARIABLE): >60 ML/MIN/1.73 M^2
GLUCOSE SERPL-MCNC: 85 MG/DL (ref 70–110)
HCT VFR BLD AUTO: 43.1 % (ref 37–48.5)
HGB BLD-MCNC: 14.4 G/DL (ref 12–16)
IMM GRANULOCYTES # BLD AUTO: 0.02 K/UL (ref 0–0.04)
IMM GRANULOCYTES NFR BLD AUTO: 0.5 % (ref 0–0.5)
LYMPHOCYTES # BLD AUTO: 1.7 K/UL (ref 1–4.8)
LYMPHOCYTES NFR BLD: 41.3 % (ref 18–48)
MCH RBC QN AUTO: 29.6 PG (ref 27–31)
MCHC RBC AUTO-ENTMCNC: 33.4 G/DL (ref 32–36)
MCV RBC AUTO: 89 FL (ref 82–98)
MONOCYTES # BLD AUTO: 0.3 K/UL (ref 0.3–1)
MONOCYTES NFR BLD: 7.6 % (ref 4–15)
NEUTROPHILS # BLD AUTO: 2.1 K/UL (ref 1.8–7.7)
NEUTROPHILS NFR BLD: 49.4 % (ref 38–73)
NRBC BLD-RTO: 0 /100 WBC
PLATELET # BLD AUTO: 262 K/UL (ref 150–450)
PMV BLD AUTO: 10.1 FL (ref 9.2–12.9)
POTASSIUM SERPL-SCNC: 4.5 MMOL/L (ref 3.5–5.1)
RBC # BLD AUTO: 4.87 M/UL (ref 4–5.4)
SODIUM SERPL-SCNC: 137 MMOL/L (ref 136–145)
TROPONIN I SERPL DL<=0.01 NG/ML-MCNC: <0.006 NG/ML (ref 0–0.03)
WBC # BLD AUTO: 4.21 K/UL (ref 3.9–12.7)

## 2023-06-24 PROCEDURE — 80048 BASIC METABOLIC PNL TOTAL CA: CPT | Performed by: EMERGENCY MEDICINE

## 2023-06-24 PROCEDURE — 85379 FIBRIN DEGRADATION QUANT: CPT | Performed by: EMERGENCY MEDICINE

## 2023-06-24 PROCEDURE — 99285 EMERGENCY DEPT VISIT HI MDM: CPT | Mod: 25

## 2023-06-24 PROCEDURE — 93010 ELECTROCARDIOGRAM REPORT: CPT | Mod: ,,, | Performed by: SPECIALIST

## 2023-06-24 PROCEDURE — 81025 URINE PREGNANCY TEST: CPT | Performed by: EMERGENCY MEDICINE

## 2023-06-24 PROCEDURE — 84484 ASSAY OF TROPONIN QUANT: CPT | Performed by: EMERGENCY MEDICINE

## 2023-06-24 PROCEDURE — 93005 ELECTROCARDIOGRAM TRACING: CPT

## 2023-06-24 PROCEDURE — 36415 COLL VENOUS BLD VENIPUNCTURE: CPT | Performed by: EMERGENCY MEDICINE

## 2023-06-24 PROCEDURE — 93010 EKG 12-LEAD: ICD-10-PCS | Mod: ,,, | Performed by: SPECIALIST

## 2023-06-24 PROCEDURE — 85025 COMPLETE CBC W/AUTO DIFF WBC: CPT | Performed by: EMERGENCY MEDICINE

## 2023-06-24 NOTE — ED PROVIDER NOTES
Encounter Date: 6/24/2023    SCRIBE #1 NOTE: I, Claire Godfrey, am scribing for, and in the presence of,  Jayme Su MD.     History     Chief Complaint   Patient presents with    Chest Pain     X1 hr pta     Dizziness     Time seen by provider: 1:08 PM on 06/24/2023    Janeth Siddiqi is a 43 y.o. female who presents to the ED with an onset of left chest pain and nausea that began at 8 AM today when she woke up. Patient states she feels SOB when moving around and has worsening pain with movement. The patient denies cough, congestion, blood in the stool, vomiting, numbness or any other symptoms at this time. She has a PMHx of MTHFR heterozygous mutation, antithrombin III deficiency, PE, and gastroparesis. She has no pertinent PSHx. Patient is on Eliquis and birth control.    The history is provided by the patient.   Review of patient's allergies indicates:   Allergen Reactions    Folic acid     Iron analogues Anaphylaxis     Infusion only. Tolerates po    Nsaids (non-steroidal anti-inflammatory drug)      cannot take NSAIDs- upsets the stomach     Past Medical History:   Diagnosis Date    Anticoagulant long-term use     Antithrombin III deficiency 08/15/2017    Anxiety     B12 deficiency 08/15/2017    Bipolar disorder     Constipation     Eating disorder     Encounter for blood transfusion     Gastroparesis     MTHFR mutation 08/15/2017    Pancreatitis     PONV (postoperative nausea and vomiting)     Pulmonary embolism     Seizures     AS A CHILD    Vitamin D deficiency 08/15/2017     Past Surgical History:   Procedure Laterality Date    APPENDECTOMY      bowel obstruction      x 2 COLON RESECTIONS    LAPAROSCOPIC SALPINGO-OOPHORECTOMY Right 12/15/2021    Procedure: SALPINGO-OOPHORECTOMY, LYSIS OF ADHESIONS ;  Surgeon: Gigi Oquendo MD;  Location: [facility];  Service: OB/GYN;  Laterality: Right;    LAPAROSCOPY N/A 12/15/2021    Procedure: LAPAROSCOPY;  Surgeon: Gigi Oquendo MD;   Location: [facility];  Service: OB/GYN;  Laterality: N/A;    lysis of adhesions      LYSIS OF ADHESIONS N/A 9/28/2022    Procedure: LYSIS, ADHESIONS;  Surgeon: Peter Perez MD;  Location: [facility] OR;  Service: General;  Laterality: N/A;    OVARIAN CYST REMOVAL      REVISION OF SCAR N/A 9/28/2022    Procedure: LAPAROTOMY OF SCAR TISSUE REVISION;  Surgeon: Gigi Oquendo MD;  Location: [facility] OR;  Service: OB/GYN;  Laterality: N/A;    uterine polyp removal       Family History   Problem Relation Age of Onset    Cancer Mother         breast cancer    Hyperlipidemia Father     Arthritis Father     Cancer Maternal Grandmother         breast and lung    Colon cancer Maternal Grandmother 62    Prostate cancer Maternal Grandfather     Prostate cancer Paternal Grandfather      Social History     Tobacco Use    Smoking status: Never    Smokeless tobacco: Never   Substance Use Topics    Alcohol use: No    Drug use: No     Review of Systems   Constitutional:  Negative for fever.   HENT:  Negative for congestion and sore throat.    Respiratory:  Positive for shortness of breath. Negative for cough.    Cardiovascular:  Positive for chest pain.   Gastrointestinal:  Positive for nausea. Negative for blood in stool and vomiting.   Genitourinary:  Negative for dysuria.   Musculoskeletal:  Negative for back pain.   Skin:  Negative for rash.   Neurological:  Negative for weakness and numbness.   Hematological:  Does not bruise/bleed easily.     Physical Exam     Initial Vitals [06/24/23 1303]   BP Pulse Resp Temp SpO2   108/63 82 18 97.6 °F (36.4 °C) 100 %      MAP       --         Physical Exam    Nursing note and vitals reviewed.  Constitutional: She appears well-developed and well-nourished. She is not diaphoretic. No distress.   HENT:   Head: Normocephalic and atraumatic.   Mouth/Throat: Oropharynx is clear and moist.   Eyes: Conjunctivae are normal.   Neck: Neck supple.   Cardiovascular:  Normal rate, regular rhythm, normal  heart sounds and intact distal pulses.     Exam reveals no gallop and no friction rub.       No murmur heard.  Pulses:       Dorsalis pedis pulses are 2+ on the right side and 2+ on the left side.        Posterior tibial pulses are 2+ on the right side and 2+ on the left side.   Pulmonary/Chest: Breath sounds normal. She has no wheezes. She has no rhonchi. She has no rales. She exhibits no tenderness and no crepitus.   Abdominal: Abdomen is soft. She exhibits no distension. There is no abdominal tenderness.   Musculoskeletal:         General: Normal range of motion.      Cervical back: Neck supple.      Right upper leg: No edema or tenderness.      Left upper leg: No edema or tenderness.      Right lower leg: No tenderness. No edema.      Left lower leg: No tenderness. No edema.     Neurological: She is alert and oriented to person, place, and time. She has normal strength. No cranial nerve deficit or sensory deficit.   Cranial nerves III through XII grossly intact. 5/5 strength with intact sensation to BUE's and BLE's.   Skin: No rash noted. No erythema.   Psychiatric: Her speech is normal.       ED Course   Procedures  Labs Reviewed   BASIC METABOLIC PANEL - Abnormal; Notable for the following components:       Result Value    CO2 18 (*)     All other components within normal limits   CBC W/ AUTO DIFFERENTIAL   TROPONIN I   D DIMER, QUANTITATIVE   PREGNANCY TEST, URINE RAPID    Narrative:     Specimen Source->Urine          Imaging Results              X-Ray Chest 1 View (Final result)  Result time 06/24/23 14:23:13      Final result by Sirena Ceron MD (06/24/23 14:23:13)                   Impression:      No acute cardiopulmonary disease      Electronically signed by: Sirena Ceron MD  Date:    06/24/2023  Time:    14:23               Narrative:    EXAMINATION:  XR CHEST 1 VIEW    CLINICAL HISTORY:  CP;    TECHNIQUE:  Single frontal view of the chest was  performed.    COMPARISON:  03/01/2023    FINDINGS:  The heart is not enlarged.  The lungs are well expanded and clear.  The costophrenic sulci are sharp.                                  (radiology reading, visualized by me)       Medications - No data to display  Medical Decision Making:   History:   Old Medical Records: I decided to obtain old medical records.  Independently Interpreted Test(s):   I have ordered and independently interpreted X-rays - see prior notes.  I have ordered and independently interpreted EKG Reading(s) - see prior notes  Clinical Tests:   Lab Tests: Ordered and Reviewed  Radiological Study: Ordered and Reviewed  Medical Tests: Ordered and Reviewed        Scribe Attestation:   Scribe #1: I performed the above scribed service and the documentation accurately describes the services I performed. I attest to the accuracy of the note.      ED Course as of 06/24/23 1731   Sat Jun 24, 2023   1323 EKG:  Normal sinus rhythm at a rate of 70.  Normal intervals.  Normal axis.  No significant ST or T wave changes suggesting acute ischemia or infarction.  (Independently interpreted by me) [MR]   1404 CXR:  NAD. (Independently interpreted by me) [MR]   1443 Low risk HEART score of 3. [MR]      ED Course User Index  [MR] Jayme Su MD               I, Dr. Jayme Su, personally performed the services described in this documentation. All medical record entries made by the scribe were at my direction and in my presence.  I have reviewed the chart and agree that the record reflects my personal performance and is accurate and complete. Jyame Su MD.  5:30 PM 06/24/2023    Janeth Sididqi is a 43 y.o. female presenting with chest pain starting earlier this morning in this well-appearing patient.  She does have a history pulmonary embolus and is on anticoagulation currently.  D-dimer is negative and I doubt PE.  I do not think CT of the chest is indicated.  I have very low  suspicion for aortic dissection.  I doubt ACS.  Cardiac biomarker is negative with nonischemic EKG.  I do not think she requires admission for serial troponin evaluation or cardiac monitoring.  She is appropriate for outpatient follow-up.  Detailed return precautions reviewed.    Clinical Impression:   Final diagnoses:  [R07.9] Chest pain        ED Disposition Condition    Discharge Stable          ED Prescriptions    None       Follow-up Information       Follow up With Specialties Details Why Contact Info    Rashaad Acevedo MD Cardiology, Cardiovascular Disease  Cardiology, next week [address] [facility]  [address] [place] [id number]  [phone]               Jayme Su MD  06/24/23 3195

## 2023-06-30 ENCOUNTER — PATIENT OUTREACH (OUTPATIENT)
Dept: EMERGENCY MEDICINE | Facility: HOSPITAL | Age: 43
End: 2023-06-30
Payer: MEDICAID

## 2023-06-30 NOTE — PROGRESS NOTES
Susan Farris  ED Navigator  Emergency Department    Project: [facility] ED Navigator  Role: Community Health Worker    Date: 06/30/2023  Patient Name: Janeth Siddiqi  MRN: [id number]  PCP: [facility]    Assessment:     Janeth Siddiqi is a 43 y.o. female who has presented to ED for Chest Pain. Patient has visited the ED 1 times in the past 3 months. Patient did not contact PCP.     ED Navigator Initial Assessment    ED Navigator Enrollment Documentation  Consent to Services  Does patient consent to completing the assessment?: Yes  Contact  Method of Initial Contact: Phone  Transportation  Does the patient have issues with Transportation?: No  Does the patient have transportation to and from healthcare appointments?: Yes  Insurance Coverage  Do you have coverage/adequate coverage?: Yes  Type/kind of coverage: Medicaid/[employer]  Is patient able to afford co-pays/deductibles?: Yes  Is patient able to afford HME or supplies?: Yes  Does patient have an established [facility] PCP?: Yes  Able to access?: Yes  Does the patient have a lack of adequate coverage?: No  Specialist Appointment  Did the patient come to the ED to see a specialist?: No  Does the patient have a pending specialist referral?: No  Does the patient have a specialist appointment made?: No  PCP Follow Up Appointment  Has the patient had an appointment with a primary care provider in the past year?: No  Does the patient have a follow up appontment with a PCP?: No  When was the last time you saw your PCP?: 6/30/22  Why does the patient not have a follow up scheduled?: Other (see comments)  Medications  Is patient able to afford medication?: Yes  Is patient unable to get medication due to lack of transportation?: No  Psychological  Does the patient have psycho-social concerns?: Yes  What concerns does the patient have?: Anxiety and/or Depression  Food  Does the patient have concerns about food?:  No  Communication/Education  Does the patient have limited English proficiency/English not primary language?: No  Does patient have low literacy and/or low health literacy?: Yes  Does patient have concerns with care?: No  Does patient have dissatisfaction with care?: No  Other Financial Concerns  Does the patient have immediate financial distress?: No  Does the patient have general financial concerns?: No  Other Social Barriers/Concerns  Does the patient have any additional barriers or concerns?: Other (see comments)  Primary Barrier  Barriers identified: Cognitive barrier (health literacy, language and communication, etc.)  Root Cause of ED Utilization: Patient Knowledge/Low Health Literacy  Plan to address Patient Knowledge/Low Health Literacy: Provided information for [facility] 24/7 Nurse triage line [phone] or 1-866-[facility] ([phone])  Next steps: Provided Education  Was education/educational materials provided surrounding PCP services/creating a medical home?: Yes Was education verbal or written?: Written     Was education/educational materials provided surrounding low cost, healthy foods?: Yes Was education verbal or written?: Written     Was education/educational materials provided surrounding other items? If so, use comment to explain.: Yes Was education verbal or written?: Written   Plan: Provided information for [facility] 24/7 Nurse triage line, [phone] or 1-[facility] ([phone])  Expected Date of Follow Up 1: 7/28/23         Social History     Socioeconomic History    Marital status: Single   Tobacco Use    Smoking status: Never    Smokeless tobacco: Never   Substance and Sexual Activity    Alcohol use: No    Drug use: No     Social Determinants of Health     Financial Resource Strain: Low Risk     Difficulty of Paying Living Expenses: Not hard at all   Food Insecurity: No Food Insecurity    Worried About Running Out of Food in the Last Year: Never true    Ran Out of Food  in the Last Year: Never true   Transportation Needs: No Transportation Needs    Lack of Transportation (Medical): No    Lack of Transportation (Non-Medical): No   Stress: Stress Concern Present    Feeling of Stress : To some extent   Social Connections: Unknown    Frequency of Communication with Friends and Family: Three times a week    Frequency of Social Gatherings with Friends and Family: Three times a week    Marital Status: Never    Housing Stability: Unknown    Unable to Pay for Housing in the Last Year: No    Unstable Housing in the Last Year: No       Plan:   Spoke with patient on the telephone and completed initial enrollment.  Patient reported she is doing a little better since her ED visit.  Patient stated she has not followed up with her Cardiologist as was recommended but will call to schedule an appointment.  Patient reported she has a PCP and is closely followed by several specialists for various health issues.  Patient stated she needs to find a new GI doctor.  Patient reported no concerns with food, housing, utilities, or transportation.  Patient stated she does not drink alcohol or smoke cigarettes.  Patient stated she feels she needs to see someone for depression/anxiety.  Patient was given education on ([facility] information, [facility] Virtual [facility] information, and Heart healthy diet tips), [facility] Nurse Triage line, behavioral health resources, and list of GI doctors from the [employer] web site.    Susan Farris  ED Navigator

## 2023-07-17 NOTE — PROGRESS NOTES
[facility]  PROGRESS NOTE      Subjective:       Patient ID:   NAME: Janeth Siddiqi : 1980     43 y.o. female    Referring Doc: Lizy Diamond  Other Physicians: Jere Noriega; Shazia; Az/Pearl    Chief Complaint:  Anemia/b12 f/u    History of Present Illness:     Patient returns today for a regularly scheduled follow-up visit.  The patient is here with her mom.     She is on eliquis. No excessive bleeding or bruising     No CP, SOB, HA's or N/V. She had possible TIA in 2023 and was admitted at [facility] and has since been seeing Dr Ramirez with neurology; she is atorvastatin, topamax and Nurtec      Discussed covid 19 precautions      ROS:   GEN: normal without any fever, night sweats or weight loss  HEENT: normal with no HA's, sore throat, stiff neck, changes in vision  CV: normal with no CP, SOB, PND, KRAUSE or orthopnea  PULM: normal with no SOB, cough, hemoptysis, sputum or pleuritic pain  GI: intermittent abdominal discomfort but better since having surgery   : normal with no hematuria, dysuria  BREAST: normal with no mass, discharge, pain  SKIN: normal with no rash, erythema, bruising, or swelling    Allergies:  Review of patient's allergies indicates:   Allergen Reactions    Iron analogues Anaphylaxis     Infusion only. Tolerates po    Bactrim [sulfamethoxazole-trimethoprim] Swelling    Lidocaine Swelling    Nsaids (non-steroidal anti-inflammatory drug)      cannot take NSAIDs- upsets the stomach    Zofran [ondansetron hcl (pf)] Nausea Only       Medications:    Current Outpatient Medications:     apixaban (ELIQUIS) 5 mg Tab, Take 1 tablet (5 mg total) by mouth 2 (two) times daily. (Patient taking differently: Take 5 mg by mouth every evening.), Disp: 60 tablet, Rfl: 3    ashwagandha root extract 300 mg Cap, Take 300 mg by mouth Daily., Disp: , Rfl:     atorvastatin (LIPITOR) 40 MG tablet, Take 1 tablet (40 mg total) by mouth once daily., Disp: 90 tablet,  Rfl: 3    sokirow-lqyefix-ybnhljk-terbin 4-2-1-4 % Soln, Apply topically. Right toe nail, Disp: , Rfl:     gabapentin (NEURONTIN) 300 MG capsule, Take 400 mg by mouth every evening., Disp: , Rfl:     HYDROcodone-acetaminophen (NORCO) 5-325 mg per tablet, Take 1 tablet by mouth every 12 (twelve) hours as needed., Disp: , Rfl:     hydrocortisone 2.5 % cream, Apply topically 2 (two) times daily as needed., Disp: , Rfl:     hydrOXYzine (ATARAX) 50 MG tablet, Take 50 mg by mouth nightly as needed., Disp: , Rfl:     LINZESS 290 mcg Cap, Take 290 mcg by mouth once daily. , Disp: , Rfl: 1    norethindrone-ethinyl estradiol (JUNEL FE 1/20) 1 mg-20 mcg (21)/75 mg (7) per tablet, Take 1 tablet by mouth once daily., Disp: , Rfl:     topiramate (TOPAMAX) 200 MG Tab, Take 200 mg by mouth once daily., Disp: , Rfl:     traMADoL (ULTRAM) 50 mg tablet, Take 50 mg by mouth every 6 (six) hours as needed., Disp: , Rfl:     traZODone (DESYREL) 150 MG tablet, Take 300 mg by mouth nightly., Disp: , Rfl:     acetaminophen (TYLENOL) 325 MG tablet, Take 650 mg by mouth every 6 (six) hours as needed for Pain., Disp: , Rfl:     ibuprofen (ADVIL,MOTRIN) 800 MG tablet, Take 1 tablet (800 mg total) by mouth 3 (three) times daily. (Patient not taking: Reported on 7/18/2023), Disp: 40 tablet, Rfl: 2    norethindrone-e.estradiol-iron (LO LOESTRIN FE ORAL), Take by mouth every evening., Disp: , Rfl:     ondansetron (ZOFRAN-ODT) 8 MG TbDL, Take 1 tablet (8 mg total) by mouth every 8 (eight) hours as needed. (Patient not taking: Reported on 7/18/2023), Disp: 15 tablet, Rfl: 0  No current facility-administered medications for this visit.    Facility-Administered Medications Ordered in Other Visits:     diphenhydrAMINE injection 25 mg, 25 mg, Intravenous, Q6H PRN, Aric Driver MD    HYDROmorphone injection 0.5 mg, 0.5 mg, Intravenous, Q5 Min PRN, Aric Driver MD, 0.5 mg at 09/28/22 0958    ondansetron injection 4 mg, 4 mg, Intravenous, Daily  "PRN, Aric Driver MD    prochlorperazine injection Soln 5 mg, 5 mg, Intravenous, Q30 Min PRN, Aric Driver MD    sodium chloride 0.9% bolus 250 mL, 250 mL, Intravenous, Once, Aric Driver MD    PMHx/PSHx Updates:  See patient's last visit with me on 1/18/2023  See H&P on 12/7/16        Pathology:  none          Objective:     Vitals:  Blood pressure 98/63, pulse 78, temperature 97.5 °F (36.4 °C), resp. rate 16, height 5' 4" (1.626 m), weight 49.9 kg (110 lb).    Physical Examination:   GEN: no apparent distress, comfortable; AAOx3  HEAD: atraumatic and normocephalic  EYES: no pallor, no icterus, PERRLA  ENT: OMM, no pharyngeal erythema, external ears WNL; no nasal discharge; no thrush  NECK: no masses, thyroid normal, trachea midline, no LAD/LN's, supple  CV: RRR with no murmur; normal pulse; normal S1 and S2; no pedal edema  CHEST: Normal respiratory effort; CTAB; normal breath sounds; no wheeze or crackles  ABDOM:  nondistended; soft; normal bowel sounds; no rebound/guarding;   MUSC/Skeletal: ROM normal; no crepitus; joints normal; no deformities or arthropathy  EXTREM: no clubbing, cyanosis, inflammation or swelling  SKIN: no rashes, lesions, ulcers, petechiae or subcutaneous nodules  : no reza  NEURO: grossly intact; motor/sensory WNL; AAOx3; no tremors  PSYCH: normal mood, affect and behavior  LYMPH: normal cervical, supraclavicular, axillary and groin LN's            Labs:               Lab Results   Component Value Date    WBC 4.21 06/24/2023    HGB 14.4 06/24/2023    HCT 43.1 06/24/2023    MCV 89 06/24/2023     06/24/2023     BMP  Lab Results   Component Value Date     06/24/2023    K 4.5 06/24/2023     06/24/2023    CO2 18 (L) 06/24/2023    BUN 9 06/24/2023    CREATININE 0.8 06/24/2023    CALCIUM 9.2 06/24/2023    ANIONGAP 13 06/24/2023    ESTGFRAFRICA >60.0 07/20/2022    EGFRNONAA >60.0 07/20/2022      Lab Results   Component Value Date    IRON 141 12/01/2022    " TIBC 242 (L) 12/01/2022    FERRITIN 45 12/01/2022            Lab Results   Component Value Date    OZOSUAEB70 448 06/16/2023     Lab Results   Component Value Date    FOLATE 15.6 06/16/2023             Radiology/Diagnostic Studies:    MRI Brain w/WO 6/5/2023:    IMPRESSION:     Small focal area of signal alteration and postcontrast enhancement within the upper aspect of the sharee towards the left of midline potentially representing an occult vascular malformation, stable compared to multiple prior studies.     No acute intracranial abnormality.    CTA head 4/25/2023:    Impression:     1. There is no acute abnormality.  There is no hemorrhage, mass or obvious acute infarction.  2. The cervical vertebral and carotid arteries are patent.  3. The intracranial arteries are patent without large vessel occlusion or critical stenosis.  It should be noted that MRI is more sensitive in the detection of subtle or acute nonhemorrhagic ischemic disease.  4. 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 INDICATION: Obstruction. COMPARISON: KUB 1/1/14. FINDINGS: Supine and upright radiographs of the abdomen demonstrate a nonobstructive bowel gas pattern. There is no free air. There is no pneumatosis. There is no portal venous gas. There is calcification. Lung bases are clear. There is no osseous abnormality.    1.Nonobstructed bowel gas pattern. Electronically signed by: KELBY DENNIS MD Date:     07/21/17 Time:    13:49     Ct Abdomen Pelvis With Contrast    Result Date: 7/21/2017  Procedure: CT of the abdomen and pelvis with IV contrast. Technique: Axial images of the abdomen and pelvis were obtained with intravenous contrast administration. Coronal and sagittal  reconstructions were provided. Total DLP was 691 mGy-cm.  Dose lowering technique, automated exposure control, was utilized for this exam. History: Left-sided abdominal pain and vomiting. Comparison: CT of the abdomen and pelvis 7/31/2015. Findings: The bilateral lung bases are clear. The heart is normal in size. There is a 7 mm focus of arterial enhancement within segment 4B of the liver which is isodense to the adjacent liver on delayed phase. This most consistent with a flash filling hemangioma. The portal vein is patent. The spleen, pancreas, and adrenal glands are normal. Bilateral kidneys are normal. There is no hydronephrosis or nephrolithiasis. The stomach and small bowel are decompressed. The appendix is not visualized, however there is no right lower quadrant inflammatory stranding. The colon is normal. The uterus and adnexa are normal for age. Urinary bladder is normal. There is trace physiologic free fluid in the lower pelvis. There is no pelvic or retroperitoneal adenopathy. The abdominal aorta is non-aneurysmal. There is no lytic or blastic osseous lesions.     No acute abnormality of the abdomen and pelvis. Electronically signed by: KELBY DENNIS MD Date:     07/21/17 Time:    15:37       I have reviewed all available lab results and radiology reports.    Assessment/Plan:   (1) 43 y.o. female with diagnosis of iron deficiency anemia and B12 deficiency  - she was previously on oral iron   - latest ferritin is 124 and much better  - B12 adeqaute  - hgb is 14.0 and WNL and stable    2/28/2022:  - latest hgb adequate at 13.2    6/20/2022:  - no recent CBc labs since Feb 2022 1/18/2023:  - latest CBC adequate  - iron panel adequate  - continued on oral iron  - B12 at 384; folate a little low  - add folic acid po daily and recommend continue B12 monthly    7/18/2023:  - latest CBC adequate  - iron panel, b12/folate adequate    (2) prior Pulmonary emboli with prior use of OCP's  - on oral anticoagulant drug  Eliquis  - underlying MTHFR-A heterozygous and ATIII deficiency issues  - followed by Dr Mccray with Pulm and he ordered several tests and  workup for cystic fibrosis which was negative    2/28/2022:  - continued on eliquis  - no excessive bleeding or brusiing    6/20/2022:  - continued on eliquis  - no excessive bleeding or bruising    1/18/2023:  - she is continued on eliquis  - no excessive bleeding or bruising    7/18/2023:  - continued on eliquis  - no excessive bleeding or bruising    (3) Crohn's-like disorder/pancreatitis - followed by Dr Noriega with GI in past and now Dr Aric Sinclair with GI at [facility]    (4) Chronic GYN issues with prior bleeding (resolved) - followed by Dr Gigi Oquendo with GYN    2/28/2022:  - She had surgery on right ovary in mid-Dec 2021; she has had some abdominal discomfort and occasional nausea since that time. Dr Willis did the surgery. They suspect she is having a lot of scar tissue issues. She has been on antibiotics.  - She is seeing Dr Greenfield with GI and is seeing him again in 2 wees.     6/20/2022:  - She previously had surgery on right ovary in mid-Dec 2021; she has had some residual stomach issues. She is planning to have another surgery in the near future with Dr Perez and Dr Willis     1/18/2023:  - she had surgery in Sept 2022 with adhesions and hernia repair    (5) prior Vit D deficiency    (6) She saw Dr Khoobehi with plastics in [place] and had a Voluma skin filler without any difficulties     (7) SVT - seeing Dr Bernardo  and now on medications    (8) TIA    7/18/2023:  -  She had possible TIA in April 2023 and was admitted at [facility] and has since been seeing Dr Ramirez with neurology; she is atorvastatin, topamax and Nurtec        1. TIA (transient ischemic attack)        2. Other iron deficiency anemia        3. B12 deficiency        4. MTHFR mutation            PLAN:  1. Check labs every 3 months ; continue eliquis; folic acid po daily and  b12 monthly  2. F/u with PCP , GYN, and pulm  3. F/u with Dr Greenfield with [facility] as directed  4. F/u with GYN with Dr Oquendo  5. hold her eliquis 2-3 days prior to any surgery; she is considered a higher risk than the normal population for clot events  6. F/u with neurology as directed     RTC  in 6 months    Fax note to Jere Mccray, Khoobehi, Lizy Diamond, Yahir; Peter Kang; James; Ck Savage (PCP)    Discussion:       COVID-19 Discussion:    I had long discussion with patient and any applicable family about the COVID-19 coronavirus epidemic and the recommended precautions with regard to cancer and/or hematology patients. I have re-iterated the CDC recommendations for adequate hand washing, use of hand -like products, and coughing into elbow, etc. In addition, especially for our patients who are on chemotherapy and/or our otherwise immunocompromised patients, I have recommended avoidance of crowds, including movie theaters, restaurants, churches, etc. I have recommended avoidance of any sick or symptomatic family members and/or friends. I have also recommended avoidance of any raw and unwashed food products, and general avoidance of food items that have not been prepared by themselves. The patient has been asked to call us immediately with any symptom developments, issues, questions or other general concerns.     I have explained all of the above in detail and the patient understands all of the current recommendation(s). I have answered all of their questions to the best of my ability and to their complete satisfaction.   The patient is to continue with the current management plan.            Electronically signed by Miguel Hurst MD

## 2023-07-18 ENCOUNTER — OFFICE VISIT (OUTPATIENT)
Dept: HEMATOLOGY/ONCOLOGY | Facility: CLINIC | Age: 43
End: 2023-07-18
Payer: MEDICAID

## 2023-07-18 VITALS
BODY MASS INDEX: 18.78 KG/M2 | RESPIRATION RATE: 16 BRPM | WEIGHT: 110 LBS | TEMPERATURE: 98 F | DIASTOLIC BLOOD PRESSURE: 63 MMHG | SYSTOLIC BLOOD PRESSURE: 98 MMHG | HEIGHT: 64 IN | HEART RATE: 78 BPM

## 2023-07-18 DIAGNOSIS — G45.9 TIA (TRANSIENT ISCHEMIC ATTACK): Primary | ICD-10-CM

## 2023-07-18 DIAGNOSIS — Z15.89 MTHFR MUTATION: ICD-10-CM

## 2023-07-18 DIAGNOSIS — D50.8 OTHER IRON DEFICIENCY ANEMIA: ICD-10-CM

## 2023-07-18 DIAGNOSIS — E53.8 B12 DEFICIENCY: ICD-10-CM

## 2023-07-18 PROCEDURE — 3074F SYST BP LT 130 MM HG: CPT | Mod: CPTII,S$GLB,, | Performed by: INTERNAL MEDICINE

## 2023-07-18 PROCEDURE — 1159F PR MEDICATION LIST DOCUMENTED IN MEDICAL RECORD: ICD-10-PCS | Mod: CPTII,S$GLB,, | Performed by: INTERNAL MEDICINE

## 2023-07-18 PROCEDURE — 3044F PR MOST RECENT HEMOGLOBIN A1C LEVEL <7.0%: ICD-10-PCS | Mod: CPTII,S$GLB,, | Performed by: INTERNAL MEDICINE

## 2023-07-18 PROCEDURE — 3008F PR BODY MASS INDEX (BMI) DOCUMENTED: ICD-10-PCS | Mod: CPTII,S$GLB,, | Performed by: INTERNAL MEDICINE

## 2023-07-18 PROCEDURE — 1160F PR REVIEW ALL MEDS BY PRESCRIBER/CLIN PHARMACIST DOCUMENTED: ICD-10-PCS | Mod: CPTII,S$GLB,, | Performed by: INTERNAL MEDICINE

## 2023-07-18 PROCEDURE — 99213 PR OFFICE/OUTPT VISIT, EST, LEVL III, 20-29 MIN: ICD-10-PCS | Mod: S$GLB,,, | Performed by: INTERNAL MEDICINE

## 2023-07-18 PROCEDURE — 3078F DIAST BP <80 MM HG: CPT | Mod: CPTII,S$GLB,, | Performed by: INTERNAL MEDICINE

## 2023-07-18 PROCEDURE — 3078F PR MOST RECENT DIASTOLIC BLOOD PRESSURE < 80 MM HG: ICD-10-PCS | Mod: CPTII,S$GLB,, | Performed by: INTERNAL MEDICINE

## 2023-07-18 PROCEDURE — 1159F MED LIST DOCD IN RCRD: CPT | Mod: CPTII,S$GLB,, | Performed by: INTERNAL MEDICINE

## 2023-07-18 PROCEDURE — 3074F PR MOST RECENT SYSTOLIC BLOOD PRESSURE < 130 MM HG: ICD-10-PCS | Mod: CPTII,S$GLB,, | Performed by: INTERNAL MEDICINE

## 2023-07-18 PROCEDURE — 1160F RVW MEDS BY RX/DR IN RCRD: CPT | Mod: CPTII,S$GLB,, | Performed by: INTERNAL MEDICINE

## 2023-07-18 PROCEDURE — 3008F BODY MASS INDEX DOCD: CPT | Mod: CPTII,S$GLB,, | Performed by: INTERNAL MEDICINE

## 2023-07-18 PROCEDURE — 3044F HG A1C LEVEL LT 7.0%: CPT | Mod: CPTII,S$GLB,, | Performed by: INTERNAL MEDICINE

## 2023-07-18 PROCEDURE — 99213 OFFICE O/P EST LOW 20 MIN: CPT | Mod: S$GLB,,, | Performed by: INTERNAL MEDICINE

## 2023-07-18 RX ORDER — NORETHINDRONE ACETATE AND ETHINYL ESTRADIOL 1MG-20(21)
1 KIT ORAL DAILY
COMMUNITY

## 2023-07-28 ENCOUNTER — PATIENT OUTREACH (OUTPATIENT)
Dept: EMERGENCY MEDICINE | Facility: HOSPITAL | Age: 43
End: 2023-07-28

## 2023-07-31 PROBLEM — G45.9 TIA (TRANSIENT ISCHEMIC ATTACK): Status: RESOLVED | Noted: 2023-04-25 | Resolved: 2023-07-31

## 2023-08-02 ENCOUNTER — TELEPHONE (OUTPATIENT)
Dept: OTOLARYNGOLOGY | Facility: CLINIC | Age: 43
End: 2023-08-02
Payer: MEDICAID

## 2023-08-02 NOTE — TELEPHONE ENCOUNTER
----- Message from Carson Mcdonnellne sent at 8/2/2023  3:22 PM CDT -----  Type:  Sooner Appointment Request    Caller is requesting a sooner appointment.  Caller declined first available appointment listed below.  Caller will not accept being placed on the waitlist and is requesting a message be sent to doctor.    Name of Caller:  pt  When is the first available appointment?  N/A  Symptoms:  Really bad dizzy spells/water in ear/very painful  Best Call Back Number:  [phone]  Additional Information:  pt is calling to be seen ASAP. Please call back to advise. Thanks!

## 2023-08-02 NOTE — TELEPHONE ENCOUNTER
----- Message from Carson Mcdonnellne sent at 8/2/2023  3:22 PM CDT -----  Type:  Sooner Appointment Request    Caller is requesting a sooner appointment.  Caller declined first available appointment listed below.  Caller will not accept being placed on the waitlist and is requesting a message be sent to doctor.    Name of Caller:  pt  When is the first available appointment?  N/A  Symptoms:  Really bad dizzy spells/water in ear/very painful  Best Call Back Number:  [phone]  Additional Information:  pt is calling to be seen ASAP. Please call back to advise. Thanks!

## 2023-08-02 NOTE — TELEPHONE ENCOUNTER
Left message on voicemail for patient to call back. Need to advise patient that at this time, our Medicaid Panels are full. Patient can call our Medicaid Escalation line at [phone] to assist in finding providers that have open panels. Patient can also check with her insurance to see which providers have open panels. Thanks, Milli

## 2023-08-03 ENCOUNTER — HOSPITAL ENCOUNTER (EMERGENCY)
Facility: HOSPITAL | Age: 43
Discharge: HOME | End: 2023-08-03
Attending: STUDENT IN AN ORGANIZED HEALTH CARE EDUCATION/TRAINING PROGRAM
Payer: MEDICAID

## 2023-08-03 VITALS
WEIGHT: 110 LBS | DIASTOLIC BLOOD PRESSURE: 72 MMHG | RESPIRATION RATE: 17 BRPM | SYSTOLIC BLOOD PRESSURE: 117 MMHG | BODY MASS INDEX: 18.78 KG/M2 | OXYGEN SATURATION: 99 % | TEMPERATURE: 98 F | HEIGHT: 64 IN | HEART RATE: 80 BPM

## 2023-08-03 DIAGNOSIS — H65.01 RIGHT ACUTE SEROUS OTITIS MEDIA, RECURRENCE NOT SPECIFIED: Primary | ICD-10-CM

## 2023-08-03 DIAGNOSIS — H61.21 IMPACTED CERUMEN OF RIGHT EAR: ICD-10-CM

## 2023-08-03 LAB
B-HCG UR QL: NEGATIVE
CTP QC/QA: YES

## 2023-08-03 PROCEDURE — 69209 REMOVE IMPACTED EAR WAX UNI: CPT | Mod: RT

## 2023-08-03 PROCEDURE — 63600175 PHARM REV CODE 636 W HCPCS: Performed by: NURSE PRACTITIONER

## 2023-08-03 PROCEDURE — 99283 EMERGENCY DEPT VISIT LOW MDM: CPT

## 2023-08-03 PROCEDURE — 81025 URINE PREGNANCY TEST: CPT | Performed by: NURSE PRACTITIONER

## 2023-08-03 PROCEDURE — 25000003 PHARM REV CODE 250: Performed by: NURSE PRACTITIONER

## 2023-08-03 PROCEDURE — 96372 THER/PROPH/DIAG INJ SC/IM: CPT | Mod: 59 | Performed by: NURSE PRACTITIONER

## 2023-08-03 RX ORDER — AMOXICILLIN AND CLAVULANATE POTASSIUM 875; 125 MG/1; MG/1
1 TABLET, FILM COATED ORAL 2 TIMES DAILY
Qty: 14 TABLET | Refills: 0 | Status: SHIPPED | OUTPATIENT
Start: 2023-08-03 | End: 2023-08-10

## 2023-08-03 RX ORDER — DOCUSATE SODIUM 50 MG/5ML
100 LIQUID ORAL DAILY
Status: DISCONTINUED | OUTPATIENT
Start: 2023-08-03 | End: 2023-08-03 | Stop reason: HOSPADM

## 2023-08-03 RX ORDER — ACETAMINOPHEN 500 MG
1000 TABLET ORAL
Status: DISCONTINUED | OUTPATIENT
Start: 2023-08-03 | End: 2023-08-03 | Stop reason: HOSPADM

## 2023-08-03 RX ORDER — KETOROLAC TROMETHAMINE 30 MG/ML
15 INJECTION, SOLUTION INTRAMUSCULAR; INTRAVENOUS
Status: COMPLETED | OUTPATIENT
Start: 2023-08-03 | End: 2023-08-03

## 2023-08-03 RX ADMIN — KETOROLAC TROMETHAMINE 15 MG: 30 INJECTION, SOLUTION INTRAMUSCULAR; INTRAVENOUS at 02:08

## 2023-08-03 RX ADMIN — DOCUSATE SODIUM LIQUID 100 MG: 100 LIQUID ORAL at 02:08

## 2023-08-03 NOTE — ED PROVIDER NOTES
Encounter Date: 8/3/2023       History     Chief Complaint   Patient presents with    Otalgia     Patient complains of right ear pain, and dizziness and states she cant even lay on that side      Patient is a 43 y.o. female who presents to the ED 08/03/2023 with a chief complaint of bilateral otalgia.  Patient states this has been present for 3 days.  She states she has chronic intermittent vertigo that is unchanged.  Patient states that she initially had some pain in her left ear after a dental procedure which she related to the dental procedure but over the last 3 days also noted pain with decreased hearing in the right ear and is concerned about an ear infection.  She denies any fever congestion or cough for other symptoms. She had a sore throat several days ago but she says it resolved. She is been referred to ENT in the past for her chronic dizziness however she does not have an appointment for 2 months and has not seen them.  Patient has a past medical history of MTHFR mutation, antithrombin 3 deficiency, long-term anticoagulation use, PE, seizures, pancreatitis, and anemia.  Past surgical history noted below.             Review of patient's allergies indicates:   Allergen Reactions    Folic acid     Iron analogues Anaphylaxis     Infusion only. Tolerates po    Nsaids (non-steroidal anti-inflammatory drug)      cannot take NSAIDs- upsets the stomach     Past Medical History:   Diagnosis Date    Anticoagulant long-term use     Antithrombin III deficiency 08/15/2017    Anxiety     B12 deficiency 08/15/2017    Bipolar disorder     Constipation     Eating disorder     Encounter for blood transfusion     Gastroparesis     MTHFR mutation 08/15/2017    Pancreatitis     PONV (postoperative nausea and vomiting)     Pulmonary embolism     Seizures     AS A CHILD    Vitamin D deficiency 08/15/2017     Past Surgical History:   Procedure Laterality Date    APPENDECTOMY      bowel obstruction      x 2 COLON RESECTIONS     LAPAROSCOPIC SALPINGO-OOPHORECTOMY Right 12/15/2021    Procedure: SALPINGO-OOPHORECTOMY, LYSIS OF ADHESIONS ;  Surgeon: Gigi Oquendo MD;  Location: [facility];  Service: OB/GYN;  Laterality: Right;    LAPAROSCOPY N/A 12/15/2021    Procedure: LAPAROSCOPY;  Surgeon: Gigi Oquendo MD;  Location: [facility];  Service: OB/GYN;  Laterality: N/A;    lysis of adhesions      LYSIS OF ADHESIONS N/A 9/28/2022    Procedure: LYSIS, ADHESIONS;  Surgeon: Peter Perez MD;  [facility];  Service: General;  Laterality: N/A;    OVARIAN CYST REMOVAL      REVISION OF SCAR N/A 9/28/2022    Procedure: LAPAROTOMY OF SCAR TISSUE REVISION;  Surgeon: Gigi Oquendo MD;  Location: [facility];  Service: OB/GYN;  Laterality: N/A;    uterine polyp removal       Family History   Problem Relation Age of Onset    Cancer Mother         breast cancer    Hyperlipidemia Father     Arthritis Father     Cancer Maternal Grandmother         breast and lung    Colon cancer Maternal Grandmother 62    Prostate cancer Maternal Grandfather     Prostate cancer Paternal Grandfather      Social History     Tobacco Use    Smoking status: Never    Smokeless tobacco: Never   Substance Use Topics    Alcohol use: No    Drug use: No     Review of Systems   Constitutional:  Negative for chills and fever.   HENT:  Positive for dental problem (resolved), ear pain and hearing loss. Negative for congestion, ear discharge, facial swelling, postnasal drip, rhinorrhea, sinus pressure, sinus pain, sneezing, sore throat, tinnitus, trouble swallowing and voice change.    Eyes:  Negative for visual disturbance.   Respiratory:  Negative for chest tightness and shortness of breath.    Cardiovascular:  Negative for chest pain.   Gastrointestinal:  Negative for abdominal pain, diarrhea, nausea and vomiting.   Genitourinary:  Negative for dysuria.   Musculoskeletal:  Negative for arthralgias and myalgias.   Skin:  Negative for rash and wound.   Neurological:  Positive for  dizziness (not current, chronic). Negative for seizures, syncope, speech difficulty, light-headedness and numbness.   Hematological:  Does not bruise/bleed easily.       Physical Exam     Initial Vitals   BP Pulse Resp Temp SpO2   08/03/23 1117 08/03/23 1117 08/03/23 1117 08/03/23 1118 08/03/23 1117   114/77 85 20 98.1 °F (36.7 °C) 100 %      MAP       --                Physical Exam    Nursing note and vitals reviewed.  Constitutional: Vital signs are normal. She appears well-developed and well-nourished.   HENT:   Head: Normocephalic and atraumatic.   Right Ear: External ear and ear canal normal. No lacerations. No drainage or swelling. No foreign bodies. No mastoid tenderness. Tympanic membrane is bulging. Tympanic membrane is not perforated, not erythematous and not retracted. A middle ear effusion is present. No hemotympanum.   Left Ear: Tympanic membrane, external ear and ear canal normal. No lacerations. No drainage or swelling. No foreign bodies. No mastoid tenderness. Tympanic membrane is not perforated, not erythematous, not retracted and not bulging.  No middle ear effusion. No hemotympanum.   No mastoid erythema, swelling, or tenderness.  No trismus.  Normal dentition.  Pupil changes.  Normal posterior oropharynx with uvula midline.  Initial cerumen impaction on the right side noted.   Eyes: Pupils are equal, round, and reactive to light.   Neck: Neck supple.   Cardiovascular:  Normal rate, regular rhythm, normal heart sounds and intact distal pulses.     Exam reveals no gallop and no friction rub.       No murmur heard.  Pulmonary/Chest: Breath sounds normal. She has no wheezes. She has no rhonchi. She has no rales.   Abdominal: Abdomen is soft. Bowel sounds are normal. There is no abdominal tenderness.   Musculoskeletal:      Cervical back: Neck supple.     Neurological: She is alert and oriented to person, place, and time. She has normal strength.   Skin: Skin is warm, dry and intact.   Psychiatric:  She has a normal mood and affect. Her speech is normal and behavior is normal.         ED Course   Procedures  Labs Reviewed   POCT URINE PREGNANCY          Imaging Results    None          Medications   acetaminophen tablet 1,000 mg (1,000 mg Oral Not Given 8/3/23 1345)   docusate 50 mg/5 mL liquid 100 mg (100 mg Otic Given 8/3/23 1438)   ketorolac injection 15 mg (15 mg Intramuscular Given 8/3/23 1416)     Medical Decision Making:   Differential Diagnosis:   AOM  AOE  Mastoiditis       APC / Resident Notes:   Patient is a 43 y.o. female who presents to the ED 08/03/2023 who underwent emergent evaluation for bi otalgia.  Patient impaction on the right which is removed with improved hearing however partial visual hallucination of right TM bulging with effusion.  Concerning for possible otitis media without evidence TM rupture.  No drainage in the ear canal.  Bilateral canals without swelling or erythema or drainage.  Pain with pinna movement bilaterally.  No evidence of mastoiditis.  No systemic signs of infection.  Vital signs normal.  She is given prescription for Augmentin and referred to ENT. Based on my clinical evaluation, I do not appreciate any immediate, emergent, or life threatening condition or etiology that warrants additional workup today and feel that the patient can be discharged with close follow up care. Follow up and return precautions discussed; patient verbalized understanding and is agreeable to plan of care. Patient discharged home in stable condition.                             Clinical Impression:   Final diagnoses:  [H65.01] Right acute serous otitis media, recurrence not specified (Primary)  [H61.21] Impacted cerumen of right ear        ED Disposition Condition    Discharge Stable          ED Prescriptions       Medication Sig Dispense Start Date End Date Auth. Provider    amoxicillin-clavulanate 875-125mg (AUGMENTIN) 875-125 mg per tablet Take 1 tablet by mouth 2 (two) times daily. for 7  days 14 tablet 8/3/2023 8/10/2023 Yady Rivas NP          Follow-up Information       Follow up With Specialties Details Why Contact Info Additional Information    Leonel Montenegro MD Otolaryngology In 2 days  [address] [facility] [address]  [phone]       [facility] - ED Emergency Medicine  As needed, If symptoms worsen [address] [place] [address] [address] [place] [address] 1st floor             Yady Rivas NP  08/03/23 8533

## 2023-08-03 NOTE — ED NOTES
Pt in possession of all belongings.  VSS; no signs of distress.  Pt to be escorted home by brother in personal auto.  Discharge instructions given to pt and explained by RN; pt verbalized understanding.  Pt agreed with care and discharge.

## 2023-08-03 NOTE — ED NOTES
Pt complains of B ear pain, but more on R side.  Hx of osteopenia.  Issues with L ear since dental procedure on 07/20/2023.  Dizziness for approximately 1 year.  Decreased hearing in R ear per pt.  Previous sore throat; pt concerned for possible drainage.  Headache to face and behind eyes.  No issues communicating during assessment.

## 2023-08-18 ENCOUNTER — TELEPHONE (OUTPATIENT)
Dept: OTOLARYNGOLOGY | Facility: CLINIC | Age: 43
End: 2023-08-18
Payer: MEDICAID

## 2023-08-18 DIAGNOSIS — Z78.0 ASYMPTOMATIC MENOPAUSAL STATE: Primary | ICD-10-CM

## 2023-08-18 NOTE — TELEPHONE ENCOUNTER
----- Message from Nina Gardner sent at 8/18/2023 12:08 PM CDT -----  Contact: patient  Type:  Sooner Apoointment Request    Caller is requesting a sooner appointment.  Caller declined first available appointment listed below.  Caller will not accept being placed on the waitlist and is requesting a message be sent to doctor.    Name of Caller:patient     When is the first available appointment?    Symptoms: er f/u     Would the patient rather a call back or a response via [facility]? Call     Best Call Back Number:[phone] (home)      Additional Information:

## 2023-08-18 NOTE — TELEPHONE ENCOUNTER
Contacted patient and informed her that Dr. Montenegro doesn't see patients with medicaid in [place] but he can see her in [place] but there is a 2-3 month wait. Patient states that she was seen in ER and the provider that saw her states that she was told that he would be able to see her sooner. I informed her that she can call her insurance company to find out who else she can be referred to and she was also given the medicaid escalation line for assistance.     Will forward to Dr. Montenegro's nurse to see if she can find a sooner appointment.

## 2023-08-24 ENCOUNTER — HOSPITAL ENCOUNTER (OUTPATIENT)
Dept: RADIOLOGY | Facility: HOSPITAL | Age: 43
Discharge: HOME | End: 2023-08-24
Attending: FAMILY MEDICINE
Payer: MEDICAID

## 2023-08-24 DIAGNOSIS — Z78.0 ASYMPTOMATIC MENOPAUSAL STATE: ICD-10-CM

## 2023-08-24 PROCEDURE — 77080 DXA BONE DENSITY AXIAL: CPT | Mod: TC,PO

## 2023-09-08 ENCOUNTER — PATIENT OUTREACH (OUTPATIENT)
Dept: EMERGENCY MEDICINE | Facility: HOSPITAL | Age: 43
End: 2023-09-08

## 2023-09-27 ENCOUNTER — HOSPITAL ENCOUNTER (EMERGENCY)
Facility: HOSPITAL | Age: 43
Discharge: HOME | End: 2023-09-27
Attending: EMERGENCY MEDICINE
Payer: MEDICAID

## 2023-09-27 VITALS
OXYGEN SATURATION: 100 % | TEMPERATURE: 98 F | WEIGHT: 110 LBS | RESPIRATION RATE: 16 BRPM | DIASTOLIC BLOOD PRESSURE: 67 MMHG | HEIGHT: 64 IN | HEART RATE: 75 BPM | SYSTOLIC BLOOD PRESSURE: 98 MMHG | BODY MASS INDEX: 18.78 KG/M2

## 2023-09-27 DIAGNOSIS — R07.89 CHEST DISCOMFORT: ICD-10-CM

## 2023-09-27 LAB
ANION GAP SERPL CALC-SCNC: 7 MMOL/L (ref 8–16)
BASOPHILS # BLD AUTO: 0.03 K/UL (ref 0–0.2)
BASOPHILS NFR BLD: 0.6 % (ref 0–1.9)
BUN SERPL-MCNC: 10 MG/DL (ref 6–20)
CALCIUM SERPL-MCNC: 8.4 MG/DL (ref 8.7–10.5)
CHLORIDE SERPL-SCNC: 112 MMOL/L (ref 95–110)
CO2 SERPL-SCNC: 21 MMOL/L (ref 23–29)
CREAT SERPL-MCNC: 0.9 MG/DL (ref 0.5–1.4)
D DIMER PPP IA.FEU-MCNC: <0.19 MG/L FEU
DIFFERENTIAL METHOD: ABNORMAL
EOSINOPHIL # BLD AUTO: 0 K/UL (ref 0–0.5)
EOSINOPHIL NFR BLD: 0.6 % (ref 0–8)
ERYTHROCYTE [DISTWIDTH] IN BLOOD BY AUTOMATED COUNT: 14.4 % (ref 11.5–14.5)
EST. GFR  (NO RACE VARIABLE): >60 ML/MIN/1.73 M^2
GLUCOSE SERPL-MCNC: 101 MG/DL (ref 70–110)
HCT VFR BLD AUTO: 36 % (ref 37–48.5)
HGB BLD-MCNC: 12.3 G/DL (ref 12–16)
IMM GRANULOCYTES # BLD AUTO: 0.01 K/UL (ref 0–0.04)
IMM GRANULOCYTES NFR BLD AUTO: 0.2 % (ref 0–0.5)
LYMPHOCYTES # BLD AUTO: 1.7 K/UL (ref 1–4.8)
LYMPHOCYTES NFR BLD: 36.3 % (ref 18–48)
MCH RBC QN AUTO: 29.9 PG (ref 27–31)
MCHC RBC AUTO-ENTMCNC: 34.2 G/DL (ref 32–36)
MCV RBC AUTO: 88 FL (ref 82–98)
MONOCYTES # BLD AUTO: 0.5 K/UL (ref 0.3–1)
MONOCYTES NFR BLD: 10.6 % (ref 4–15)
NEUTROPHILS # BLD AUTO: 2.4 K/UL (ref 1.8–7.7)
NEUTROPHILS NFR BLD: 51.7 % (ref 38–73)
NRBC BLD-RTO: 0 /100 WBC
PLATELET # BLD AUTO: 216 K/UL (ref 150–450)
PMV BLD AUTO: 9.2 FL (ref 9.2–12.9)
POTASSIUM SERPL-SCNC: 3.7 MMOL/L (ref 3.5–5.1)
RBC # BLD AUTO: 4.11 M/UL (ref 4–5.4)
SODIUM SERPL-SCNC: 140 MMOL/L (ref 136–145)
TROPONIN I SERPL DL<=0.01 NG/ML-MCNC: <0.006 NG/ML (ref 0–0.03)
WBC # BLD AUTO: 4.63 K/UL (ref 3.9–12.7)

## 2023-09-27 PROCEDURE — 84484 ASSAY OF TROPONIN QUANT: CPT | Performed by: EMERGENCY MEDICINE

## 2023-09-27 PROCEDURE — 99285 EMERGENCY DEPT VISIT HI MDM: CPT | Mod: 25

## 2023-09-27 PROCEDURE — 93010 ELECTROCARDIOGRAM REPORT: CPT | Mod: ,,, | Performed by: INTERNAL MEDICINE

## 2023-09-27 PROCEDURE — 94760 N-INVAS EAR/PLS OXIMETRY 1: CPT

## 2023-09-27 PROCEDURE — 36415 COLL VENOUS BLD VENIPUNCTURE: CPT | Performed by: EMERGENCY MEDICINE

## 2023-09-27 PROCEDURE — 93005 ELECTROCARDIOGRAM TRACING: CPT

## 2023-09-27 PROCEDURE — 85379 FIBRIN DEGRADATION QUANT: CPT | Performed by: EMERGENCY MEDICINE

## 2023-09-27 PROCEDURE — 80048 BASIC METABOLIC PNL TOTAL CA: CPT | Performed by: EMERGENCY MEDICINE

## 2023-09-27 PROCEDURE — 93010 EKG 12-LEAD: ICD-10-PCS | Mod: ,,, | Performed by: INTERNAL MEDICINE

## 2023-09-27 PROCEDURE — 85025 COMPLETE CBC W/AUTO DIFF WBC: CPT | Performed by: EMERGENCY MEDICINE

## 2023-09-27 NOTE — ED PROVIDER NOTES
Chief complaint:  Chest Pain (Sharp left sided. Started several hours ago. )      HPI:  Janeth Siddiqi is a 43 y.o. female  with hx PE on apixaban, gatroparesis presenting with chest pain.  Patient does report some episodes of chest pain with GI upset and vomiting she associates with her gastroparesis, typically on a postprandial basis.  Patient reports onset of pain between 3 and 4 hours prior to arrival after eating accompanied by 1 episode of nonbilious, nonbloody emesis.  Pain is in the left chest with radiation to the shoulder.  Radiation is less typical of her accustomed pain.  She denies pleuritic pain or dyspnea.  No hemoptysis.  No diaphoresis or exertional symptoms.  No relationship to position.    ROS: As per HPI and below:  No new abdominal pain, blood in the stools, dark stools, syncope, back pain, fever.    Review of patient's allergies indicates:   Allergen Reactions    Folic acid     Iron analogues Anaphylaxis     Infusion only. Tolerates po    Lidocaine Swelling    Nsaids (non-steroidal anti-inflammatory drug)      cannot take NSAIDs- upsets the stomach       Patient's Medications   New Prescriptions    No medications on file   Previous Medications    ACETAMINOPHEN (TYLENOL) 325 MG TABLET    Take 650 mg by mouth every 6 (six) hours as needed for Pain.    APIXABAN (ELIQUIS) 5 MG TAB    Take 1 tablet (5 mg total) by mouth 2 (two) times daily.    ASHWAGANDHA ROOT EXTRACT 300 MG CAP    Take 300 mg by mouth Daily.    ATORVASTATIN (LIPITOR) 40 MG TABLET    Take 1 tablet (40 mg total) by mouth once daily.    SPJBGGT-WENHMQL-OZVSGCP-TERBIN 4-2-1-4 % SOLN    Apply topically. Right toe nail    GABAPENTIN (NEURONTIN) 300 MG CAPSULE    Take 400 mg by mouth every evening.    HYDROCODONE-ACETAMINOPHEN (NORCO) 5-325 MG PER TABLET    Take 1 tablet by mouth every 12 (twelve) hours as needed.    HYDROCORTISONE 2.5 % CREAM    Apply topically 2 (two) times daily as needed.    HYDROXYZINE (ATARAX) 50 MG  TABLET    Take 50 mg by mouth nightly as needed.    IBUPROFEN (ADVIL,MOTRIN) 800 MG TABLET    Take 1 tablet (800 mg total) by mouth 3 (three) times daily.    LINZESS 290 MCG CAP    Take 290 mcg by mouth once daily.     NORETHINDRONE-E.ESTRADIOL-IRON (LO LOESTRIN FE ORAL)    Take by mouth every evening.    NORETHINDRONE-ETHINYL ESTRADIOL (JUNEL FE 1/20) 1 MG-20 MCG (21)/75 MG (7) PER TABLET    Take 1 tablet by mouth once daily.    ONDANSETRON (ZOFRAN-ODT) 8 MG TBDL    Take 1 tablet (8 mg total) by mouth every 8 (eight) hours as needed.    TOPIRAMATE (TOPAMAX) 200 MG TAB    Take 200 mg by mouth once daily.    TRAMADOL (ULTRAM) 50 MG TABLET    Take 50 mg by mouth every 6 (six) hours as needed.    TRAZODONE (DESYREL) 150 MG TABLET    Take 300 mg by mouth nightly.   Modified Medications    No medications on file   Discontinued Medications    No medications on file       PMH:  As per HPI and below:  Past Medical History:   Diagnosis Date    Anticoagulant long-term use     Antithrombin III deficiency 08/15/2017    Anxiety     B12 deficiency 08/15/2017    Bipolar disorder     Constipation     Eating disorder     Encounter for blood transfusion     Gastroparesis     MTHFR mutation 08/15/2017    Pancreatitis     PONV (postoperative nausea and vomiting)     Pulmonary embolism     Seizures     AS A CHILD    Vitamin D deficiency 08/15/2017     Past Surgical History:   Procedure Laterality Date    APPENDECTOMY      bowel obstruction      x 2 COLON RESECTIONS    LAPAROSCOPIC SALPINGO-OOPHORECTOMY Right 12/15/2021    Procedure: SALPINGO-OOPHORECTOMY, LYSIS OF ADHESIONS ;  Surgeon: Gigi Oquendo MD;  Location: [facility];  Service: OB/GYN;  Laterality: Right;    LAPAROSCOPY N/A 12/15/2021    Procedure: LAPAROSCOPY;  Surgeon: Gigi Oquendo MD;  Location: [facility];  Service: OB/GYN;  Laterality: N/A;    lysis of adhesions      LYSIS OF ADHESIONS N/A 9/28/2022    Procedure: LYSIS, ADHESIONS;  Surgeon: Peter Perez MD;   Location: [facility] OR;  Service: General;  Laterality: N/A;    OVARIAN CYST REMOVAL      REVISION OF SCAR N/A 9/28/2022    Procedure: LAPAROTOMY OF SCAR TISSUE REVISION;  Surgeon: Gigi Oquendo MD;  Location: [facility] OR;  Service: OB/GYN;  Laterality: N/A;    uterine polyp removal         Social History     Socioeconomic History    Marital status: Single   Tobacco Use    Smoking status: Never    Smokeless tobacco: Never   Substance and Sexual Activity    Alcohol use: No    Drug use: No     Social Determinants of Health     Financial Resource Strain: Low Risk  (6/30/2023)    Overall Financial Resource Strain (CARDIA)     Difficulty of Paying Living Expenses: Not hard at all   Food Insecurity: No Food Insecurity (6/30/2023)    Hunger Vital Sign     Worried About Running Out of Food in the Last Year: Never true     Ran Out of Food in the Last Year: Never true   Transportation Needs: No Transportation Needs (6/30/2023)    PRAPARE - Transportation     Lack of Transportation (Medical): No     Lack of Transportation (Non-Medical): No   Stress: Stress Concern Present (6/30/2023)    [place] [employer] - Occupational Stress Questionnaire     Feeling of Stress : To some extent   Social Connections: Unknown (6/30/2023)    Social Connection and Isolation Panel [NHANES]     Frequency of Communication with Friends and Family: Three times a week     Frequency of Social Gatherings with Friends and Family: Three times a week     Marital Status: Never    Housing Stability: Unknown (6/30/2023)    Housing Stability Vital Sign     Unable to Pay for Housing in the Last Year: No     Unstable Housing in the Last Year: No       Family History   Problem Relation Age of Onset    Cancer Mother         breast cancer    Hyperlipidemia Father     Arthritis Father     Cancer Maternal Grandmother         breast and lung    Colon cancer Maternal Grandmother 62    Prostate cancer Maternal Grandfather     Prostate cancer  Paternal Grandfather        Physical Exam:    Vitals:    09/27/23 1714   BP: 104/70   Pulse: 75   Resp: 18   Temp:      GENERAL:  No apparent distress.  Alert.    HEENT:  Moist mucous membranes.  Normocephalic and atraumatic.    NECK:  No swelling.  Midline trachea.   CARDIOVASCULAR:  Regular rate and rhythm.  2+ radial pulses.  No murmur or rub auscultated.  PULMONARY:  Lungs clear to auscultation bilaterally.  No wheezes, rales, or rhonci.  Unlabored respirations.  ABDOMEN:  Non-tender and non-distended.    EXTREMITIES:  Warm and well perfused.  Brisk capillary refill.  No peripheral edema.  Legs are symmetric and nontender to palpation.  NEUROLOGICAL:  Normal mental status.  Appropriate and conversant.  5/5 strength and sensation to light touch in the upper lower extremities.  SKIN:  No rashes or ecchymoses.    BACK:  Atraumatic.  No CVA tenderness to palpation.      Labs Reviewed   CBC W/ AUTO DIFFERENTIAL - Abnormal; Notable for the following components:       Result Value    Hematocrit 36.0 (*)     All other components within normal limits   BASIC METABOLIC PANEL - Abnormal; Notable for the following components:    Chloride 112 (*)     CO2 21 (*)     Calcium 8.4 (*)     Anion Gap 7 (*)     All other components within normal limits   D DIMER, QUANTITATIVE   TROPONIN I       Current Discharge Medication List        CONTINUE these medications which have NOT CHANGED    Details   acetaminophen (TYLENOL) 325 MG tablet Take 650 mg by mouth every 6 (six) hours as needed for Pain.      apixaban (ELIQUIS) 5 mg Tab Take 1 tablet (5 mg total) by mouth 2 (two) times daily.  Qty: 60 tablet, Refills: 3    Associated Diagnoses: Other pulmonary embolism without acute cor pulmonale, unspecified chronicity; B12 deficiency; MTHFR mutation      ashwagandha root extract 300 mg Cap Take 300 mg by mouth Daily.      atorvastatin (LIPITOR) 40 MG tablet Take 1 tablet (40 mg total) by mouth once daily.  Qty: 90 tablet, Refills: 3       dnnkzkh-mvfmqqq-sdkiffz-terbin 4-2-1-4 % Soln Apply topically. Right toe nail      gabapentin (NEURONTIN) 300 MG capsule Take 400 mg by mouth every evening.      HYDROcodone-acetaminophen (NORCO) 5-325 mg per tablet Take 1 tablet by mouth every 12 (twelve) hours as needed.      hydrocortisone 2.5 % cream Apply topically 2 (two) times daily as needed.      hydrOXYzine (ATARAX) 50 MG tablet Take 50 mg by mouth nightly as needed.      ibuprofen (ADVIL,MOTRIN) 800 MG tablet Take 1 tablet (800 mg total) by mouth 3 (three) times daily.  Qty: 40 tablet, Refills: 2      LINZESS 290 mcg Cap Take 290 mcg by mouth once daily.   Refills: 1      norethindrone-e.estradiol-iron (LO LOESTRIN FE ORAL) Take by mouth every evening.      norethindrone-ethinyl estradiol (JUNEL FE 1/20) 1 mg-20 mcg (21)/75 mg (7) per tablet Take 1 tablet by mouth once daily.      ondansetron (ZOFRAN-ODT) 8 MG TbDL Take 1 tablet (8 mg total) by mouth every 8 (eight) hours as needed.  Qty: 15 tablet, Refills: 0      topiramate (TOPAMAX) 200 MG Tab Take 200 mg by mouth once daily.      traMADoL (ULTRAM) 50 mg tablet Take 50 mg by mouth every 6 (six) hours as needed.      traZODone (DESYREL) 150 MG tablet Take 300 mg by mouth nightly.             Orders Placed This Encounter   Procedures    X-Ray Chest 1 View    Complete Blood Count (CBC)    Basic Metabolic Panel (BMP)    D dimer, quantitative    Troponin I    Cardiac Monitoring - Adult    Pulse Oximetry Continuous    EKG 12-lead       Imaging Results              X-Ray Chest 1 View (Final result)  Result time 09/27/23 16:57:54      Final result by Aleyda Ye MD (09/27/23 16:57:54)                   Impression:      No acute abnormality.      Electronically signed by: Aleyda Ye MD  Date:    09/27/2023  Time:    16:57               Narrative:    EXAMINATION:  XR CHEST 1 VIEW    CLINICAL HISTORY:  CP;    TECHNIQUE:  Single frontal view of the chest was  performed.    COMPARISON:  None    FINDINGS:  The lungs are clear, with normal appearance of pulmonary vasculature and no pleural effusion or pneumothorax.    The cardiac silhouette is normal in size. The hilar and mediastinal contours are unremarkable.    Bones are intact.                                  (radiology reading, visualized by me)      ED Course as of 09/27/23 1731   Wed Sep 27, 2023   1618 EKG:  Normal sinus rhythm at a rate of 65.  Normal intervals.  Normal axis.  No significant ST or T wave changes suggesting acute ischemia or infarction.  No MS depression or other signs of pericarditis.  (Independently interpreted by me)   [MR]   1730 Low risk HEART score on calculation. [MR]      ED Course User Index  [MR] Jayme Su MD       MDM:    43 y.o. female with chest pain in the setting known gastroparesis.  She does have a history of PE.  Symptoms are not typical for PE.  She notably has no tachycardia or hypoxia and is on apixaban for anticoagulation.  D-dimer sent for risk stratification with low suspicion.  I doubt aortic dissection.  I do not think further emergent CTA for aortic dissection is indicated.  Low suspicion for ACS with initial EKG without sign of acute findings including ischemia.  Cardiac biomarker sent for risk stratification for this pain going on between 3-4 hours.  She declines analgesia or antiemetic here.  She is resting comfortably on return of studies.  Troponin is negative at this point.  She will be discharged to follow up closely with Cardiology.  I do not think she requires serial troponin evaluation or admission for cardiac monitoring.  Return precautions reviewed.    Diagnoses:    1. Chest pain       Jayme Su MD  09/27/23 1731

## 2023-10-16 DIAGNOSIS — I26.99 OTHER PULMONARY EMBOLISM WITHOUT ACUTE COR PULMONALE, UNSPECIFIED CHRONICITY: ICD-10-CM

## 2023-10-16 DIAGNOSIS — E53.8 B12 DEFICIENCY: ICD-10-CM

## 2023-10-16 DIAGNOSIS — Z15.89 MTHFR MUTATION: ICD-10-CM

## 2023-10-16 RX ORDER — APIXABAN 5 MG/1
5 TABLET, FILM COATED ORAL 2 TIMES DAILY
Qty: 60 TABLET | Refills: 3 | Status: SHIPPED | OUTPATIENT
Start: 2023-10-16 | End: 2024-02-05

## 2024-01-17 DIAGNOSIS — R41.3 MEMORY CHANGES: Primary | ICD-10-CM

## 2024-01-18 ENCOUNTER — TELEPHONE (OUTPATIENT)
Dept: HEMATOLOGY/ONCOLOGY | Facility: CLINIC | Age: 44
End: 2024-01-18

## 2024-01-18 ENCOUNTER — HOSPITAL ENCOUNTER (EMERGENCY)
Facility: HOSPITAL | Age: 44
Discharge: HOME | End: 2024-01-18
Attending: EMERGENCY MEDICINE
Payer: MEDICAID

## 2024-01-18 VITALS
HEART RATE: 80 BPM | BODY MASS INDEX: 18.78 KG/M2 | HEIGHT: 64 IN | OXYGEN SATURATION: 99 % | DIASTOLIC BLOOD PRESSURE: 64 MMHG | RESPIRATION RATE: 14 BRPM | SYSTOLIC BLOOD PRESSURE: 108 MMHG | TEMPERATURE: 99 F | WEIGHT: 110 LBS

## 2024-01-18 DIAGNOSIS — R51.9 NONINTRACTABLE HEADACHE, UNSPECIFIED CHRONICITY PATTERN, UNSPECIFIED HEADACHE TYPE: Primary | ICD-10-CM

## 2024-01-18 DIAGNOSIS — Z15.89 MTHFR MUTATION: Primary | ICD-10-CM

## 2024-01-18 PROCEDURE — 96374 THER/PROPH/DIAG INJ IV PUSH: CPT

## 2024-01-18 PROCEDURE — 63600175 PHARM REV CODE 636 W HCPCS: Performed by: PHYSICIAN ASSISTANT

## 2024-01-18 PROCEDURE — 96375 TX/PRO/DX INJ NEW DRUG ADDON: CPT

## 2024-01-18 PROCEDURE — 25000003 PHARM REV CODE 250: Performed by: PHYSICIAN ASSISTANT

## 2024-01-18 PROCEDURE — 96361 HYDRATE IV INFUSION ADD-ON: CPT

## 2024-01-18 PROCEDURE — 99285 EMERGENCY DEPT VISIT HI MDM: CPT | Mod: 25

## 2024-01-18 RX ORDER — BUTALBITAL, ACETAMINOPHEN AND CAFFEINE 50; 325; 40 MG/1; MG/1; MG/1
1 TABLET ORAL
Status: COMPLETED | OUTPATIENT
Start: 2024-01-18 | End: 2024-01-18

## 2024-01-18 RX ORDER — ONDANSETRON HYDROCHLORIDE 2 MG/ML
4 INJECTION, SOLUTION INTRAVENOUS
Status: COMPLETED | OUTPATIENT
Start: 2024-01-18 | End: 2024-01-18

## 2024-01-18 RX ORDER — KETOROLAC TROMETHAMINE 30 MG/ML
15 INJECTION, SOLUTION INTRAMUSCULAR; INTRAVENOUS
Status: COMPLETED | OUTPATIENT
Start: 2024-01-18 | End: 2024-01-18

## 2024-01-18 RX ORDER — DIPHENHYDRAMINE HYDROCHLORIDE 50 MG/ML
12.5 INJECTION INTRAMUSCULAR; INTRAVENOUS
Status: COMPLETED | OUTPATIENT
Start: 2024-01-18 | End: 2024-01-18

## 2024-01-18 RX ORDER — FAMOTIDINE 10 MG/ML
20 INJECTION INTRAVENOUS
Status: COMPLETED | OUTPATIENT
Start: 2024-01-18 | End: 2024-01-18

## 2024-01-18 RX ADMIN — KETOROLAC TROMETHAMINE 15 MG: 30 INJECTION, SOLUTION INTRAMUSCULAR; INTRAVENOUS at 03:01

## 2024-01-18 RX ADMIN — SODIUM CHLORIDE 1000 ML: 9 INJECTION, SOLUTION INTRAVENOUS at 03:01

## 2024-01-18 RX ADMIN — BUTALBITAL, ACETAMINOPHEN, AND CAFFEINE 1 TABLET: 50; 325; 40 TABLET ORAL at 05:01

## 2024-01-18 RX ADMIN — ONDANSETRON 4 MG: 2 INJECTION INTRAMUSCULAR; INTRAVENOUS at 03:01

## 2024-01-18 RX ADMIN — DIPHENHYDRAMINE HYDROCHLORIDE 12.5 MG: 50 INJECTION INTRAMUSCULAR; INTRAVENOUS at 04:01

## 2024-01-18 RX ADMIN — FAMOTIDINE 20 MG: 10 INJECTION, SOLUTION INTRAVENOUS at 03:01

## 2024-01-18 NOTE — FIRST PROVIDER EVALUATION
Emergency Department TeleTriage Encounter Note      CHIEF COMPLAINT    Chief Complaint   Patient presents with    Headache    Nausea    Dizziness     Headache, dizziness, and blurred vison since this am, has taken her migraine medication with no relief        VITAL SIGNS   Initial Vitals [01/18/24 1241]   BP Pulse Resp Temp SpO2   117/81 103 19 98.7 °F (37.1 °C) 100 %      MAP       --            ALLERGIES    Review of patient's allergies indicates:   Allergen Reactions    Folic acid     Iron analogues Anaphylaxis     Infusion only. Tolerates po    Lidocaine Swelling    Nsaids (non-steroidal anti-inflammatory drug)      cannot take NSAIDs- upsets the stomach       PROVIDER TRIAGE NOTE  Patient presents with complaint of headache for 2 days.  Reports feels like a sinus headache.  Reports history of migraines.  States blurred vision and dizziness.      Phy:   Constitutional: well nourished, well developed, appearing stated age, NAD        Initial orders will be placed and care will be transferred to an alternate provider when patient is roomed for a full evaluation. Any additional orders and the final disposition will be determined by that provider.        ORDERS  Labs Reviewed - No data to display    ED Orders (720h ago, onward)      None              Virtual Visit Note: The provider triage portion of this emergency department evaluation and documentation was performed via [employer], a HIPAA-compliant telemedicine application, in concert with a tele-presenter in the room. A face to face patient evaluation with one of my colleagues will occur once the patient is placed in an emergency department room.      DISCLAIMER: This note was prepared with [employer] voice recognition transcription software. Garbled syntax, mangled pronouns, and other bizarre constructions may be attributed to that software system.

## 2024-01-18 NOTE — TELEPHONE ENCOUNTER
Notified by Dr Hurst that patient's had labs that were not drawn. Spoke with Gil at [facility] who reports that the Iron & TIBC and Ferritin can be added on but the CBC or CMP can no longer be added on. Patient made aware that CBC and CMP still needs to be drawn, patient verbalized understanding of above.

## 2024-01-18 NOTE — ED PROVIDER NOTES
Encounter Date: 1/18/2024       History     Chief Complaint   Patient presents with    Headache    Nausea    Dizziness     Headache, dizziness, and blurred vison since this am, has taken her migraine medication with no relief      Janeth Siddiqi is a 43 y.o. female presenting for evaluation of persistent headache since yesterday.  She states its the worse headache she has ever had. She has associated nausea and vomiting.  No neck pain or fever.  Some visual disturbance, but no loss of vision.  She has a history of previous migraines and has taken her regular home medication with no improvement. No numbness, tingling or weakness.  She has a past medical history of Anticoagulant long-term use, Antithrombin III deficiency (08/15/2017), Anxiety, B12 deficiency (08/15/2017), Bipolar disorder, Constipation, Eating disorder, Encounter for blood transfusion, Gastroparesis, MTHFR mutation (08/15/2017), Pancreatitis, PONV (postoperative nausea and vomiting), Pulmonary embolism, Seizures, and Vitamin D deficiency (08/15/2017).       The history is provided by the patient.     Review of patient's allergies indicates:   Allergen Reactions    Folic acid     Iron analogues Anaphylaxis     Infusion only. Tolerates po    Lidocaine Swelling    Nsaids (non-steroidal anti-inflammatory drug)      cannot take NSAIDs- upsets the stomach     Past Medical History:   Diagnosis Date    Anticoagulant long-term use     Antithrombin III deficiency 08/15/2017    Anxiety     B12 deficiency 08/15/2017    Bipolar disorder     Constipation     Eating disorder     Encounter for blood transfusion     Gastroparesis     MTHFR mutation 08/15/2017    Pancreatitis     PONV (postoperative nausea and vomiting)     Pulmonary embolism     Seizures     AS A CHILD    Vitamin D deficiency 08/15/2017     Past Surgical History:   Procedure Laterality Date    APPENDECTOMY      bowel obstruction      x 2 COLON RESECTIONS    LAPAROSCOPIC  SALPINGO-OOPHORECTOMY Right 12/15/2021    Procedure: SALPINGO-OOPHORECTOMY, LYSIS OF ADHESIONS ;  Surgeon: Gigi Oquendo MD;  Location: [facility];  Service: OB/GYN;  Laterality: Right;    LAPAROSCOPY N/A 12/15/2021    Procedure: LAPAROSCOPY;  Surgeon: Gigi Oquendo MD;  Location: [facility];  Service: OB/GYN;  Laterality: N/A;    lysis of adhesions      LYSIS OF ADHESIONS N/A 9/28/2022    Procedure: LYSIS, ADHESIONS;  Surgeon: Peter Perez MD;  Location: [facility];  Service: General;  Laterality: N/A;    OVARIAN CYST REMOVAL      REVISION OF SCAR N/A 9/28/2022    Procedure: LAPAROTOMY OF SCAR TISSUE REVISION;  Surgeon: Gigi Oquendo MD;  Location: [facility];  Service: OB/GYN;  Laterality: N/A;    uterine polyp removal       Family History   Problem Relation Age of Onset    Cancer Mother         breast cancer    Hyperlipidemia Father     Arthritis Father     Cancer Maternal Grandmother         breast and lung    Colon cancer Maternal Grandmother 62    Prostate cancer Maternal Grandfather     Prostate cancer Paternal Grandfather      Social History     Tobacco Use    Smoking status: Never    Smokeless tobacco: Never   Substance Use Topics    Alcohol use: No    Drug use: No     Review of Systems   Constitutional:  Negative for chills and fever.   HENT:  Negative for facial swelling and trouble swallowing.    Eyes:  Positive for visual disturbance. Negative for discharge.   Respiratory:  Negative for cough, chest tightness, shortness of breath and wheezing.    Cardiovascular:  Negative for chest pain and palpitations.   Gastrointestinal:  Positive for nausea and vomiting. Negative for abdominal pain and diarrhea.   Genitourinary:  Negative for dysuria and hematuria.   Musculoskeletal:  Negative for arthralgias, back pain, joint swelling, myalgias, neck pain and neck stiffness.   Skin:  Negative for color change, pallor, rash and wound.   Neurological:  Positive for headaches. Negative for dizziness, syncope,  weakness, light-headedness and numbness.   Hematological:  Does not bruise/bleed easily.   Psychiatric/Behavioral:  The patient is not nervous/anxious.        Physical Exam     Initial Vitals [01/18/24 1241]   BP Pulse Resp Temp SpO2   117/81 103 19 98.7 °F (37.1 °C) 100 %      MAP       --         Physical Exam    Nursing note and vitals reviewed.  Constitutional: She appears well-developed and well-nourished. She is not diaphoretic. No distress.   HENT:   Head: Normocephalic and atraumatic.   Right Ear: External ear normal.   Left Ear: External ear normal.   Nose: Nose normal.   Mouth/Throat: Oropharynx is clear and moist.   Eyes: Conjunctivae and EOM are normal. Pupils are equal, round, and reactive to light.   Neck: Neck supple.   Normal range of motion.  Cardiovascular:  Normal rate, regular rhythm, normal heart sounds and intact distal pulses.           Pulmonary/Chest: Breath sounds normal. No respiratory distress. She has no wheezes. She has no rhonchi. She has no rales.   Musculoskeletal:         General: No tenderness or edema. Normal range of motion.      Cervical back: Normal range of motion and neck supple.     Neurological: She is alert and oriented to person, place, and time. She has normal strength. No cranial nerve deficit or sensory deficit. GCS score is 15. GCS eye subscore is 4. GCS verbal subscore is 5. GCS motor subscore is 6.   No focal neurological deficits noted.  Cranial nerves III-XII grossly intact.  Equal strength and sensation noted to bilateral upper and lower extremities.     Skin: Skin is warm and dry. No rash and no abscess noted. No erythema.   Psychiatric: She has a normal mood and affect.         ED Course   Procedures  Labs Reviewed - No data to display         Imaging Results              CT Head Without Contrast (Final result)  Result time 01/18/24 17:08:43      Final result by Sirena Ceron MD (01/18/24 17:08:43)                   Impression:      No acute intracranial  findings      Electronically signed by: Sirena Ceron MD  Date:    01/18/2024  Time:    17:08               Narrative:    EXAMINATION:  CT HEAD WITHOUT CONTRAST    CLINICAL HISTORY:  Headache, sudden, severe;    TECHNIQUE:  Low dose axial images were obtained through the head.  Coronal and sagittal reformations were also performed. Contrast was not administered.    COMPARISON:  Including CTA head of 04/25/2023 including contrast and noncontrast images    FINDINGS:  Ventricles, sulci, fissure, white matter are unremarkable in appearance for the patient's age. There is no acute intracranial hemorrhage.  There is no intracranial mass effect.  There is no acute major vascular territory infarct. Note is made that MRI is typically more sensitive than CT particular for detection of early or small nonhemorrhagic infarct. The calvarium appears intact, mastoids well pneumatized, visualized paranasal sinuses essentially clear    The noncontrast images today are not significantly changed compared to the prior noncontrast images.  The small area of enhancement described in the sharee on the contrast portion of the exam as well as on prior MRI 06/05/2023 is not evident on the noncontrast CT.    .                                       Medications   sodium chloride 0.9% bolus 1,000 mL 1,000 mL (0 mLs Intravenous Stopped 1/18/24 1657)   ketorolac injection 15 mg (15 mg Intravenous Given 1/18/24 1557)   famotidine (PF) injection 20 mg (20 mg Intravenous Given 1/18/24 1557)   diphenhydrAMINE injection 12.5 mg (12.5 mg Intravenous Given 1/18/24 1601)   ondansetron injection 4 mg (4 mg Intravenous Given 1/18/24 1557)   butalbital-acetaminophen-caffeine -40 mg per tablet 1 tablet (1 tablet Oral Given 1/18/24 0450)     Medical Decision Making  Differential Diagnosis:   Migraine   Acute Intracranial Bleed   Tension headache       Pt emergently evaluated here in the ED.    Head CT negative for acute intracranial process.  She has  noticed symptomatic improvement with IV fluids and medications here in the ED.  We don't feel any further imaging or testing is indicated at this time.  She will be discharged home to follow-up with neurology for re-evaluation and further management.  Patient voices understanding and is agreeable to the plan.  Specific return precautions are given.       Amount and/or Complexity of Data Reviewed  Labs: ordered. Decision-making details documented in ED Course.  Radiology: ordered. Decision-making details documented in ED Course.    Risk  OTC drugs.  Prescription drug management.                                      Clinical Impression:  Final diagnoses:  [R51.9] Nonintractable headache, unspecified chronicity pattern, unspecified headache type (Primary)          ED Disposition Condition    Discharge Stable          ED Prescriptions    None       Follow-up Information       Follow up With Specialties Details Why Contact Info Additional Information    [facility] Emergency Medicine  As needed, If symptoms worsen [address] [place] [address] [address] [place] [address] 1st floor    Ck Savage MD Internal Medicine  As needed [address] [place] [address]  [place] [address]  [phone]                Janya Montenegro PA-STEVEN  01/18/24 [id number]

## 2024-01-24 ENCOUNTER — HOSPITAL ENCOUNTER (OUTPATIENT)
Dept: RADIOLOGY | Facility: HOSPITAL | Age: 44
Discharge: HOME | End: 2024-01-24
Attending: NURSE PRACTITIONER
Payer: MEDICAID

## 2024-01-24 DIAGNOSIS — R41.3 MEMORY CHANGES: ICD-10-CM

## 2024-01-24 PROCEDURE — 70551 MRI BRAIN STEM W/O DYE: CPT | Mod: TC,PO

## 2024-02-03 DIAGNOSIS — Z15.89 MTHFR MUTATION: ICD-10-CM

## 2024-02-03 DIAGNOSIS — I26.99 OTHER PULMONARY EMBOLISM WITHOUT ACUTE COR PULMONALE, UNSPECIFIED CHRONICITY: ICD-10-CM

## 2024-02-03 DIAGNOSIS — E53.8 B12 DEFICIENCY: ICD-10-CM

## 2024-02-05 RX ORDER — APIXABAN 5 MG/1
5 TABLET, FILM COATED ORAL 2 TIMES DAILY
Qty: 60 TABLET | Refills: 3 | Status: SHIPPED | OUTPATIENT
Start: 2024-02-05

## 2024-02-28 DIAGNOSIS — Z12.31 ENCOUNTER FOR SCREENING MAMMOGRAM FOR MALIGNANT NEOPLASM OF BREAST: Primary | ICD-10-CM

## 2024-04-18 ENCOUNTER — HOSPITAL ENCOUNTER (EMERGENCY)
Facility: HOSPITAL | Age: 44
Discharge: HOME | End: 2024-04-18
Attending: EMERGENCY MEDICINE
Payer: MEDICAID

## 2024-04-18 VITALS
TEMPERATURE: 98 F | BODY MASS INDEX: 19.63 KG/M2 | OXYGEN SATURATION: 99 % | RESPIRATION RATE: 18 BRPM | HEART RATE: 90 BPM | WEIGHT: 115 LBS | HEIGHT: 64 IN | SYSTOLIC BLOOD PRESSURE: 130 MMHG | DIASTOLIC BLOOD PRESSURE: 67 MMHG

## 2024-04-18 DIAGNOSIS — M79.89 LEG SWELLING: ICD-10-CM

## 2024-04-18 DIAGNOSIS — T14.8XXA MUSCLE STRAIN: Primary | ICD-10-CM

## 2024-04-18 PROCEDURE — 99284 EMERGENCY DEPT VISIT MOD MDM: CPT | Mod: 25

## 2024-04-18 NOTE — ED PROVIDER NOTES
Encounter Date: 4/18/2024       History     Chief Complaint   Patient presents with    Leg Pain     Right calf pain states she has a knot in the back and it hurts to extend the leg or flex      Chief complaint: Right calf pain    HPI:  44-year-old female presents with abrupt onset of right posterior leg pain.  She denies any known trauma.  She has no history of DVT or pulmonary emboli.  She denies any known history of osteoarthritis.      Review of patient's allergies indicates:   Allergen Reactions    Folic acid     Iron analogues Anaphylaxis     Infusion only. Tolerates po    Lidocaine Swelling    Nsaids (non-steroidal anti-inflammatory drug)      cannot take NSAIDs- upsets the stomach     Past Medical History:   Diagnosis Date    Anticoagulant long-term use     Antithrombin III deficiency 08/15/2017    Anxiety     B12 deficiency 08/15/2017    Bipolar disorder     Constipation     Eating disorder     Encounter for blood transfusion     Gastroparesis     MTHFR mutation 08/15/2017    Pancreatitis     PONV (postoperative nausea and vomiting)     Pulmonary embolism     Seizures     AS A CHILD    Vitamin D deficiency 08/15/2017     Past Surgical History:   Procedure Laterality Date    APPENDECTOMY      bowel obstruction      x 2 COLON RESECTIONS    LAPAROSCOPIC SALPINGO-OOPHORECTOMY Right 12/15/2021    Procedure: SALPINGO-OOPHORECTOMY, LYSIS OF ADHESIONS ;  Surgeon: Gigi Oquendo MD;  Location: [facility];  Service: OB/GYN;  Laterality: Right;    LAPAROSCOPY N/A 12/15/2021    Procedure: LAPAROSCOPY;  Surgeon: Gigi Oquendo MD;  Location: [facility];  Service: OB/GYN;  Laterality: N/A;    lysis of adhesions      LYSIS OF ADHESIONS N/A 9/28/2022    Procedure: LYSIS, ADHESIONS;  Surgeon: Peter Perez MD;  Location: [facility];  Service: General;  Laterality: N/A;    OVARIAN CYST REMOVAL      REVISION OF SCAR N/A 9/28/2022    Procedure: LAPAROTOMY OF SCAR TISSUE REVISION;  Surgeon: Gigi Oquendo MD;  Location: [facility];  Service: OB/GYN;  Laterality: N/A;    uterine polyp removal       Family History   Problem Relation Name Age of Onset    Cancer Mother          breast cancer    Hyperlipidemia Father      Arthritis Father      Cancer Maternal Grandmother          breast and lung    Colon cancer Maternal Grandmother  62    Prostate cancer Maternal Grandfather      Prostate cancer Paternal Grandfather       Social History     Tobacco Use    Smoking status: Never    Smokeless tobacco: Never   Substance Use Topics    Alcohol use: No    Drug use: No     Review of Systems   Constitutional:  Negative for fever.   Eyes:  Negative for visual disturbance.   Respiratory:  Negative for apnea and shortness of breath.    Cardiovascular:  Negative for chest pain and palpitations.   Gastrointestinal:  Negative for abdominal distention and abdominal pain.   Genitourinary:  Negative for difficulty urinating.   Musculoskeletal:  Positive for myalgias. Negative for neck pain.   Skin:  Negative for pallor and rash.   Neurological:  Negative for headaches.   Hematological:  Does not bruise/bleed easily.   Psychiatric/Behavioral:  Negative for agitation.        Physical Exam     Initial Vitals [04/18/24 1515]   BP Pulse Resp Temp SpO2   132/71 97 20 98.1 °F (36.7 °C) 98 %      MAP       --         Physical Exam    Nursing note and vitals reviewed.  Constitutional: She appears well-developed and well-nourished.   HENT:   Head: Normocephalic and atraumatic.   Eyes: Conjunctivae are normal.   Neck: Neck supple.   Normal range of motion.  Cardiovascular:  Normal rate, regular rhythm and normal heart sounds.     Exam reveals no gallop and no friction rub.       No murmur heard.  Pulmonary/Chest: Effort normal and breath sounds normal. No respiratory distress. She has no wheezes. She has no rhonchi. She has no rales.   Abdominal: Abdomen is soft. She exhibits no distension. There is no abdominal tenderness.   Musculoskeletal:         General: Tenderness  (right popliteal tenderness with mild proximal calf tenderness.  Small tender papule of the distal calf) present. Normal range of motion.      Cervical back: Normal range of motion and neck supple.     Neurological: She is alert and oriented to person, place, and time.   Skin: Skin is warm and dry. No erythema.   Psychiatric: She has a normal mood and affect.         ED Course   Procedures  Labs Reviewed - No data to display       Imaging Results              US Lower Extremity Veins Right (In process)  Result time 04/18/24 16:15:04                     Medications - No data to display  Medical Decision Making  44-year-old female presents with right calf pain.  She is tenderness over the popliteal fossa raising concerns for possible Baker cyst.  She has a small papule that is tender that does not appear infected.  Ultrasound fails to demonstrate any evidence of DVT.                                      Clinical Impression:  Final diagnoses:  [M79.89] Leg swelling  [T14.8XXA] Muscle strain (Primary)          ED Disposition Condition    Discharge Stable          ED Prescriptions    None       Follow-up Information    None          Ang Red III, MD  04/18/24 9515

## 2024-06-22 ENCOUNTER — HOSPITAL ENCOUNTER (EMERGENCY)
Facility: HOSPITAL | Age: 44
Discharge: HOME | End: 2024-06-22
Attending: STUDENT IN AN ORGANIZED HEALTH CARE EDUCATION/TRAINING PROGRAM
Payer: MEDICAID

## 2024-06-22 VITALS
RESPIRATION RATE: 16 BRPM | SYSTOLIC BLOOD PRESSURE: 110 MMHG | HEIGHT: 64 IN | WEIGHT: 115 LBS | BODY MASS INDEX: 19.63 KG/M2 | HEART RATE: 71 BPM | OXYGEN SATURATION: 100 % | DIASTOLIC BLOOD PRESSURE: 79 MMHG | TEMPERATURE: 98 F

## 2024-06-22 DIAGNOSIS — S16.1XXA CERVICAL STRAIN, ACUTE, INITIAL ENCOUNTER: ICD-10-CM

## 2024-06-22 DIAGNOSIS — M54.2 NECK PAIN: ICD-10-CM
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
## 2024-06-22 LAB
B-HCG UR QL: NEGATIVE
CTP QC/QA: YES

## 2024-06-22 PROCEDURE — 63600175 PHARM REV CODE 636 W HCPCS: Performed by: PHYSICIAN ASSISTANT

## 2024-06-22 PROCEDURE — 96372 THER/PROPH/DIAG INJ SC/IM: CPT | Performed by: PHYSICIAN ASSISTANT

## 2024-06-22 PROCEDURE — 81025 URINE PREGNANCY TEST: CPT | Performed by: PHYSICIAN ASSISTANT

## 2024-06-22 PROCEDURE — 25000003 PHARM REV CODE 250: Performed by: PHYSICIAN ASSISTANT

## 2024-06-22 PROCEDURE — 99284 EMERGENCY DEPT VISIT MOD MDM: CPT | Mod: 25

## 2024-06-22 RX ORDER — LIDOCAINE 50 MG/G
1 PATCH TOPICAL ONCE
Status: DISCONTINUED | OUTPATIENT
Start: 2024-06-22 | End: 2024-06-23 | Stop reason: HOSPADM

## 2024-06-22 RX ORDER — DICLOFENAC SODIUM 50 MG/1
50 TABLET, DELAYED RELEASE ORAL 2 TIMES DAILY PRN
Qty: 20 TABLET | Refills: 0 | Status: SHIPPED | OUTPATIENT
Start: 2024-06-22

## 2024-06-22 RX ORDER — LIDOCAINE 50 MG/G
1 PATCH TOPICAL DAILY
Qty: 30 PATCH | Refills: 0 | Status: SHIPPED | OUTPATIENT
Start: 2024-06-22

## 2024-06-22 RX ORDER — KETOROLAC TROMETHAMINE 30 MG/ML
30 INJECTION, SOLUTION INTRAMUSCULAR; INTRAVENOUS
Status: COMPLETED | OUTPATIENT
Start: 2024-06-22 | End: 2024-06-22

## 2024-06-22 RX ADMIN — KETOROLAC TROMETHAMINE 30 MG: 30 INJECTION, SOLUTION INTRAMUSCULAR at 06:06

## 2024-06-22 RX ADMIN — LIDOCAINE 1 PATCH: 50 PATCH TOPICAL at 06:06
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
## 2024-07-11 ENCOUNTER — HOSPITAL ENCOUNTER (EMERGENCY)
Facility: HOSPITAL | Age: 44
Discharge: HOME | End: 2024-07-11
Attending: EMERGENCY MEDICINE
Payer: MEDICAID

## 2024-07-11 VITALS
DIASTOLIC BLOOD PRESSURE: 95 MMHG | TEMPERATURE: 99 F | BODY MASS INDEX: 19.12 KG/M2 | SYSTOLIC BLOOD PRESSURE: 147 MMHG | HEART RATE: 82 BPM | WEIGHT: 112 LBS | RESPIRATION RATE: 18 BRPM | HEIGHT: 64 IN | OXYGEN SATURATION: 99 %

## 2024-07-11 DIAGNOSIS — K52.9 GASTROENTERITIS: ICD-10-CM

## 2024-07-11 DIAGNOSIS — K42.9 UMBILICAL HERNIA WITHOUT OBSTRUCTION AND WITHOUT GANGRENE: Primary | ICD-10-CM

## 2024-07-11 LAB
ALBUMIN SERPL BCP-MCNC: 3.8 G/DL (ref 3.5–5.2)
ALP SERPL-CCNC: 56 U/L (ref 55–135)
ALT SERPL W/O P-5'-P-CCNC: 7 U/L (ref 10–44)
ANION GAP SERPL CALC-SCNC: 8 MMOL/L (ref 8–16)
AST SERPL-CCNC: 15 U/L (ref 10–40)
B-HCG UR QL: NEGATIVE
BASOPHILS # BLD AUTO: 0.04 K/UL (ref 0–0.2)
BASOPHILS NFR BLD: 0.8 % (ref 0–1.9)
BILIRUB SERPL-MCNC: 0.3 MG/DL (ref 0.1–1)
BILIRUB UR QL STRIP: NEGATIVE
BUN SERPL-MCNC: 14 MG/DL (ref 6–20)
CALCIUM SERPL-MCNC: 9.5 MG/DL (ref 8.7–10.5)
CHLORIDE SERPL-SCNC: 108 MMOL/L (ref 95–110)
CLARITY UR: CLEAR
CO2 SERPL-SCNC: 20 MMOL/L (ref 23–29)
COLOR UR: YELLOW
CREAT SERPL-MCNC: 1.1 MG/DL (ref 0.5–1.4)
CTP QC/QA: YES
DIFFERENTIAL METHOD BLD: ABNORMAL
EOSINOPHIL # BLD AUTO: 0 K/UL (ref 0–0.5)
EOSINOPHIL NFR BLD: 0.6 % (ref 0–8)
ERYTHROCYTE [DISTWIDTH] IN BLOOD BY AUTOMATED COUNT: 14.2 % (ref 11.5–14.5)
EST. GFR  (NO RACE VARIABLE): >60 ML/MIN/1.73 M^2
GLUCOSE SERPL-MCNC: 84 MG/DL (ref 70–110)
GLUCOSE UR QL STRIP: NEGATIVE
HCT VFR BLD AUTO: 44.8 % (ref 37–48.5)
HGB BLD-MCNC: 15.2 G/DL (ref 12–16)
HGB UR QL STRIP: NEGATIVE
IMM GRANULOCYTES # BLD AUTO: 0.01 K/UL (ref 0–0.04)
IMM GRANULOCYTES NFR BLD AUTO: 0.2 % (ref 0–0.5)
KETONES UR QL STRIP: ABNORMAL
LACTATE SERPL-SCNC: 0.9 MMOL/L (ref 0.5–2.2)
LEUKOCYTE ESTERASE UR QL STRIP: NEGATIVE
LIPASE SERPL-CCNC: 81 U/L (ref 4–60)
LYMPHOCYTES # BLD AUTO: 1.9 K/UL (ref 1–4.8)
LYMPHOCYTES NFR BLD: 36.5 % (ref 18–48)
MCH RBC QN AUTO: 30.3 PG (ref 27–31)
MCHC RBC AUTO-ENTMCNC: 33.9 G/DL (ref 32–36)
MCV RBC AUTO: 89 FL (ref 82–98)
MONOCYTES # BLD AUTO: 0.6 K/UL (ref 0.3–1)
MONOCYTES NFR BLD: 10.5 % (ref 4–15)
NEUTROPHILS # BLD AUTO: 2.7 K/UL (ref 1.8–7.7)
NEUTROPHILS NFR BLD: 51.4 % (ref 38–73)
NITRITE UR QL STRIP: NEGATIVE
NRBC BLD-RTO: 0 /100 WBC
PH UR STRIP: 6 [PH] (ref 5–8)
PLATELET # BLD AUTO: 268 K/UL (ref 150–450)
PMV BLD AUTO: 8.8 FL (ref 9.2–12.9)
POTASSIUM SERPL-SCNC: 3.9 MMOL/L (ref 3.5–5.1)
PROT SERPL-MCNC: 7.6 G/DL (ref 6–8.4)
PROT UR QL STRIP: ABNORMAL
RBC # BLD AUTO: 5.02 M/UL (ref 4–5.4)
SODIUM SERPL-SCNC: 136 MMOL/L (ref 136–145)
SP GR UR STRIP: >1.03 (ref 1–1.03)
URN SPEC COLLECT METH UR: ABNORMAL
UROBILINOGEN UR STRIP-ACNC: NEGATIVE EU/DL
WBC # BLD AUTO: 5.32 K/UL (ref 3.9–12.7)

## 2024-07-11 PROCEDURE — 25500020 PHARM REV CODE 255: Performed by: EMERGENCY MEDICINE

## 2024-07-11 PROCEDURE — 36415 COLL VENOUS BLD VENIPUNCTURE: CPT | Performed by: NURSE PRACTITIONER

## 2024-07-11 PROCEDURE — 85025 COMPLETE CBC W/AUTO DIFF WBC: CPT | Performed by: NURSE PRACTITIONER

## 2024-07-11 PROCEDURE — 83690 ASSAY OF LIPASE: CPT | Performed by: NURSE PRACTITIONER

## 2024-07-11 PROCEDURE — 96361 HYDRATE IV INFUSION ADD-ON: CPT

## 2024-07-11 PROCEDURE — 83605 ASSAY OF LACTIC ACID: CPT | Performed by: NURSE PRACTITIONER

## 2024-07-11 PROCEDURE — 80053 COMPREHEN METABOLIC PANEL: CPT | Performed by: NURSE PRACTITIONER

## 2024-07-11 PROCEDURE — 81003 URINALYSIS AUTO W/O SCOPE: CPT | Performed by: NURSE PRACTITIONER

## 2024-07-11 PROCEDURE — 96375 TX/PRO/DX INJ NEW DRUG ADDON: CPT

## 2024-07-11 PROCEDURE — 81025 URINE PREGNANCY TEST: CPT | Performed by: NURSE PRACTITIONER

## 2024-07-11 PROCEDURE — 99285 EMERGENCY DEPT VISIT HI MDM: CPT | Mod: 25

## 2024-07-11 PROCEDURE — 96374 THER/PROPH/DIAG INJ IV PUSH: CPT | Mod: 59

## 2024-07-11 PROCEDURE — 63600175 PHARM REV CODE 636 W HCPCS: Performed by: NURSE PRACTITIONER

## 2024-07-11 PROCEDURE — 25000003 PHARM REV CODE 250: Performed by: NURSE PRACTITIONER

## 2024-07-11 RX ORDER — SPIRONOLACTONE 25 MG/1
25 TABLET ORAL 2 TIMES DAILY
COMMUNITY

## 2024-07-11 RX ORDER — ONDANSETRON 4 MG/1
4 TABLET, ORALLY DISINTEGRATING ORAL EVERY 8 HOURS PRN
Qty: 20 TABLET | Refills: 0 | Status: SHIPPED | OUTPATIENT
Start: 2024-07-11 | End: 2024-07-16

## 2024-07-11 RX ORDER — ONDANSETRON HYDROCHLORIDE 2 MG/ML
4 INJECTION, SOLUTION INTRAVENOUS
Status: COMPLETED | OUTPATIENT
Start: 2024-07-11 | End: 2024-07-11

## 2024-07-11 RX ORDER — MORPHINE SULFATE 4 MG/ML
4 INJECTION, SOLUTION INTRAMUSCULAR; INTRAVENOUS
Status: COMPLETED | OUTPATIENT
Start: 2024-07-11 | End: 2024-07-11

## 2024-07-11 RX ADMIN — MORPHINE SULFATE 4 MG: 4 INJECTION, SOLUTION INTRAMUSCULAR; INTRAVENOUS at 07:07

## 2024-07-11 RX ADMIN — ONDANSETRON 4 MG: 2 INJECTION INTRAMUSCULAR; INTRAVENOUS at 07:07

## 2024-07-11 RX ADMIN — IOHEXOL 75 ML: 350 INJECTION, SOLUTION INTRAVENOUS at 07:07

## 2024-07-11 RX ADMIN — SODIUM CHLORIDE 1000 ML: 9 INJECTION, SOLUTION INTRAVENOUS at 07:07

## 2024-07-11 NOTE — ED PROVIDER NOTES
Encounter Date: 7/11/2024       History     Chief Complaint   Patient presents with    Hernia     Pain to hernia in stomach. States when she eats it pokes out more and is more painful. Pt states the hernia seems to be bigger. She talked to her PCP and they told her to come here to get checked out. 4/10     Patient is a 44 y.o. female who presents to the ED 07/11/2024 with a chief complaint of Two days of mid abdominal pain near her hernia site.  She has a chronic periumbilical hernia.  She states over the last 2 days it has been poking out more worse when she eats and more painful with intermittent sharp pains in the area.  Has had multiple previous abdominal surgeries and bowel obstructions with history of pancreatitis.  She has also had her right ovary removed.  She has a history of antithrombin 3 deficiency with pulmonary embolism.  She has a history of appendectomy. She has chronic gastroparesis. Denies ETOH or drug use.              Review of patient's allergies indicates:   Allergen Reactions    Folic acid     Iron analogues Anaphylaxis     Infusion only. Tolerates po    Lidocaine Swelling    Nsaids (non-steroidal anti-inflammatory drug)      cannot take NSAIDs- upsets the stomach     Past Medical History:   Diagnosis Date    Anticoagulant long-term use     Antithrombin III deficiency 08/15/2017    Anxiety     B12 deficiency 08/15/2017    Bipolar disorder     Constipation     Eating disorder     Encounter for blood transfusion     Gastroparesis     MTHFR mutation 08/15/2017    Pancreatitis     PONV (postoperative nausea and vomiting)     Pulmonary embolism     Seizures     AS A CHILD    Vitamin D deficiency 08/15/2017     Past Surgical History:   Procedure Laterality Date    APPENDECTOMY      bowel obstruction      x 2 COLON RESECTIONS    LAPAROSCOPIC SALPINGO-OOPHORECTOMY Right 12/15/2021    Procedure: SALPINGO-OOPHORECTOMY, LYSIS OF ADHESIONS ;  Surgeon: Gigi Oquendo MD;  Location: [facility];  Service:  OB/GYN;  Laterality: Right;    LAPAROSCOPY N/A 12/15/2021    Procedure: LAPAROSCOPY;  Surgeon: Gigi Oquendo MD;  Location: [facility];  Service: OB/GYN;  Laterality: N/A;    lysis of adhesions      LYSIS OF ADHESIONS N/A 9/28/2022    Procedure: LYSIS, ADHESIONS;  Surgeon: Peter Perez MD;  Location: [facility];  Service: General;  Laterality: N/A;    OVARIAN CYST REMOVAL      REVISION OF SCAR N/A 9/28/2022    Procedure: LAPAROTOMY OF SCAR TISSUE REVISION;  Surgeon: Gigi Oquendo MD;  Location: [facility] OR;  Service: OB/GYN;  Laterality: N/A;    uterine polyp removal       Family History   Problem Relation Name Age of Onset    Cancer Mother          breast cancer    Hyperlipidemia Father      Arthritis Father      Cancer Maternal Grandmother          breast and lung    Colon cancer Maternal Grandmother  62    Prostate cancer Maternal Grandfather      Prostate cancer Paternal Grandfather       Social History     Tobacco Use    Smoking status: Never    Smokeless tobacco: Never   Substance Use Topics    Alcohol use: No    Drug use: No     Review of Systems   Constitutional:  Negative for chills and fever.   HENT:  Negative for sore throat.    Respiratory:  Negative for chest tightness and shortness of breath.    Cardiovascular:  Negative for chest pain.   Gastrointestinal:  Positive for abdominal pain and nausea. Negative for constipation, diarrhea and vomiting.   Genitourinary:  Negative for dysuria.   Musculoskeletal:  Negative for arthralgias and myalgias.   Skin:  Negative for rash and wound.   Neurological:  Negative for syncope.   Hematological:  Does not bruise/bleed easily.       Physical Exam     Initial Vitals [07/11/24 1555]   BP Pulse Resp Temp SpO2   118/76 82 18 98.9 °F (37.2 °C) 99 %      MAP       --         Physical Exam    Nursing note and vitals reviewed.  Constitutional: Vital signs are normal. She appears well-developed and well-nourished.   HENT:   Head: Normocephalic and atraumatic.   Eyes:  Pupils are equal, round, and reactive to light.   Neck: Neck supple.   Cardiovascular:  Normal rate, regular rhythm, normal heart sounds and intact distal pulses.     Exam reveals no gallop and no friction rub.       No murmur heard.  Pulmonary/Chest: Breath sounds normal. She has no wheezes. She has no rhonchi. She has no rales.   Abdominal: Abdomen is soft. Bowel sounds are normal. She exhibits no distension and no mass. There is abdominal tenderness in the periumbilical area.   Soft non tender periumbilical non reducible hernia  There is no rebound and no guarding.   Musculoskeletal:      Cervical back: Neck supple.     Neurological: She is alert and oriented to person, place, and time. She has normal strength.   Skin: Skin is warm, dry and intact.   Psychiatric: She has a normal mood and affect. Her speech is normal and behavior is normal.         ED Course   Procedures  Labs Reviewed   CBC W/ AUTO DIFFERENTIAL - Abnormal; Notable for the following components:       Result Value    MPV 8.8 (*)     All other components within normal limits    Narrative:     Collection has been rescheduled by [facility] at 07/11/2024 18:52 Reason:   Collected   COMPREHENSIVE METABOLIC PANEL - Abnormal; Notable for the following components:    CO2 20 (*)     ALT 7 (*)     All other components within normal limits    Narrative:     Collection has been rescheduled by [email or web address] at 07/11/2024 18:52 Reason:   Collected   LIPASE - Abnormal; Notable for the following components:    Lipase 81 (*)     All other components within normal limits    Narrative:     Collection has been rescheduled by [facility] at 07/11/2024 18:52 Reason:   Collected   URINALYSIS, REFLEX TO URINE CULTURE - Abnormal; Notable for the following components:    Specific Gravity, UA >1.030 (*)     Protein, UA Trace (*)     Ketones, UA 1+ (*)     All other components within normal limits    Narrative:     Specimen Source->Urine   LACTIC ACID, PLASMA    Narrative:     Collection has been  rescheduled by [facility] at 07/11/2024 18:52 Reason:   Collected   POCT URINE PREGNANCY          Imaging Results              CT Abdomen Pelvis With IV Contrast NO Oral Contrast (In process)  Result time 07/11/24 19:35:17                     Medications   sodium chloride 0.9% bolus 1,000 mL 1,000 mL (0 mLs Intravenous Stopped 7/11/24 2008)   ondansetron injection 4 mg (4 mg Intravenous Given 7/11/24 1904)   morphine injection 4 mg (4 mg Intravenous Given 7/11/24 1906)   iohexoL (OMNIPAQUE 350) injection 75 mL (75 mLs Intravenous Given 7/11/24 1945)     Medical Decision Making  Amount and/or Complexity of Data Reviewed  Labs: ordered.  Radiology: ordered.    Risk  Prescription drug management.         APC / Resident Notes:   Patient is a 44 y.o. female who presents to the ED 07/11/2024 who underwent emergent evaluation for abdominal pain.  Patient not pregnant.  Lipase only mildly elevated not consistent with pancreatitis.  Lactic acid level normal.  No leukocytosis.  Vital signs stable.  Patient is not appear to be septic.  No evidence of UTI.  Unremarkable.  Mildly elevated lipase not consistent with acute pancreatitis.  CT also not consistent with acute pancreatitis and I do not think acute pancreatitis.  She has a soft non reducible periumbilical hernia that is chronic for many years.  No evidence of incarceration or obstruction on CT today.  No other acute findings on CT today.  All findings discussed with patient in detail.  Discussed possible gastroenteritis findings.  It is of any acute bacterial infection requiring antibiotics.  Discussed possible viral etiology.  Patient has for new referral to Gastroenterology which she is given.  Based on my clinical evaluation, I do not appreciate any immediate, emergent, or life threatening condition or etiology that warrants additional workup today and feel that the patient can be discharged with close follow up care. Case discussed with Dr. Chamorro who is agreeable to plan  of care. Follow up and return precautions discussed; patient verbalized understanding and is agreeable to plan of care. Patient discharged home in stable condition.              ED Course as of 07/12/24 0953   Thu Jul 11, 2024 2105 CT without evidence of any emergent process per [facility].  No bowel obstruction.  No acute pancreatitis.  IMPRESSION: 1. Liquefied stool and air-fluid levels in the colon, suggestive of diarrheal illness, such as gastroenteritis. 2. Additional findings, as above.    [JK]      ED Course User Index  [JK] Yady Rivas NP               Medical Decision Making:   Differential Diagnosis:   SBO  Incarcerated hernia  Pancreatitis                Clinical Impression:  Final diagnoses:  [K42.9] Umbilical hernia without obstruction and without gangrene (Primary)  [K52.9] Gastroenteritis          ED Disposition Condition    Discharge Stable          ED Prescriptions       Medication Sig Dispense Start Date End Date Auth. Provider    ondansetron (ZOFRAN-ODT) 4 MG TbDL Take 1 tablet (4 mg total) by mouth every 8 (eight) hours as needed. 20 tablet 7/11/2024 7/16/2024 Yady Rivas NP          Follow-up Information       Follow up With Specialties Details Why Contact Info Additional Information    Neftaly Malloy Jr., MD General Surgery In 1 week  [address]  [address]  [facility] [address]  [phone]       Marcelle Galeana MD Gastroenterology, Internal Medicine In 1 week  [address]  [address]  [place] [address]  [phone]       [facility] -  Emergency Medicine  As needed, If symptoms worsen [address] [place] [address] [address] [place] [id number] 1st floor             Yady Rivas NP  07/12/24 0974

## 2024-07-24 DIAGNOSIS — E53.8 B12 DEFICIENCY: ICD-10-CM

## 2024-07-24 DIAGNOSIS — Z15.89 MTHFR MUTATION: ICD-10-CM

## 2024-07-24 DIAGNOSIS — I26.99 OTHER PULMONARY EMBOLISM WITHOUT ACUTE COR PULMONALE, UNSPECIFIED CHRONICITY: ICD-10-CM

## 2024-07-24 RX ORDER — APIXABAN 5 MG/1
5 TABLET, FILM COATED ORAL 2 TIMES DAILY
Qty: 60 TABLET | Refills: 3 | Status: SHIPPED | OUTPATIENT
Start: 2024-07-24

## 2024-07-29 DIAGNOSIS — R93.89 ABNORMAL FINDINGS ON DIAGNOSTIC IMAGING OF OTHER SPECIFIED BODY STRUCTURES: Primary | ICD-10-CM

## 2024-08-04 ENCOUNTER — HOSPITAL ENCOUNTER (EMERGENCY)
Facility: HOSPITAL | Age: 44
Discharge: HOME | End: 2024-08-04
Attending: EMERGENCY MEDICINE
Payer: MEDICAID

## 2024-08-04 VITALS
WEIGHT: 112 LBS | OXYGEN SATURATION: 100 % | DIASTOLIC BLOOD PRESSURE: 74 MMHG | SYSTOLIC BLOOD PRESSURE: 107 MMHG | HEIGHT: 64 IN | TEMPERATURE: 98 F | BODY MASS INDEX: 19.12 KG/M2 | RESPIRATION RATE: 18 BRPM | HEART RATE: 59 BPM

## 2024-08-04 DIAGNOSIS — K85.90 ACUTE PANCREATITIS, UNSPECIFIED COMPLICATION STATUS, UNSPECIFIED PANCREATITIS TYPE: Primary | ICD-10-CM

## 2024-08-04 DIAGNOSIS — K31.84 GASTROPARESIS: ICD-10-CM

## 2024-08-04 LAB
ALBUMIN SERPL BCP-MCNC: 3.8 G/DL (ref 3.5–5.2)
ALP SERPL-CCNC: 46 U/L (ref 55–135)
ALT SERPL W/O P-5'-P-CCNC: 9 U/L (ref 10–44)
ANION GAP SERPL CALC-SCNC: 10 MMOL/L (ref 8–16)
AST SERPL-CCNC: 18 U/L (ref 10–40)
B-HCG UR QL: NEGATIVE
BASOPHILS # BLD AUTO: 0.03 K/UL (ref 0–0.2)
BASOPHILS NFR BLD: 0.6 % (ref 0–1.9)
BILIRUB SERPL-MCNC: 0.3 MG/DL (ref 0.1–1)
BILIRUB UR QL STRIP: NEGATIVE
BUN SERPL-MCNC: 13 MG/DL (ref 6–20)
CALCIUM SERPL-MCNC: 9.1 MG/DL (ref 8.7–10.5)
CHLORIDE SERPL-SCNC: 107 MMOL/L (ref 95–110)
CLARITY UR: CLEAR
CO2 SERPL-SCNC: 18 MMOL/L (ref 23–29)
COLOR UR: YELLOW
CREAT SERPL-MCNC: 0.9 MG/DL (ref 0.5–1.4)
CTP QC/QA: YES
DIFFERENTIAL METHOD BLD: ABNORMAL
EOSINOPHIL # BLD AUTO: 0 K/UL (ref 0–0.5)
EOSINOPHIL NFR BLD: 0.4 % (ref 0–8)
ERYTHROCYTE [DISTWIDTH] IN BLOOD BY AUTOMATED COUNT: 14.1 % (ref 11.5–14.5)
EST. GFR  (NO RACE VARIABLE): >60 ML/MIN/1.73 M^2
GLUCOSE SERPL-MCNC: 87 MG/DL (ref 70–110)
GLUCOSE UR QL STRIP: NEGATIVE
HCT VFR BLD AUTO: 42.3 % (ref 37–48.5)
HGB BLD-MCNC: 14.1 G/DL (ref 12–16)
HGB UR QL STRIP: NEGATIVE
IMM GRANULOCYTES # BLD AUTO: 0.02 K/UL (ref 0–0.04)
IMM GRANULOCYTES NFR BLD AUTO: 0.4 % (ref 0–0.5)
KETONES UR QL STRIP: ABNORMAL
LACTATE SERPL-SCNC: 1.3 MMOL/L (ref 0.5–2.2)
LEUKOCYTE ESTERASE UR QL STRIP: NEGATIVE
LIPASE SERPL-CCNC: 101 U/L (ref 4–60)
LYMPHOCYTES # BLD AUTO: 1.6 K/UL (ref 1–4.8)
LYMPHOCYTES NFR BLD: 32.7 % (ref 18–48)
MCH RBC QN AUTO: 30 PG (ref 27–31)
MCHC RBC AUTO-ENTMCNC: 33.3 G/DL (ref 32–36)
MCV RBC AUTO: 90 FL (ref 82–98)
MONOCYTES # BLD AUTO: 0.4 K/UL (ref 0.3–1)
MONOCYTES NFR BLD: 7.5 % (ref 4–15)
NEUTROPHILS # BLD AUTO: 2.8 K/UL (ref 1.8–7.7)
NEUTROPHILS NFR BLD: 58.4 % (ref 38–73)
NITRITE UR QL STRIP: NEGATIVE
NRBC BLD-RTO: 0 /100 WBC
PH UR STRIP: 6 [PH] (ref 5–8)
PLATELET # BLD AUTO: 255 K/UL (ref 150–450)
PMV BLD AUTO: 9 FL (ref 9.2–12.9)
POTASSIUM SERPL-SCNC: 4.2 MMOL/L (ref 3.5–5.1)
PROT SERPL-MCNC: 7.5 G/DL (ref 6–8.4)
PROT UR QL STRIP: NEGATIVE
RBC # BLD AUTO: 4.7 M/UL (ref 4–5.4)
SODIUM SERPL-SCNC: 135 MMOL/L (ref 136–145)
SP GR UR STRIP: 1.02 (ref 1–1.03)
URN SPEC COLLECT METH UR: ABNORMAL
UROBILINOGEN UR STRIP-ACNC: NEGATIVE EU/DL
WBC # BLD AUTO: 4.8 K/UL (ref 3.9–12.7)

## 2024-08-04 PROCEDURE — 96374 THER/PROPH/DIAG INJ IV PUSH: CPT

## 2024-08-04 PROCEDURE — 85025 COMPLETE CBC W/AUTO DIFF WBC: CPT | Performed by: EMERGENCY MEDICINE

## 2024-08-04 PROCEDURE — 36415 COLL VENOUS BLD VENIPUNCTURE: CPT | Performed by: EMERGENCY MEDICINE

## 2024-08-04 PROCEDURE — 80053 COMPREHEN METABOLIC PANEL: CPT | Performed by: EMERGENCY MEDICINE

## 2024-08-04 PROCEDURE — 96361 HYDRATE IV INFUSION ADD-ON: CPT

## 2024-08-04 PROCEDURE — 99285 EMERGENCY DEPT VISIT HI MDM: CPT | Mod: 25

## 2024-08-04 PROCEDURE — 25000003 PHARM REV CODE 250: Performed by: EMERGENCY MEDICINE

## 2024-08-04 PROCEDURE — 83690 ASSAY OF LIPASE: CPT | Performed by: EMERGENCY MEDICINE

## 2024-08-04 PROCEDURE — 96375 TX/PRO/DX INJ NEW DRUG ADDON: CPT

## 2024-08-04 PROCEDURE — 81003 URINALYSIS AUTO W/O SCOPE: CPT | Performed by: EMERGENCY MEDICINE

## 2024-08-04 PROCEDURE — 81025 URINE PREGNANCY TEST: CPT | Performed by: EMERGENCY MEDICINE

## 2024-08-04 PROCEDURE — 63600175 PHARM REV CODE 636 W HCPCS: Performed by: EMERGENCY MEDICINE

## 2024-08-04 PROCEDURE — 83605 ASSAY OF LACTIC ACID: CPT | Performed by: EMERGENCY MEDICINE

## 2024-08-04 RX ORDER — SODIUM CHLORIDE 9 MG/ML
INJECTION, SOLUTION INTRAVENOUS CONTINUOUS
Status: DISCONTINUED | OUTPATIENT
Start: 2024-08-04 | End: 2024-08-04 | Stop reason: HOSPADM

## 2024-08-04 RX ORDER — DROPERIDOL 2.5 MG/ML
2.5 INJECTION, SOLUTION INTRAMUSCULAR; INTRAVENOUS
Status: COMPLETED | OUTPATIENT
Start: 2024-08-04 | End: 2024-08-04

## 2024-08-04 RX ORDER — TRAMADOL HYDROCHLORIDE 50 MG/1
50 TABLET ORAL EVERY 6 HOURS
Qty: 15 TABLET | Refills: 0 | Status: SHIPPED | OUTPATIENT
Start: 2024-08-04

## 2024-08-04 RX ORDER — METOCLOPRAMIDE 10 MG/1
10 TABLET ORAL EVERY 6 HOURS PRN
Qty: 30 TABLET | Refills: 0 | Status: SHIPPED | OUTPATIENT
Start: 2024-08-04

## 2024-08-04 RX ORDER — ALUMINUM HYDROXIDE, MAGNESIUM HYDROXIDE, AND SIMETHICONE 1200; 120; 1200 MG/30ML; MG/30ML; MG/30ML
5 SUSPENSION ORAL
Status: COMPLETED | OUTPATIENT
Start: 2024-08-04 | End: 2024-08-04

## 2024-08-04 RX ORDER — PANTOPRAZOLE SODIUM 40 MG/1
40 TABLET, DELAYED RELEASE ORAL
Status: DISCONTINUED | OUTPATIENT
Start: 2024-08-04 | End: 2024-08-04 | Stop reason: HOSPADM

## 2024-08-04 RX ORDER — KETOROLAC TROMETHAMINE 30 MG/ML
15 INJECTION, SOLUTION INTRAMUSCULAR; INTRAVENOUS
Status: COMPLETED | OUTPATIENT
Start: 2024-08-04 | End: 2024-08-04

## 2024-08-04 RX ADMIN — ALUMINUM HYDROXIDE, MAGNESIUM HYDROXIDE, DIMETHICONE 5 ML: 400; 400; 40 SUSPENSION ORAL at 09:08

## 2024-08-04 RX ADMIN — DROPERIDOL 2.5 MG: 2.5 INJECTION, SOLUTION INTRAMUSCULAR; INTRAVENOUS at 08:08

## 2024-08-04 RX ADMIN — KETOROLAC TROMETHAMINE 15 MG: 30 INJECTION, SOLUTION INTRAMUSCULAR at 08:08

## 2024-08-04 RX ADMIN — SODIUM CHLORIDE: 9 INJECTION, SOLUTION INTRAVENOUS at 08:08

## 2024-08-07 ENCOUNTER — HOSPITAL ENCOUNTER (OUTPATIENT)
Dept: RADIOLOGY | Facility: HOSPITAL | Age: 44
Discharge: HOME | End: 2024-08-07
Attending: FAMILY MEDICINE
Payer: MEDICAID

## 2024-08-07 DIAGNOSIS — R93.89 ABNORMAL FINDINGS ON DIAGNOSTIC IMAGING OF OTHER SPECIFIED BODY STRUCTURES: ICD-10-CM

## 2024-08-07 PROCEDURE — 76770 US EXAM ABDO BACK WALL COMP: CPT | Mod: 26,,, | Performed by: RADIOLOGY

## 2024-08-07 PROCEDURE — 76770 US EXAM ABDO BACK WALL COMP: CPT | Mod: TC,PO

## 2024-08-13 ENCOUNTER — TELEPHONE (OUTPATIENT)
Dept: HEMATOLOGY/ONCOLOGY | Facility: CLINIC | Age: 44
End: 2024-08-13
Payer: MEDICAID

## 2024-08-20 ENCOUNTER — OFFICE VISIT (OUTPATIENT)
Facility: CLINIC | Age: 44
End: 2024-08-20
Payer: MEDICAID

## 2024-08-20 ENCOUNTER — TELEPHONE (OUTPATIENT)
Facility: CLINIC | Age: 44
End: 2024-08-20

## 2024-08-20 VITALS
SYSTOLIC BLOOD PRESSURE: 115 MMHG | RESPIRATION RATE: 16 BRPM | BODY MASS INDEX: 19.89 KG/M2 | DIASTOLIC BLOOD PRESSURE: 82 MMHG | HEIGHT: 64 IN | WEIGHT: 116.5 LBS | HEART RATE: 71 BPM | TEMPERATURE: 97 F

## 2024-08-20 DIAGNOSIS — I26.99 OTHER PULMONARY EMBOLISM WITHOUT ACUTE COR PULMONALE, UNSPECIFIED CHRONICITY: ICD-10-CM

## 2024-08-20 DIAGNOSIS — D50.8 OTHER IRON DEFICIENCY ANEMIA: ICD-10-CM

## 2024-08-20 DIAGNOSIS — Z12.39 ENCOUNTER FOR SCREENING FOR MALIGNANT NEOPLASM OF BREAST, UNSPECIFIED SCREENING MODALITY: Primary | ICD-10-CM

## 2024-08-20 DIAGNOSIS — D68.59 ANTITHROMBIN III DEFICIENCY: Primary | ICD-10-CM

## 2024-08-20 DIAGNOSIS — E53.8 B12 DEFICIENCY: ICD-10-CM

## 2024-08-20 DIAGNOSIS — Z15.89 MTHFR MUTATION: ICD-10-CM

## 2024-08-20 PROCEDURE — 99999 PR PBB SHADOW E&M-EST. PATIENT-LVL IV: CPT | Mod: PBBFAC,,, | Performed by: INTERNAL MEDICINE

## 2024-08-20 PROCEDURE — 99214 OFFICE O/P EST MOD 30 MIN: CPT | Mod: PBBFAC,PN | Performed by: INTERNAL MEDICINE

## 2024-08-20 PROCEDURE — 99213 OFFICE O/P EST LOW 20 MIN: CPT | Mod: S$PBB,,, | Performed by: INTERNAL MEDICINE

## 2024-08-20 PROCEDURE — 3079F DIAST BP 80-89 MM HG: CPT | Mod: CPTII,,, | Performed by: INTERNAL MEDICINE

## 2024-08-20 PROCEDURE — G2211 COMPLEX E/M VISIT ADD ON: HCPCS | Mod: S$PBB,,, | Performed by: INTERNAL MEDICINE

## 2024-08-20 PROCEDURE — 3008F BODY MASS INDEX DOCD: CPT | Mod: CPTII,,, | Performed by: INTERNAL MEDICINE

## 2024-08-20 PROCEDURE — 1159F MED LIST DOCD IN RCRD: CPT | Mod: CPTII,,, | Performed by: INTERNAL MEDICINE

## 2024-08-20 PROCEDURE — 3074F SYST BP LT 130 MM HG: CPT | Mod: CPTII,,, | Performed by: INTERNAL MEDICINE

## 2024-08-20 NOTE — PROGRESS NOTES
[facility] Hematology/Oncology  PROGRESS NOTE      Subjective:       Patient ID:   NAME: Janeth Siddiqi : 1980     44 y.o. female    Referring Doc: Lizy Diamond  Other Physicians: Jere Noriega; Shazia; Az/Pearl    Chief Complaint:  Anemia/b12 f/u    History of Present Illness:     Patient returns today for a regularly scheduled follow-up visit.  She last showed for hematology clinic appointment back in 2023. The patient is here with her mom.     She is on eliquis. No excessive bleeding or bruising     No CP, SOB, HA's or N/V. Perimenopausal issues - followed by Dr Oquendo with GYN. She is now on spironolactone     She has not been taking B12     She previously had possible TIA in 2023   and has since been seeing Dr Ramirez with neurology; she was on atorvastatin, topamax and Nurtec      Discussed covid 19 precautions      ROS:   GEN: normal without any fever, night sweats or weight loss; perimenopausal symptoms  HEENT: normal with no HA's, sore throat, stiff neck, changes in vision  CV: normal with no CP, SOB, PND, KRAUSE or orthopnea  PULM: normal with no SOB, cough, hemoptysis, sputum or pleuritic pain  GI: no new issues  : normal with no hematuria, dysuria  BREAST: normal with no mass, discharge, pain  SKIN: normal with no rash, erythema, bruising, or swelling    Allergies:  Review of patient's allergies indicates:   Allergen Reactions    Iron analogues Anaphylaxis     Infusion only. Tolerates po    Bactrim [sulfamethoxazole-trimethoprim] Swelling    Lidocaine Swelling    Nsaids (non-steroidal anti-inflammatory drug)      cannot take NSAIDs- upsets the stomach    Zofran [ondansetron hcl (pf)] Nausea Only       Medications:    Current Outpatient Medications:     clonazePAM (KLONOPIN) 1 MG tablet, Take 1 mg by mouth daily as needed., Disp: , Rfl:     ELIQUIS 5 mg Tab, TAKE 1 TABLET(5 MG) BY MOUTH TWICE DAILY, Disp: 60 tablet, Rfl: 3    EVAMIST 1.53 mg/spray (1.7%)  transdermal spray, Place onto the skin once daily., Disp: , Rfl:     ferrous sulfate (FEOSOL) 325 mg (65 mg iron) Tab tablet, Take 1 tablet by mouth every evening., Disp: , Rfl:     hydrocortisone 2.5 % cream, Apply topically 2 (two) times daily as needed., Disp: , Rfl:     hydrOXYzine (ATARAX) 50 MG tablet, Take 50 mg by mouth nightly as needed., Disp: , Rfl:     LINZESS 290 mcg Cap, Take 290 mcg by mouth once daily. , Disp: , Rfl: 1    magnesium oxide 400 mg magnesium Tab, Take 500 mg by mouth once daily., Disp: , Rfl:     metoclopramide HCl (REGLAN) 10 MG tablet, Take 1 tablet (10 mg total) by mouth every 6 (six) hours as needed (Nausea or abdominal pain)., Disp: 30 tablet, Rfl: 0    norethindrone-ethinyl estradiol (JUNEL FE 1/20) 1 mg-20 mcg (21)/75 mg (7) per tablet, Take 1 tablet by mouth once daily., Disp: , Rfl:     polyethylene glycol (GLYCOLAX) 17 gram/dose powder, Take 17 g by mouth once daily., Disp: , Rfl:     potassium chloride (KLOR-CON) 10 MEQ TbSR, Take 10 mEq by mouth once daily., Disp: , Rfl:     spironolactone (ALDACTONE) 25 MG tablet, Take 25 mg by mouth 2 (two) times a day., Disp: , Rfl:     topiramate (TOPAMAX) 200 MG Tab, Take 200 mg by mouth once daily., Disp: , Rfl:     traMADoL (ULTRAM) 50 mg tablet, Take 1 tablet (50 mg total) by mouth every 6 (six) hours., Disp: 15 tablet, Rfl: 0    traZODone (DESYREL) 150 MG tablet, Take 300 mg by mouth nightly., Disp: , Rfl:     triamcinolone acetonide 0.1% (KENALOG) 0.1 % cream, Apply 1 g topically 2 (two) times daily., Disp: , Rfl:   No current facility-administered medications for this visit.    Facility-Administered Medications Ordered in Other Visits:     diphenhydrAMINE injection 25 mg, 25 mg, Intravenous, Q6H PRN, Aric Driver MD    HYDROmorphone injection 0.5 mg, 0.5 mg, Intravenous, Q5 Min PRN, Aric Driver MD, 0.5 mg at 09/28/22 0958    ondansetron injection 4 mg, 4 mg, Intravenous, Daily PRN, Aric Driver MD     "prochlorperazine injection Soln 5 mg, 5 mg, Intravenous, Q30 Min PRN, Aric Driver MD    sodium chloride 0.9% bolus 250 mL, 250 mL, Intravenous, Once, Aric Driver MD    PMHx/PSHx Updates:  See patient's last visit with me on 7/18/2023  See H&P on 12/7/16        Pathology:  none          Objective:     Vitals:  Blood pressure 115/82, pulse 71, temperature 97.4 °F (36.3 °C), resp. rate 16, height 5' 4" (1.626 m), weight 52.8 kg (116 lb 8 oz), last menstrual period 07/17/2024.    Physical Examination:   GEN: no apparent distress, comfortable; AAOx3  HEAD: atraumatic and normocephalic  EYES: no pallor, no icterus, PERRLA  ENT: OMM, no pharyngeal erythema, external ears WNL; no nasal discharge; no thrush  NECK: no masses, thyroid normal, trachea midline, no LAD/LN's, supple  CV: RRR with no murmur; normal pulse; normal S1 and S2; no pedal edema  CHEST: Normal respiratory effort; CTAB; normal breath sounds; no wheeze or crackles  ABDOM:  nondistended; soft; normal bowel sounds; no rebound/guarding;   MUSC/Skeletal: ROM normal; no crepitus; joints normal; no deformities or arthropathy  EXTREM: no clubbing, cyanosis, inflammation or swelling  SKIN: no rashes, lesions, ulcers, petechiae or subcutaneous nodules  : no reza  NEURO: grossly intact; motor/sensory WNL; AAOx3; no tremors  PSYCH: normal mood, affect and behavior  LYMPH: normal cervical, supraclavicular, axillary and groin LN's            Labs:               Lab Results   Component Value Date    WBC 4.5 08/16/2024    HGB 13.9 08/16/2024    HCT 42.5 08/16/2024    MCV 92.2 08/16/2024     08/16/2024     BMP  Lab Results   Component Value Date     08/16/2024    K 4.3 08/16/2024     08/16/2024    CO2 24 08/16/2024    BUN 13 08/16/2024    CREATININE 0.88 08/16/2024    CALCIUM 8.8 08/16/2024    ANIONGAP 10 08/04/2024    ESTGFRAFRICA >60.0 07/20/2022    EGFRNONAA >60.0 07/20/2022      Lab Results   Component Value Date    IRON 65 " 08/16/2024    TIBC 290 08/16/2024    FERRITIN 27 08/16/2024            Lab Results   Component Value Date    DPWCDZNC23 270 01/12/2024     Lab Results   Component Value Date    FOLATE 8.5 01/12/2024             Radiology/Diagnostic Studies:    MRI Brain w/WO 6/5/2023:    IMPRESSION:     Small focal area of signal alteration and postcontrast enhancement within the upper aspect of the sharee towards the left of midline potentially representing an occult vascular malformation, stable compared to multiple prior studies.     No acute intracranial abnormality.    CTA head 4/25/2023:    Impression:     1. There is no acute abnormality.  There is no hemorrhage, mass or obvious acute infarction.  2. The cervical vertebral and carotid arteries are patent.  3. The intracranial arteries are patent without large vessel occlusion or critical stenosis.  It should be noted that MRI is more sensitive in the detection of subtle or acute nonhemorrhagic ischemic disease.  4. 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 INDICATION: Obstruction. COMPARISON: KUB 1/1/14. FINDINGS: Supine and upright radiographs of the abdomen demonstrate a nonobstructive bowel gas pattern. There is no free air. There is no pneumatosis. There is no portal venous gas. There is calcification. Lung bases are clear. There is no osseous abnormality.    1.Nonobstructed bowel gas pattern. Electronically signed by: KELBY DENNIS MD Date:     07/21/17 Time:    13:49     Ct Abdomen Pelvis With Contrast    Result Date: 7/21/2017  Procedure: CT of the abdomen and pelvis with IV contrast. Technique: Axial images of the abdomen and pelvis were obtained with intravenous contrast administration. Coronal and  sagittal reconstructions were provided. Total DLP was 691 mGy-cm.  Dose lowering technique, automated exposure control, was utilized for this exam. History: Left-sided abdominal pain and vomiting. Comparison: CT of the abdomen and pelvis 7/31/2015. Findings: The bilateral lung bases are clear. The heart is normal in size. There is a 7 mm focus of arterial enhancement within segment 4B of the liver which is isodense to the adjacent liver on delayed phase. This most consistent with a flash filling hemangioma. The portal vein is patent. The spleen, pancreas, and adrenal glands are normal. Bilateral kidneys are normal. There is no hydronephrosis or nephrolithiasis. The stomach and small bowel are decompressed. The appendix is not visualized, however there is no right lower quadrant inflammatory stranding. The colon is normal. The uterus and adnexa are normal for age. Urinary bladder is normal. There is trace physiologic free fluid in the lower pelvis. There is no pelvic or retroperitoneal adenopathy. The abdominal aorta is non-aneurysmal. There is no lytic or blastic osseous lesions.     No acute abnormality of the abdomen and pelvis. Electronically signed by: KELBY DENNIS MD Date:     07/21/17 Time:    15:37       I have reviewed all available lab results and radiology reports.    Assessment/Plan:   (1) 44 y.o. female with diagnosis of iron deficiency anemia and B12 deficiency  - she was previously on oral iron   - latest ferritin is 124 and much better  - B12 adeqaute  - hgb is 14.0 and WNL and stable    2/28/2022:  - latest hgb adequate at 13.2    6/20/2022:  - no recent CBc labs since Feb 2022 1/18/2023:  - latest CBC adequate  - iron panel adequate  - continued on oral iron  - B12 at 384; folate a little low  - add folic acid po daily and recommend continue B12 monthly    7/18/2023:  - latest CBC adequate  - iron panel, b12/folate adequate    8/20/2024:  - latest CBC WNL with no anemia  - iron panel adequate  - B12  at 223 but she has not been taking B12 supplements    (2) prior Pulmonary emboli with prior use of OCP's  - on oral anticoagulant drug Eliquis  - underlying MTHFR-A heterozygous and ATIII deficiency issues  - followed by Dr Mccray with Pulm and he ordered several tests and  workup for cystic fibrosis which was negative    2/28/2022:  - continued on eliquis  - no excessive bleeding or brusiing    6/20/2022:  - continued on eliquis  - no excessive bleeding or bruising    1/18/2023:  - she is continued on eliquis  - no excessive bleeding or bruising    7/18/2023:  - continued on eliquis  - no excessive bleeding or bruising    8/20/2024:  - She last showed for hematology clinic appointment back in July 2023. The patient is here with her mom.   - She is on eliquis. No excessive bleeding or bruising   - Perimenopausal issues - followed by Dr Oqunedo with GYN. She is now on spironolactone        (3) Crohn's-like disorder/pancreatitis - followed by Dr Noriega with GI in past and now Dr Aric Sinclair with GI at [facility]    (4) Chronic GYN issues with prior bleeding (resolved) - followed by Dr Gigi Oquendo with GYN    2/28/2022:  - She had surgery on right ovary in mid-Dec 2021; she has had some abdominal discomfort and occasional nausea since that time. Dr Willis did the surgery. They suspect she is having a lot of scar tissue issues. She has been on antibiotics.  - She is seeing Dr Greenfield with GI and is seeing him again in 2 wees.     6/20/2022:  - She previously had surgery on right ovary in mid-Dec 2021; she has had some residual stomach issues. She is planning to have another surgery in the near future with Dr Perez and Dr Willis     1/18/2023:  - she had surgery in Sept 2022 with adhesions and hernia repair    (5) prior Vit D deficiency    (6) She saw Dr Khoobehi with plastics in [place] and had a Voluma skin filler without any difficulties     (7) SVT - seeing Dr Bernardo  and now on medications    (8)  TIA    7/18/2023:  -  She had possible TIA in April 2023 and was admitted at [facility] and has since been seeing Dr Ramirez with neurology; she is atorvastatin, topamax and Nurtec        1. Antithrombin III deficiency        2. Other pulmonary embolism without acute cor pulmonale, unspecified chronicity        3. Other iron deficiency anemia        4. B12 deficiency        5. MTHFR mutation            PLAN:  1. Check labs every 6 months ; continue eliquis; folic acid po daily - she self-discontinued the B12  2. F/u with PCP , GYN, and pulm  3. F/u with Dr Greenfield with [facility] as directed  4. F/u with GYN with Dr Oquendo  5. hold her eliquis 2-3 days prior to any surgery; she is considered a higher risk than the normal population for clot events  6. F/u with neurology as directed     RTC  in 6 months    Fax note to Jere Mccray, Khoobehi, Lizy Diamond, Yahir; Peter Kang; James; Ck Savage (PCP)    Discussion:       COVID-19 Discussion:    I had long discussion with patient and any applicable family about the COVID-19 coronavirus epidemic and the recommended precautions with regard to cancer and/or hematology patients. I have re-iterated the CDC recommendations for adequate hand washing, use of hand -like products, and coughing into elbow, etc. In addition, especially for our patients who are on chemotherapy and/or our otherwise immunocompromised patients, I have recommended avoidance of crowds, including movie theaters, restaurants, churches, etc. I have recommended avoidance of any sick or symptomatic family members and/or friends. I have also recommended avoidance of any raw and unwashed food products, and general avoidance of food items that have not been prepared by themselves. The patient has been asked to call us immediately with any symptom developments, issues, questions or other general concerns.     I have explained all of the above in detail and the patient  understands all of the current recommendation(s). I have answered all of their questions to the best of my ability and to their complete satisfaction.   The patient is to continue with the current management plan.            Electronically signed by Miguel Hurst MD

## 2024-09-09 ENCOUNTER — HOSPITAL ENCOUNTER (OUTPATIENT)
Dept: RADIOLOGY | Facility: HOSPITAL | Age: 44
Discharge: HOME | End: 2024-09-09
Attending: INTERNAL MEDICINE
Payer: MEDICAID

## 2024-09-09 DIAGNOSIS — Z12.39 ENCOUNTER FOR SCREENING FOR MALIGNANT NEOPLASM OF BREAST, UNSPECIFIED SCREENING MODALITY: ICD-10-CM

## 2024-09-09 DIAGNOSIS — R92.8 ABNORMAL MAMMOGRAM: ICD-10-CM

## 2024-10-03 ENCOUNTER — HOSPITAL ENCOUNTER (OUTPATIENT)
Dept: RADIOLOGY | Facility: HOSPITAL | Age: 44
Discharge: HOME | End: 2024-10-03
Attending: INTERNAL MEDICINE
Payer: MEDICAID

## 2024-10-03 VITALS — HEIGHT: 64 IN | BODY MASS INDEX: 19.81 KG/M2 | WEIGHT: 116 LBS

## 2024-10-03 DIAGNOSIS — R92.8 ABNORMAL MAMMOGRAM: ICD-10-CM

## 2024-10-03 PROCEDURE — 77066 DX MAMMO INCL CAD BI: CPT | Mod: 26,,, | Performed by: RADIOLOGY

## 2024-10-03 PROCEDURE — 77062 BREAST TOMOSYNTHESIS BI: CPT | Mod: 26,,, | Performed by: RADIOLOGY

## 2024-10-03 PROCEDURE — 76642 ULTRASOUND BREAST LIMITED: CPT | Mod: TC,PO,RT

## 2024-10-03 PROCEDURE — 77066 DX MAMMO INCL CAD BI: CPT | Mod: TC,PO

## 2024-10-03 PROCEDURE — 77062 BREAST TOMOSYNTHESIS BI: CPT | Mod: TC,PO

## 2024-10-03 PROCEDURE — 76642 ULTRASOUND BREAST LIMITED: CPT | Mod: 26,RT,, | Performed by: RADIOLOGY

## 2024-10-04 ENCOUNTER — TELEPHONE (OUTPATIENT)
Facility: CLINIC | Age: 44
End: 2024-10-04
Payer: MEDICAID

## 2024-10-04 DIAGNOSIS — Z91.89 AT HIGH RISK FOR BREAST CANCER: Primary | ICD-10-CM

## 2024-10-04 NOTE — TELEPHONE ENCOUNTER
----- Message from RN Seema sent at 10/3/2024  3:50 PM CDT -----  I was going to call her with stable report but her TC score looks to be 34. Just wanted to see if I should offer her option of seeing high risk breast clinic?  ----- Message -----  From: Miguel Hurst MD  Sent: 10/3/2024  11:15 AM CDT  To: Natali Rivera Staff    Please call her with the stable report

## 2024-10-04 NOTE — TELEPHONE ENCOUNTER
Spoke with pt in regards to mammo results. Also offered pt an appt with high risk breast clinic due to TC score 34%. Appt made. Pt verbalized understanding.

## 2024-11-07 ENCOUNTER — TELEPHONE (OUTPATIENT)
Facility: CLINIC | Age: 44
End: 2024-11-07
Payer: MEDICAID

## 2024-11-07 NOTE — NURSING
Oncology Navigation   Intake  Initial Nurse Navigator Contact: 11/07/24  Appointment Date: 12/09/24     Treatment                              Acuity      Follow Up  No follow-ups on file.     Pt called to reschedule high risk breast clinic appt due to being sick.

## 2024-11-28 DIAGNOSIS — I26.99 OTHER PULMONARY EMBOLISM WITHOUT ACUTE COR PULMONALE, UNSPECIFIED CHRONICITY: ICD-10-CM

## 2024-11-28 DIAGNOSIS — E53.8 B12 DEFICIENCY: ICD-10-CM

## 2024-11-28 DIAGNOSIS — Z15.89 MTHFR MUTATION: ICD-10-CM

## 2024-11-29 RX ORDER — APIXABAN 5 MG/1
5 TABLET, FILM COATED ORAL 2 TIMES DAILY
Qty: 60 TABLET | Refills: 3 | Status: SHIPPED | OUTPATIENT
Start: 2024-11-29

## 2024-12-02 ENCOUNTER — TELEPHONE (OUTPATIENT)
Facility: CLINIC | Age: 44
End: 2024-12-02
Payer: MEDICAID

## 2024-12-02 NOTE — TELEPHONE ENCOUNTER
Spoke with pt in regards to rescheduling high risk breast clinic. Pt wishes to call back to reschedule.

## 2025-01-15 NOTE — ASSESSMENT & PLAN NOTE
Consult Neurology  Check CMP,CBC, Mag, Phos  Fasting Lipid Panel  MRI w/o contrast  CTA head and neck -reviewed  Antiplatelet therapy-defer to neurology as pt chronically taking eliquis  ECHO for potential embolic source  Statin for ABCD2 >2 or LDL>90  PT/OT functional assessment if needed       non-distended/non-tender

## 2025-02-14 ENCOUNTER — TELEPHONE (OUTPATIENT)
Facility: CLINIC | Age: 45
End: 2025-02-14
Payer: MEDICAID

## 2025-02-14 DIAGNOSIS — D68.59 ANTITHROMBIN III DEFICIENCY: ICD-10-CM

## 2025-02-14 DIAGNOSIS — D50.8 OTHER IRON DEFICIENCY ANEMIA: ICD-10-CM

## 2025-02-14 DIAGNOSIS — E53.8 B12 DEFICIENCY: Primary | ICD-10-CM

## 2025-02-19 NOTE — PROGRESS NOTES
[facility] Hematology/Oncology  PROGRESS NOTE      Subjective:       Patient ID:   NAME: Janeth Siddiqi : 1980     45 y.o. female    Referring Doc: Lizy Diamond  Other Physicians: Jere Noriega; Shazia; Az/Pearl    Chief Complaint:  Anemia/b12 f/u    History of Present Illness:     Patient returns today for a regularly scheduled follow-up visit.  She last showed for hematology clinic appointment back in Aug 2024. The patient is here with her mom.     She is on eliquis. No excessive bleeding or bruising     No CP, SOB, HA's or N/V.     She had polyps in her uterus; followed by Dr Oquendo with GYN in past and now seeing Dr Lima; she also saw Dr Rashaad Will on 2025.     She saw Dr Timur Turner with endocrinology in Dec 2024      She saw Dr Aric Sinclair with GI in 2024 for her chronic gastroparesis issues     She previously had had possible TIA in 2023 and has since been seeing Dr Ramirez with neurology but she has not seen her in some time           ROS:   GEN: normal without any fever, night sweats or weight loss;    HEENT: normal with no HA's, sore throat, stiff neck, changes in vision  CV: normal with no CP, SOB, PND, KRAUSE or orthopnea  PULM: normal with no SOB, cough, hemoptysis, sputum or pleuritic pain  GI: no new issues  : normal with no hematuria, dysuria  BREAST: normal with no mass, discharge, pain  SKIN: normal with no rash, erythema, bruising, or swelling    Allergies:  Review of patient's allergies indicates:   Allergen Reactions    Iron analogues Anaphylaxis     Infusion only. Tolerates po    Bactrim [sulfamethoxazole-trimethoprim] Swelling    Lidocaine Swelling    Nsaids (non-steroidal anti-inflammatory drug)      cannot take NSAIDs- upsets the stomach    Zofran [ondansetron hcl (pf)] Nausea Only       Medications:    Current Outpatient Medications:     clonazePAM (KLONOPIN) 1 MG tablet, Take 1 mg by mouth daily as needed., Disp: , Rfl:     ELIQUIS 5 mg  Tab, TAKE 1 TABLET(5 MG) BY MOUTH TWICE DAILY, Disp: 60 tablet, Rfl: 3    EVAMIST 1.53 mg/spray (1.7%) transdermal spray, Place onto the skin once daily., Disp: , Rfl:     ferrous sulfate (FEOSOL) 325 mg (65 mg iron) Tab tablet, Take 1 tablet by mouth every evening., Disp: , Rfl:     hydrocortisone 2.5 % cream, Apply topically 2 (two) times daily as needed., Disp: , Rfl:     hydrOXYzine (ATARAX) 50 MG tablet, Take 50 mg by mouth nightly as needed., Disp: , Rfl:     LINZESS 290 mcg Cap, Take 290 mcg by mouth once daily. , Disp: , Rfl: 1    magnesium oxide 400 mg magnesium Tab, Take 500 mg by mouth once daily., Disp: , Rfl:     metoclopramide HCl (REGLAN) 10 MG tablet, Take 1 tablet (10 mg total) by mouth every 6 (six) hours as needed (Nausea or abdominal pain)., Disp: 30 tablet, Rfl: 0    norethindrone-ethinyl estradiol (JUNEL FE 1/20) 1 mg-20 mcg (21)/75 mg (7) per tablet, Take 1 tablet by mouth once daily., Disp: , Rfl:     polyethylene glycol (GLYCOLAX) 17 gram/dose powder, Take 17 g by mouth once daily., Disp: , Rfl:     potassium chloride (KLOR-CON) 10 MEQ TbSR, Take 10 mEq by mouth once daily., Disp: , Rfl:     spironolactone (ALDACTONE) 25 MG tablet, Take 25 mg by mouth 2 (two) times a day., Disp: , Rfl:     topiramate (TOPAMAX) 200 MG Tab, Take 200 mg by mouth once daily., Disp: , Rfl:     traMADoL (ULTRAM) 50 mg tablet, Take 1 tablet (50 mg total) by mouth every 6 (six) hours., Disp: 15 tablet, Rfl: 0    traZODone (DESYREL) 150 MG tablet, Take 300 mg by mouth nightly., Disp: , Rfl:     triamcinolone acetonide 0.1% (KENALOG) 0.1 % cream, Apply 1 g topically 2 (two) times daily., Disp: , Rfl:   No current facility-administered medications for this visit.    Facility-Administered Medications Ordered in Other Visits:     diphenhydrAMINE injection 25 mg, 25 mg, Intravenous, Q6H PRN, Aric Driver MD    HYDROmorphone injection 0.5 mg, 0.5 mg, Intravenous, Q5 Min PRN, Aric Driver MD, 0.5 mg at  "09/28/22 0958    ondansetron injection 4 mg, 4 mg, Intravenous, Daily PRN, Aric Driver MD    prochlorperazine injection Soln 5 mg, 5 mg, Intravenous, Q30 Min PRN, Aric Driver MD    sodium chloride 0.9% bolus 250 mL, 250 mL, Intravenous, Once, Aric Driver MD    PMHx/PSHx Updates:  See patient's last visit with me on 8/20/2024  See H&P on 12/7/16        Pathology:  none          Objective:     Vitals:  Blood pressure 107/79, pulse 75, temperature 97.5 °F (36.4 °C), temperature source Temporal, resp. rate 18, height 5' 4" (1.626 m), weight 53.8 kg (118 lb 11.2 oz).    Physical Examination:   GEN: no apparent distress, comfortable; AAOx3  HEAD: atraumatic and normocephalic  EYES: no pallor, no icterus, PERRLA  ENT: OMM, no pharyngeal erythema, external ears WNL; no nasal discharge; no thrush  NECK: no masses, thyroid normal, trachea midline, no LAD/LN's, supple  CV: RRR with no murmur; normal pulse; normal S1 and S2; no pedal edema  CHEST: Normal respiratory effort; CTAB; normal breath sounds; no wheeze or crackles  ABDOM:  nondistended; soft; normal bowel sounds; no rebound/guarding;   MUSC/Skeletal: ROM normal; no crepitus; joints normal; no deformities or arthropathy  EXTREM: no clubbing, cyanosis, inflammation or swelling  SKIN: no rashes, lesions, ulcers, petechiae or subcutaneous nodules  : no reza  NEURO: grossly intact; motor/sensory WNL; AAOx3; no tremors  PSYCH: normal mood, affect and behavior  LYMPH: normal cervical, supraclavicular, axillary and groin LN's            Labs:               Lab Results   Component Value Date    WBC 4.6 02/17/2025    HGB 14.2 02/17/2025    HCT 42.9 02/17/2025    MCV 89.9 02/17/2025     02/17/2025     BMP  Lab Results   Component Value Date     02/17/2025    K 4.4 02/17/2025     02/17/2025    CO2 25 02/17/2025    BUN 12 02/17/2025    CREATININE 0.95 02/17/2025    CALCIUM 9.3 02/17/2025    ANIONGAP 10 08/04/2024    ESTGFRAFRICA " >60.0 07/20/2022    EGFRNONAA >60.0 07/20/2022      Lab Results   Component Value Date    IRON 106 02/17/2025    TIBC 303 02/17/2025    FERRITIN 35 02/17/2025            Lab Results   Component Value Date    CGHPMJWZ29 991 02/17/2025     Lab Results   Component Value Date    FOLATE 8.5 01/12/2024             Radiology/Diagnostic Studies:    MRI Brain w/WO 6/5/2023:    IMPRESSION:     Small focal area of signal alteration and postcontrast enhancement within the upper aspect of the sharee towards the left of midline potentially representing an occult vascular malformation, stable compared to multiple prior studies.     No acute intracranial abnormality.    CTA head 4/25/2023:    Impression:     1. There is no acute abnormality.  There is no hemorrhage, mass or obvious acute infarction.  2. The cervical vertebral and carotid arteries are patent.  3. The intracranial arteries are patent without large vessel occlusion or critical stenosis.  It should be noted that MRI is more sensitive in the detection of subtle or acute nonhemorrhagic ischemic disease.  4. 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 INDICATION: Obstruction. COMPARISON: KUB 1/1/14. FINDINGS: Supine and upright radiographs of the abdomen demonstrate a nonobstructive bowel gas pattern. There is no free air. There is no pneumatosis. There is no portal venous gas. There is calcification. Lung bases are clear. There is no osseous abnormality.    1.Nonobstructed bowel gas pattern. Electronically signed by: KELBY DENNIS MD Date:     07/21/17 Time:    13:49     Ct Abdomen Pelvis With Contrast    Result Date: 7/21/2017  Procedure: CT of the abdomen and pelvis with IV contrast. Technique: Axial images of  the abdomen and pelvis were obtained with intravenous contrast administration. Coronal and sagittal reconstructions were provided. Total DLP was 691 mGy-cm.  Dose lowering technique, automated exposure control, was utilized for this exam. History: Left-sided abdominal pain and vomiting. Comparison: CT of the abdomen and pelvis 7/31/2015. Findings: The bilateral lung bases are clear. The heart is normal in size. There is a 7 mm focus of arterial enhancement within segment 4B of the liver which is isodense to the adjacent liver on delayed phase. This most consistent with a flash filling hemangioma. The portal vein is patent. The spleen, pancreas, and adrenal glands are normal. Bilateral kidneys are normal. There is no hydronephrosis or nephrolithiasis. The stomach and small bowel are decompressed. The appendix is not visualized, however there is no right lower quadrant inflammatory stranding. The colon is normal. The uterus and adnexa are normal for age. Urinary bladder is normal. There is trace physiologic free fluid in the lower pelvis. There is no pelvic or retroperitoneal adenopathy. The abdominal aorta is non-aneurysmal. There is no lytic or blastic osseous lesions.     No acute abnormality of the abdomen and pelvis. Electronically signed by: KELBY DENNIS MD Date:     07/21/17 Time:    15:37       I have reviewed all available lab results and radiology reports.    Assessment/Plan:   (1) 45 y.o. female with diagnosis of iron deficiency anemia and B12 deficiency  - she was previously on oral iron   - latest ferritin is 124 and much better  - B12 adeqaute  - hgb is 14.0 and WNL and stable    2/28/2022:  - latest hgb adequate at 13.2    6/20/2022:  - no recent CBc labs since Feb 2022 1/18/2023:  - latest CBC adequate  - iron panel adequate  - continued on oral iron  - B12 at 384; folate a little low  - add folic acid po daily and recommend continue B12 monthly    7/18/2023:  - latest CBC adequate  - iron panel,  b12/folate adequate    8/20/2024:  - latest CBC WNL with no anemia  - iron panel adequate  - B12 at 223 but she has not been taking B12 supplements    2/20/2025:  - latest CBc adequate  - normal iron panel and b12/folate  - no current anemia    (2) prior Pulmonary emboli with prior use of OCP's  - on oral anticoagulant drug Eliquis  - underlying MTHFR-A heterozygous and ATIII deficiency issues  - followed by Dr Mccray with Pulm and he ordered several tests and  workup for cystic fibrosis which was negative    2/28/2022:  - continued on eliquis  - no excessive bleeding or brusiing    6/20/2022:  - continued on eliquis  - no excessive bleeding or bruising    1/18/2023:  - she is continued on eliquis  - no excessive bleeding or bruising    7/18/2023:  - continued on eliquis  - no excessive bleeding or bruising    8/20/2024:  - She last showed for hematology clinic appointment back in July 2023. The patient is here with her mom.   - She is on eliquis. No excessive bleeding or bruising   - Perimenopausal issues - followed by Dr Oquendo with GYN. She is now on spironolactone     2/20/2024:  - continued on eliquis  - she is considered a higher risk than the normal population for both clotting and bleeding complications with any surgical procedures       (3) Crohn's-like disorder/pancreatitis - followed by Dr Noriega with GI in past and now Dr Aric Sinclair with GI at [facility]    (4) Chronic GYN issues with prior bleeding (resolved) - followed by Dr Gigi Oquendo with GYN    2/28/2022:  - She had surgery on right ovary in mid-Dec 2021; she has had some abdominal discomfort and occasional nausea since that time. Dr Willis did the surgery. They suspect she is having a lot of scar tissue issues. She has been on antibiotics.  - She is seeing Dr Greenfield with GI and is seeing him again in 2 wees.     6/20/2022:  - She previously had surgery on right ovary in mid-Dec 2021; she has had some residual stomach issues. She is  planning to have another surgery in the near future with Dr Perez and Dr Willis     1/18/2023:  - she had surgery in Sept 2022 with adhesions and hernia repair    2/20/2025:  - she is seeing Dr Lima with GYn now  - she is considering surgical options for uterus in near future    (5) prior Vit D deficiency    (6) She saw Dr Khoobehi with plastics in [place] and had a Voluma skin filler without any difficulties     (7) SVT - seeing Dr Bernardo  and now on medications    (8) TIA    7/18/2023:  -  She had possible TIA in April 2023 and was admitted at [facility] and has since been seeing Dr Ramirez with neurology; she is atorvastatin, topamax and Nurtec        1. Other iron deficiency anemia        2. Antithrombin III deficiency        3. Other pulmonary embolism without acute cor pulmonale, unspecified chronicity        4. B12 deficiency        5. MTHFR mutation            PLAN:  1. Check labs every 6 months ; continue eliquis; folic acid po daily   2. F/u with PCP , GYN, and pulm  3. F/u with Dr Greenfield with [facility] as directed  4. F/u with GYN with Dr Lima  5. hold her eliquis 2-3 days prior to any surgery; she is considered a higher risk than the normal population for clot and or bleeding complications  6. F/u with neurology as directed     RTC  in 6 months    Fax note to Radha Mccray (GYN), Khoobehi, Kate Brown, Hutchings; Peter Kang; James; Ck Savage (PCP)    Discussion:       COVID-19 Discussion:    I had long discussion with patient and any applicable family about the COVID-19 coronavirus epidemic and the recommended precautions with regard to cancer and/or hematology patients. I have re-iterated the CDC recommendations for adequate hand washing, use of hand -like products, and coughing into elbow, etc. In addition, especially for our patients who are on chemotherapy and/or our otherwise immunocompromised patients, I have recommended avoidance of  crowds, including movie theaters, restaurants, churches, etc. I have recommended avoidance of any sick or symptomatic family members and/or friends. I have also recommended avoidance of any raw and unwashed food products, and general avoidance of food items that have not been prepared by themselves. The patient has been asked to call us immediately with any symptom developments, issues, questions or other general concerns.     I have explained all of the above in detail and the patient understands all of the current recommendation(s). I have answered all of their questions to the best of my ability and to their complete satisfaction.   The patient is to continue with the current management plan.            Electronically signed by Miguel Hurst MD

## 2025-02-20 ENCOUNTER — OFFICE VISIT (OUTPATIENT)
Facility: CLINIC | Age: 45
End: 2025-02-20
Payer: MEDICAID

## 2025-02-20 VITALS
BODY MASS INDEX: 20.26 KG/M2 | TEMPERATURE: 98 F | DIASTOLIC BLOOD PRESSURE: 79 MMHG | WEIGHT: 118.69 LBS | RESPIRATION RATE: 18 BRPM | SYSTOLIC BLOOD PRESSURE: 107 MMHG | HEART RATE: 75 BPM | HEIGHT: 64 IN

## 2025-02-20 DIAGNOSIS — I26.99 OTHER PULMONARY EMBOLISM WITHOUT ACUTE COR PULMONALE, UNSPECIFIED CHRONICITY: ICD-10-CM

## 2025-02-20 DIAGNOSIS — Z15.89 MTHFR MUTATION: ICD-10-CM

## 2025-02-20 DIAGNOSIS — D68.59 ANTITHROMBIN III DEFICIENCY: ICD-10-CM

## 2025-02-20 DIAGNOSIS — E53.8 B12 DEFICIENCY: ICD-10-CM

## 2025-02-20 DIAGNOSIS — D50.8 OTHER IRON DEFICIENCY ANEMIA: Primary | ICD-10-CM

## 2025-02-20 PROCEDURE — 99214 OFFICE O/P EST MOD 30 MIN: CPT | Mod: PBBFAC,PN | Performed by: INTERNAL MEDICINE

## 2025-02-28 ENCOUNTER — OFFICE VISIT (OUTPATIENT)
Dept: OBSTETRICS AND GYNECOLOGY | Facility: CLINIC | Age: 45
End: 2025-02-28
Payer: MEDICAID

## 2025-02-28 VITALS — BODY MASS INDEX: 20.38 KG/M2 | WEIGHT: 118.69 LBS

## 2025-02-28 DIAGNOSIS — R10.2 CHRONIC PELVIC PAIN IN FEMALE: ICD-10-CM

## 2025-02-28 DIAGNOSIS — G89.29 CHRONIC PELVIC PAIN IN FEMALE: ICD-10-CM

## 2025-02-28 DIAGNOSIS — K59.00 DYSCHEZIA: ICD-10-CM

## 2025-02-28 DIAGNOSIS — N80.9 ENDOMETRIOSIS: Primary | ICD-10-CM

## 2025-02-28 PROCEDURE — 99999 PR PBB SHADOW E&M-EST. PATIENT-LVL III: CPT | Mod: PBBFAC,,, | Performed by: OBSTETRICS & GYNECOLOGY

## 2025-02-28 PROCEDURE — 99213 OFFICE O/P EST LOW 20 MIN: CPT | Mod: PBBFAC | Performed by: OBSTETRICS & GYNECOLOGY

## 2025-02-28 NOTE — PROGRESS NOTES
"Cc:  2nd opinion for hysterectomy    HPI:  45 yro G0 presents after being told she needs a hysterectomy due to "enlarged uterus and fibroids."  Pt is currently on OCP's and states bleeding is not even monthly.  Pt does not want to give up possibility of future fertility.  PMHx significant for Antithrombin III deficiency and hx of PE for which pt is currently taking eliquis.  Pt states menses can be very painful.  Pt states known hx of endometriosis.  Pt had RSO 12/2021 due to chronic pelvic pain.  Pt noted to have extensive bowel adhesions and incidental enterotomy was repaired by General Surgery.  Pt c/o pelvic pressure when having BM's and urinating.    Pelvic sono 12/2024:  The uterus measures 6.7 x 4.1 x 6.5 cm (94 mL) and appears retroverted.     Within the uterus are multiple heterogenous masses, consistent with uterine fibroids, the largest of which measures 1.3 x 1.4 x 1.4 cm.     The cervix is within normal limits.     The endometrium measures 6.7 mm.     The right ovary is not visualized.     The left ovary measures 3.2 x 1.4 x 1.3 cm (3.2 mL). Doppler flow is present. An anechoic circumscribed structure is seen within the left ovary, indicative of a dominant follicle.     No adnexal masses are seen.       Vitals:    02/28/25 0929   Weight: 53.8 kg (118 lb 11.5 oz)     Physical Exam:   Constitutional: She is oriented to person, place, and time. She appears well-developed and well-nourished. No distress.    HENT:   Head: Normocephalic and atraumatic.     Neck: No thyromegaly present.     Pulmonary/Chest: Effort normal. No respiratory distress.        Abdominal: Soft. She exhibits no distension.             Musculoskeletal: Normal range of motion and moves all extremeties. No tenderness.       Neurological: She is alert and oriented to person, place, and time. No cranial nerve deficit. Coordination normal.    Skin: She is not diaphoretic.    Psychiatric: She has a normal mood and affect. Her behavior is " "normal. Judgment and thought content normal.     30 minute discussion  Discussed reluctance for any surgery at this time since pt's sx's seem mild.  Discussed trying myfembree for endometriosis sx's; however, pt deisres to continue current OCP instead.  Discussed myomectomy would not be practical because it is unlikely all fibroids could be removed ue to their very small size.  Also discussed that based on ultrasound sound, uterus is actually smaller than normal.  I believe her bowel sx's are more likely to be related to adhesions and endometriosis rather than a "mass effect" of the uterus.    Assessment/Plan  1. Endometriosis    2. Chronic pelvic pain in female    3. Dyschezia      Pt will maintain status quo for now.  She does not desire surgery of any kind at this time.  She will continue with OCP's and declines changing to myfembree.  Pt will f/u prn.      "

## 2025-05-03 ENCOUNTER — HOSPITAL ENCOUNTER (EMERGENCY)
Facility: HOSPITAL | Age: 45
Discharge: HOME | End: 2025-05-04
Attending: STUDENT IN AN ORGANIZED HEALTH CARE EDUCATION/TRAINING PROGRAM
Payer: MEDICAID

## 2025-05-03 DIAGNOSIS — R10.9 FLANK PAIN: ICD-10-CM

## 2025-05-03 DIAGNOSIS — R11.2 NAUSEA AND VOMITING, UNSPECIFIED VOMITING TYPE: ICD-10-CM

## 2025-05-03 DIAGNOSIS — R10.33 PERIUMBILICAL ABDOMINAL PAIN: Primary | ICD-10-CM

## 2025-05-03 LAB
ABSOLUTE EOSINOPHIL (SMH): 0.03 K/UL
ABSOLUTE MONOCYTE (SMH): 0.73 K/UL (ref 0.3–1)
ABSOLUTE NEUTROPHIL COUNT (SMH): 3.5 K/UL (ref 1.8–7.7)
ALBUMIN SERPL-MCNC: 3.9 G/DL (ref 3.5–5.2)
ALP SERPL-CCNC: 43 UNIT/L (ref 40–150)
ALT SERPL-CCNC: 12 UNIT/L (ref 10–44)
ANION GAP (SMH): 9 MMOL/L (ref 8–16)
AST SERPL-CCNC: 19 UNIT/L (ref 11–45)
B-HCG UR QL: NEGATIVE
BASOPHILS # BLD AUTO: 0.03 K/UL
BASOPHILS NFR BLD AUTO: 0.5 %
BILIRUB SERPL-MCNC: 0.2 MG/DL (ref 0.1–1)
BILIRUB UR QL STRIP.AUTO: NEGATIVE
BUN SERPL-MCNC: 11 MG/DL (ref 6–20)
CALCIUM SERPL-MCNC: 8.7 MG/DL (ref 8.7–10.5)
CHLORIDE SERPL-SCNC: 108 MMOL/L (ref 95–110)
CLARITY UR: CLEAR
CO2 SERPL-SCNC: 19 MMOL/L (ref 23–29)
COLOR UR AUTO: YELLOW
CREAT SERPL-MCNC: 1 MG/DL (ref 0.5–1.4)
CTP QC/QA: YES
ERYTHROCYTE [DISTWIDTH] IN BLOOD BY AUTOMATED COUNT: 14.7 % (ref 11.5–14.5)
GFR SERPLBLD CREATININE-BSD FMLA CKD-EPI: >60 ML/MIN/1.73/M2
GLUCOSE SERPL-MCNC: 93 MG/DL (ref 70–110)
GLUCOSE UR QL STRIP: NEGATIVE
HCT VFR BLD AUTO: 39.8 % (ref 37–48.5)
HGB BLD-MCNC: 13.6 GM/DL (ref 12–16)
HGB UR QL STRIP: NEGATIVE
IMM GRANULOCYTES # BLD AUTO: 0.01 K/UL (ref 0–0.04)
IMM GRANULOCYTES NFR BLD AUTO: 0.2 % (ref 0–0.5)
KETONES UR QL STRIP: NEGATIVE
LEUKOCYTE ESTERASE UR QL STRIP: NEGATIVE
LIPASE SERPL-CCNC: 62 U/L (ref 4–60)
LYMPHOCYTES # BLD AUTO: 2.23 K/UL (ref 1–4.8)
MCH RBC QN AUTO: 29.4 PG (ref 27–31)
MCHC RBC AUTO-ENTMCNC: 34.2 G/DL (ref 32–36)
MCV RBC AUTO: 86 FL (ref 82–98)
NITRITE UR QL STRIP: NEGATIVE
NUCLEATED RBC (/100WBC) (SMH): 0 /100 WBC
PH UR STRIP: 6 [PH]
PLATELET # BLD AUTO: 266 K/UL (ref 150–450)
PMV BLD AUTO: 8.9 FL (ref 9.2–12.9)
POTASSIUM SERPL-SCNC: 4.1 MMOL/L (ref 3.5–5.1)
PROT SERPL-MCNC: 7.4 GM/DL (ref 6–8.4)
PROT UR QL STRIP: ABNORMAL
RBC # BLD AUTO: 4.62 M/UL (ref 4–5.4)
RELATIVE EOSINOPHIL (SMH): 0.5 % (ref 0–8)
RELATIVE LYMPHOCYTE (SMH): 34.3 % (ref 18–48)
RELATIVE MONOCYTE (SMH): 11.2 % (ref 4–15)
RELATIVE NEUTROPHIL (SMH): 53.3 % (ref 38–73)
SODIUM SERPL-SCNC: 136 MMOL/L (ref 136–145)
SP GR UR STRIP: >=1.03
UROBILINOGEN UR STRIP-ACNC: NEGATIVE EU/DL
WBC # BLD AUTO: 6.5 K/UL (ref 3.9–12.7)

## 2025-05-03 PROCEDURE — 25500020 PHARM REV CODE 255

## 2025-05-03 PROCEDURE — 83690 ASSAY OF LIPASE: CPT | Performed by: STUDENT IN AN ORGANIZED HEALTH CARE EDUCATION/TRAINING PROGRAM

## 2025-05-03 PROCEDURE — 81025 URINE PREGNANCY TEST: CPT | Performed by: STUDENT IN AN ORGANIZED HEALTH CARE EDUCATION/TRAINING PROGRAM

## 2025-05-03 PROCEDURE — 99285 EMERGENCY DEPT VISIT HI MDM: CPT | Mod: 25

## 2025-05-03 PROCEDURE — 83605 ASSAY OF LACTIC ACID: CPT | Performed by: STUDENT IN AN ORGANIZED HEALTH CARE EDUCATION/TRAINING PROGRAM

## 2025-05-03 PROCEDURE — 63600175 PHARM REV CODE 636 W HCPCS: Performed by: STUDENT IN AN ORGANIZED HEALTH CARE EDUCATION/TRAINING PROGRAM

## 2025-05-03 PROCEDURE — 96374 THER/PROPH/DIAG INJ IV PUSH: CPT | Mod: 59

## 2025-05-03 PROCEDURE — 96375 TX/PRO/DX INJ NEW DRUG ADDON: CPT

## 2025-05-03 PROCEDURE — 25000003 PHARM REV CODE 250: Performed by: STUDENT IN AN ORGANIZED HEALTH CARE EDUCATION/TRAINING PROGRAM

## 2025-05-03 PROCEDURE — 80053 COMPREHEN METABOLIC PANEL: CPT | Performed by: STUDENT IN AN ORGANIZED HEALTH CARE EDUCATION/TRAINING PROGRAM

## 2025-05-03 PROCEDURE — 85025 COMPLETE CBC W/AUTO DIFF WBC: CPT | Performed by: STUDENT IN AN ORGANIZED HEALTH CARE EDUCATION/TRAINING PROGRAM

## 2025-05-03 PROCEDURE — 81003 URINALYSIS AUTO W/O SCOPE: CPT | Performed by: STUDENT IN AN ORGANIZED HEALTH CARE EDUCATION/TRAINING PROGRAM

## 2025-05-03 RX ORDER — ACETAMINOPHEN 500 MG
1000 TABLET ORAL
Status: COMPLETED | OUTPATIENT
Start: 2025-05-03 | End: 2025-05-03

## 2025-05-03 RX ORDER — FAMOTIDINE 10 MG/ML
20 INJECTION, SOLUTION INTRAVENOUS
Status: COMPLETED | OUTPATIENT
Start: 2025-05-03 | End: 2025-05-03

## 2025-05-03 RX ORDER — KETOROLAC TROMETHAMINE 30 MG/ML
15 INJECTION, SOLUTION INTRAMUSCULAR; INTRAVENOUS
Status: DISCONTINUED | OUTPATIENT
Start: 2025-05-03 | End: 2025-05-03

## 2025-05-03 RX ORDER — ONDANSETRON HYDROCHLORIDE 2 MG/ML
4 INJECTION, SOLUTION INTRAVENOUS
Status: COMPLETED | OUTPATIENT
Start: 2025-05-03 | End: 2025-05-03

## 2025-05-03 RX ADMIN — IOHEXOL 75 ML: 350 INJECTION, SOLUTION INTRAVENOUS at 10:05

## 2025-05-03 RX ADMIN — FAMOTIDINE 20 MG: 10 INJECTION, SOLUTION INTRAVENOUS at 10:05

## 2025-05-03 RX ADMIN — ONDANSETRON 4 MG: 2 INJECTION INTRAMUSCULAR; INTRAVENOUS at 10:05

## 2025-05-03 RX ADMIN — ACETAMINOPHEN 1000 MG: 500 TABLET ORAL at 10:05

## 2025-05-04 VITALS
SYSTOLIC BLOOD PRESSURE: 108 MMHG | DIASTOLIC BLOOD PRESSURE: 75 MMHG | TEMPERATURE: 98 F | HEART RATE: 71 BPM | HEIGHT: 64 IN | WEIGHT: 113 LBS | OXYGEN SATURATION: 100 % | BODY MASS INDEX: 19.29 KG/M2 | RESPIRATION RATE: 18 BRPM

## 2025-05-04 LAB — LACTATE SERPL-SCNC: 1.2 MMOL/L (ref 0.5–2.2)

## 2025-05-04 RX ORDER — POLYETHYLENE GLYCOL 3350 17 G/17G
17 POWDER, FOR SOLUTION ORAL DAILY
Qty: 850 G | Refills: 0 | Status: SHIPPED | OUTPATIENT
Start: 2025-05-04 | End: 2025-06-03

## 2025-05-04 RX ORDER — SENNOSIDES 8.6 MG/1
1 TABLET ORAL DAILY
Qty: 30 TABLET | Refills: 0 | Status: SHIPPED | OUTPATIENT
Start: 2025-05-04 | End: 2025-06-03

## 2025-05-04 NOTE — ED PROVIDER NOTES
Encounter Date: 5/3/2025       History     Chief Complaint   Patient presents with    Abdominal Pain     Mid abd pain with n/v    Dysuria     Hematuria and bilateral flank pain     HPI  45-year-old woman with a history of bipolar disorder, multiple abdominal surgeries presents for evaluation abdominal pain described as periumbilical, crampy, intermittent over the last 2-3 days.  She has not had a good bowel movement over that time.  She still has been passing gas.  She has 1 day of bilateral flank pain and hematuria.  Denies fever or chills denies chest pain shortness of breath.  Reports nausea and nonbilious nonbloody vomiting.  Denies dysuria.      Review of patient's allergies indicates:   Allergen Reactions    Folic acid     Iron analogues Anaphylaxis     Infusion only. Tolerates po    Lidocaine Swelling    Nsaids (non-steroidal anti-inflammatory drug)      cannot take NSAIDs- upsets the stomach     Past Medical History:   Diagnosis Date    Anticoagulant long-term use     Antithrombin III deficiency 08/15/2017    Anxiety     B12 deficiency 08/15/2017    Bipolar disorder     Constipation     Eating disorder     Encounter for blood transfusion     Gastroparesis     MTHFR mutation 08/15/2017    Pancreatitis     PONV (postoperative nausea and vomiting)     Pulmonary embolism     Seizures     AS A CHILD    Vitamin D deficiency 08/15/2017     Past Surgical History:   Procedure Laterality Date    APPENDECTOMY      bowel obstruction      x 2 COLON RESECTIONS    LAPAROSCOPIC SALPINGO-OOPHORECTOMY Right 12/15/2021    Procedure: SALPINGO-OOPHORECTOMY, LYSIS OF ADHESIONS ;  Surgeon: Gigi Oquendo MD;  Location: [facility];  Service: OB/GYN;  Laterality: Right;    LAPAROSCOPY N/A 12/15/2021    Procedure: LAPAROSCOPY;  Surgeon: Gigi Oquendo MD;  Location: [facility];  Service: OB/GYN;  Laterality: N/A;    lysis of adhesions      LYSIS OF ADHESIONS N/A 9/28/2022    Procedure: LYSIS, ADHESIONS;  Surgeon: Peter Perez,  MD;  Location: [facility] OR;  Service: General;  Laterality: N/A;    OVARIAN CYST REMOVAL      REVISION OF SCAR N/A 9/28/2022    Procedure: LAPAROTOMY OF SCAR TISSUE REVISION;  Surgeon: Gigi Oquendo MD;  Location: [facility] OR;  Service: OB/GYN;  Laterality: N/A;    uterine polyp removal       Family History   Problem Relation Name Age of Onset    Breast cancer Mother      Breast cancer Father      Hyperlipidemia Father      Arthritis Father      Breast cancer Maternal Aunt      Cancer Maternal Grandmother          breast and lung    Colon cancer Maternal Grandmother  62    Prostate cancer Maternal Grandfather      Prostate cancer Paternal Grandfather       Social History[1]  Review of Systems   All other systems reviewed and are negative.      Physical Exam     Initial Vitals [05/03/25 2118]   BP Pulse Resp Temp SpO2   (!) 139/90 70 18 98.4 °F (36.9 °C) 100 %      MAP       --         Physical Exam    Nursing note and vitals reviewed.  Constitutional: She appears well-developed. She is not diaphoretic.   HENT:   Head: Normocephalic and atraumatic.   Eyes: Right eye exhibits no discharge. Left eye exhibits no discharge.   Neck: No tracheal deviation present.   Cardiovascular:  Normal rate, regular rhythm and intact distal pulses.           Pulmonary/Chest: Breath sounds normal. No stridor. No respiratory distress.   Abdominal: Abdomen is soft. There is no abdominal tenderness.   No CVA tenderness There is no rebound and no guarding.   Musculoskeletal:         General: No tenderness.     Neurological: She is alert and oriented to person, place, and time. She has normal strength.   Skin: Skin is warm and dry.         ED Course   Procedures  Labs Reviewed   COMPREHENSIVE METABOLIC PANEL - Abnormal       Result Value    Sodium 136      Potassium 4.1      Chloride 108      CO2 19 (*)     Glucose 93      BUN 11      Creatinine 1.0      Calcium 8.7      Protein Total 7.4      Albumin 3.9      Bilirubin Total 0.2      ALP 43       AST 19      ALT 12      Anion Gap 9      eGFR >60     LIPASE - Abnormal    Lipase Level 62 (*)    URINALYSIS, REFLEX TO URINE CULTURE - Abnormal    Color, UA Yellow      Appearance, UA Clear      Spec Grav UA >=1.030 (*)     pH, UA 6.0      Protein, UA Trace (*)     Glucose, UA Negative      Ketones, UA Negative      Blood, UA Negative      Bilirubin, UA Negative      Urobilinogen, UA Negative      Nitrites, UA Negative      Leukocyte Esterase, UA Negative     CBC WITH DIFFERENTIAL - Abnormal    WBC 6.50      RBC 4.62      Hgb 13.6      Hct 39.8      MCV 86      MCH 29.4      MCHC 34.2      RDW 14.7 (*)     Platelet Count 266      MPV 8.9 (*)     Nucleated RBC 0      Neut % 53.3      Lymph % 34.3      Mono % 11.2      Eos % 0.5      Basophil % 0.5      Imm Grans % 0.2      Neut # 3.5      Lymph # 2.23      Mono # 0.73      Eos # 0.03      Baso # 0.03      Imm Grans # 0.01     LACTIC ACID, PLASMA - Normal    Lactic Acid Level 1.2      Narrative:     Falsely low lactic acid results can be found in samples containing >=13.0 mg/dL total bilirubin and/or >=3.5 mg/dL direct bilirubin.    CBC W/ AUTO DIFFERENTIAL    Narrative:     The following orders were created for panel order CBC W/ AUTO DIFFERENTIAL.  Procedure                               Abnormality         Status                     ---------                               -----------         ------                     CBC with Differential[[id number]]       Abnormal            Final result                 Please view results for these tests on the individual orders.   POCT URINE PREGNANCY    POC Preg Test, Ur Negative       Acceptable Yes            Imaging Results              CT Abdomen Pelvis With IV Contrast NO Oral Contrast (Final result)  Result time 05/03/25 23:04:28      Final result by Feng Aldridge MD (05/03/25 23:04:28)                   Impression:      Liquid stool in the colon.    No acute finding identified in the abdomen or  pelvis.    Incidental findings discussed in the body of the report.      Electronically signed by: Feng Aldridge MD  Date:    05/03/2025  Time:    23:04               Narrative:    EXAMINATION:  CT ABDOMEN PELVIS WITH IV CONTRAST    CLINICAL HISTORY:  Bowel obstruction suspected;    TECHNIQUE:  Low dose axial images, sagittal and coronal reformations were obtained from the lung bases to the pubic symphysis following the IV administration of 75 mL of Omnipaque 350 .  Oral contrast was not given.    COMPARISON:  CT abdomen pelvis with IV contrast, 07/11/2024.    FINDINGS:  Lower Chest:    Heart and lung bases are stable and negative for acute finding.    Abdomen:    Liver is similar in size and contour.  Focal fat infiltration along the falciform ligament.  Probable subcentimeter simple appearing cyst in the right hepatic lobe.  No worrisome liver mass identified on this single phase study.  Gallbladder is unremarkable.  No intrahepatic biliary ductal dilatation.    Spleen is not enlarged.  Adrenal glands and pancreas are unremarkable.    The kidneys are symmetric.  No hydronephrosis. No asymmetric perinephric fat stranding.    Stomach is not overly distended.  Minimal fluid distension of small bowel without evidence of small-bowel obstruction.  Liquid stool in the colon.    No pneumoperitoneum or organized fluid collection.    No bulky retroperitoneal lymphadenopathy.    Abdominal aorta is normal in caliber without significant atherosclerosis.    Portal, splenic, and superior mesenteric veins are patent. No portal venous gas.    Pelvis:    Urinary bladder is decompressed and not well evaluated.  Uterus is retroverted.  Possible small uterine fibroids.  Rectum is unremarkable.  No significant pelvic free fluid.    Bones and soft tissues:    No aggressive osseous lesions.  Operative changes in the anterior abdominal wall.  Extraperitoneal soft tissues are negative for acute finding.  Subtle nodular density in the  right breast likely correlating to finding identified on prior breast ultrasound.                                       Medications   ondansetron injection 4 mg (4 mg Intravenous Given 5/3/25 2233)   famotidine (PF) injection 20 mg (20 mg Intravenous Given 5/3/25 2233)   acetaminophen tablet 1,000 mg (1,000 mg Oral Given 5/3/25 2254)   iohexoL (OMNIPAQUE 350) 350 mg iodine/mL injection (75 mLs Intravenous Given 5/3/25 2241)     Medical Decision Making  45-year-old woman with abdominal and flank pain, decreased bowel movements, vitals are reassuring, belly is soft and nontender.  Differential includes obstruction kidney stone UTI pancreatitis other intra-abdominal pathologies not excluded.  With the history I will scan her belly.  I will treat her symptomatically.    Lipase is mildly elevated, her symptoms are not consistent with pancreatitis, CT scan without acute abnormality, urine does not look infected nor does she have hematuria.  Think she is reasonable for outpatient management.  We discussed her constipation.  See ED course for discussion    Amount and/or Complexity of Data Reviewed  Labs: ordered.  Radiology: ordered and independent interpretation performed. Decision-making details documented in ED Course.    Risk  OTC drugs.  Prescription drug management.  Decision regarding hospitalization.               ED Course as of 05/04/25 0127   Sat May 03, 2025   2306 I do not appreciate obvious air-fluid levels on my independent interpretation of CT abdomen pelvis [IC]   Sun May 04, 2025   0028 Discussed results including her incidental liver findings with the patient, discussed follow up.  We will increase her MiraLax we will add senna.  Recommended she see GI again.  She has a follow up appointment for later this month.  Does not wish to have a referral placed repeat exam is reassuring, Return precautions/follow up instructions/treatment plan given, expressed agreement and understanding.    [IC]      ED Course  User Index  [IC] Kendall Bonilla MD                           Clinical Impression:  Final diagnoses:  [R10.33] Periumbilical abdominal pain (Primary)  [R10.9] Flank pain  [R11.2] Nausea and vomiting, unspecified vomiting type          ED Disposition Condition    Discharge Stable          ED Prescriptions       Medication Sig Dispense Start Date End Date Auth. Provider    SENNA 8.6 mg tablet Take 1 tablet by mouth once daily. 30 tablet 5/4/2025 6/3/2025 Kendall Bonilla MD    polyethylene glycol (MIRALAX) 17 gram/dose powder Take 17 g by mouth once daily. If you do not have a soft but formed bowel movement take another scooped the next day. 850 g 5/4/2025 6/3/2025 Kendall Bonilla MD          Follow-up Information    None            [1]   Social History  Tobacco Use    Smoking status: Never    Smokeless tobacco: Never   Substance Use Topics    Alcohol use: No    Drug use: No        Kendall Bonilla MD  05/04/25 0126

## 2025-05-04 NOTE — DISCHARGE INSTRUCTIONS
We did not see evidence of bowel obstruction on your CT scan.  It did show a small cyst in your liver.  You can follow up with primary care for this.    Take the MiraLax and senna as directed.  Follow up with the primary care doctor and your GI doctor.    Please return to this facility or another ED as needed for any new or worsening symptoms including chest pain, shortness of breath, abdominal pain, nausea or vomiting, fever or chills. Please follow up with your primary care doctor in 3 days. Please take all medicines as prescribed.

## 2025-08-02 ENCOUNTER — HOSPITAL ENCOUNTER (EMERGENCY)
Facility: HOSPITAL | Age: 45
Discharge: HOME | End: 2025-08-02
Attending: EMERGENCY MEDICINE
Payer: MEDICAID

## 2025-08-02 VITALS
BODY MASS INDEX: 19.29 KG/M2 | RESPIRATION RATE: 18 BRPM | TEMPERATURE: 98 F | HEIGHT: 64 IN | HEART RATE: 77 BPM | OXYGEN SATURATION: 99 % | SYSTOLIC BLOOD PRESSURE: 113 MMHG | WEIGHT: 113 LBS | DIASTOLIC BLOOD PRESSURE: 77 MMHG

## 2025-08-02 DIAGNOSIS — B02.9 HERPES ZOSTER WITHOUT COMPLICATION: Primary | ICD-10-CM

## 2025-08-02 DIAGNOSIS — B34.9 VIRAL ILLNESS: ICD-10-CM

## 2025-08-02 LAB
ABSOLUTE EOSINOPHIL (SMH): 0.02 K/UL
ABSOLUTE MONOCYTE (SMH): 0.53 K/UL (ref 0.3–1)
ABSOLUTE NEUTROPHIL COUNT (SMH): 3 K/UL (ref 1.8–7.7)
ALBUMIN SERPL-MCNC: 3.7 G/DL (ref 3.5–5.2)
ALP SERPL-CCNC: 42 UNIT/L (ref 40–150)
ALT SERPL-CCNC: <8 UNIT/L (ref 10–44)
ANION GAP (SMH): 6 MMOL/L (ref 8–16)
AST SERPL-CCNC: 21 UNIT/L (ref 11–45)
B-HCG UR QL: NEGATIVE
BASOPHILS # BLD AUTO: 0.02 K/UL
BASOPHILS NFR BLD AUTO: 0.4 %
BILIRUB SERPL-MCNC: 0.3 MG/DL (ref 0.1–1)
BUN SERPL-MCNC: 12 MG/DL (ref 6–20)
CALCIUM SERPL-MCNC: 8.9 MG/DL (ref 8.7–10.5)
CHLORIDE SERPL-SCNC: 111 MMOL/L (ref 95–110)
CK SERPL-CCNC: 47 U/L (ref 20–180)
CO2 SERPL-SCNC: 20 MMOL/L (ref 23–29)
CREAT SERPL-MCNC: 0.9 MG/DL (ref 0.5–1.4)
CTP QC/QA: YES
ERYTHROCYTE [DISTWIDTH] IN BLOOD BY AUTOMATED COUNT: 14.9 % (ref 11.5–14.5)
GFR SERPLBLD CREATININE-BSD FMLA CKD-EPI: >60 ML/MIN/1.73/M2
GLUCOSE SERPL-MCNC: 103 MG/DL (ref 70–110)
HCT VFR BLD AUTO: 37.9 % (ref 37–48.5)
HGB BLD-MCNC: 12.7 GM/DL (ref 12–16)
IMM GRANULOCYTES # BLD AUTO: 0.01 K/UL (ref 0–0.04)
IMM GRANULOCYTES NFR BLD AUTO: 0.2 % (ref 0–0.5)
INFLUENZA A MOLECULAR (OHS): NEGATIVE
INFLUENZA B MOLECULAR (OHS): NEGATIVE
LYMPHOCYTES # BLD AUTO: 1.71 K/UL (ref 1–4.8)
MAGNESIUM SERPL-MCNC: 2.1 MG/DL (ref 1.6–2.6)
MCH RBC QN AUTO: 29.8 PG (ref 27–31)
MCHC RBC AUTO-ENTMCNC: 33.5 G/DL (ref 32–36)
MCV RBC AUTO: 89 FL (ref 82–98)
NUCLEATED RBC (/100WBC) (SMH): 0 /100 WBC
PLATELET # BLD AUTO: 228 K/UL (ref 150–450)
PMV BLD AUTO: 9.1 FL (ref 9.2–12.9)
POTASSIUM SERPL-SCNC: 4.1 MMOL/L (ref 3.5–5.1)
PROT SERPL-MCNC: 6.7 GM/DL (ref 6–8.4)
RBC # BLD AUTO: 4.26 M/UL (ref 4–5.4)
RELATIVE EOSINOPHIL (SMH): 0.4 % (ref 0–8)
RELATIVE LYMPHOCYTE (SMH): 32.6 % (ref 18–48)
RELATIVE MONOCYTE (SMH): 10.1 % (ref 4–15)
RELATIVE NEUTROPHIL (SMH): 56.3 % (ref 38–73)
SARS-COV-2 RDRP RESP QL NAA+PROBE: NEGATIVE
SODIUM SERPL-SCNC: 137 MMOL/L (ref 136–145)
WBC # BLD AUTO: 5.25 K/UL (ref 3.9–12.7)

## 2025-08-02 PROCEDURE — 87502 INFLUENZA DNA AMP PROBE: CPT | Performed by: NURSE PRACTITIONER

## 2025-08-02 PROCEDURE — 80053 COMPREHEN METABOLIC PANEL: CPT | Performed by: NURSE PRACTITIONER

## 2025-08-02 PROCEDURE — U0002 COVID-19 LAB TEST NON-CDC: HCPCS | Performed by: NURSE PRACTITIONER

## 2025-08-02 PROCEDURE — 81025 URINE PREGNANCY TEST: CPT | Performed by: NURSE PRACTITIONER

## 2025-08-02 PROCEDURE — 82550 ASSAY OF CK (CPK): CPT | Performed by: NURSE PRACTITIONER

## 2025-08-02 PROCEDURE — 25000003 PHARM REV CODE 250: Performed by: NURSE PRACTITIONER

## 2025-08-02 PROCEDURE — 99284 EMERGENCY DEPT VISIT MOD MDM: CPT | Mod: 25

## 2025-08-02 PROCEDURE — 83735 ASSAY OF MAGNESIUM: CPT | Performed by: NURSE PRACTITIONER

## 2025-08-02 PROCEDURE — 85025 COMPLETE CBC W/AUTO DIFF WBC: CPT | Performed by: NURSE PRACTITIONER

## 2025-08-02 PROCEDURE — 96360 HYDRATION IV INFUSION INIT: CPT

## 2025-08-02 PROCEDURE — 36415 COLL VENOUS BLD VENIPUNCTURE: CPT | Performed by: NURSE PRACTITIONER

## 2025-08-02 RX ORDER — MUPIROCIN 20 MG/G
OINTMENT TOPICAL 2 TIMES DAILY
Qty: 30 G | Refills: 0 | Status: SHIPPED | OUTPATIENT
Start: 2025-08-02 | End: 2025-08-12

## 2025-08-02 RX ORDER — VALACYCLOVIR HYDROCHLORIDE 1 G/1
1000 TABLET, FILM COATED ORAL 3 TIMES DAILY
Qty: 21 TABLET | Refills: 0 | Status: SHIPPED | OUTPATIENT
Start: 2025-08-02 | End: 2025-08-09

## 2025-08-02 RX ORDER — SODIUM CHLORIDE 9 MG/ML
1000 INJECTION, SOLUTION INTRAVENOUS
Status: COMPLETED | OUTPATIENT
Start: 2025-08-02 | End: 2025-08-02

## 2025-08-02 RX ADMIN — SODIUM CHLORIDE 1000 ML: 9 INJECTION, SOLUTION INTRAVENOUS at 02:08

## 2025-08-03 NOTE — ED PROVIDER NOTES
Encounter Date: 8/2/2025       History     Chief Complaint   Patient presents with    Generalized Body Aches    Weakness     X 4 days , itching  , feet numbness  , pustules mouth      Patient is a 45 y.o. female who presents to the ED 08/02/2025 with a chief complaint of Itching in her hands and feet a couple of days ago.  Patient states she then felt more tired than usual like maybe she was coming down with a cold.  She states over the last day she has noticed she has had some blisters in her nose and on her lip and she put some antibiotic cream on it but it is not helping.  She states it feels different and tingly similar to shingles that she has had in the past.  She states it is kind of painful.  She denies any chest pain, shortness of breath, nausea, vomiting, measured fever, dysuria, abdominal pain, or other symptoms.  She denies pregnancy.  Other past medical history noted below.             Review of patient's allergies indicates:   Allergen Reactions    Folic acid     Iron analogues Anaphylaxis     Infusion only. Tolerates po    Lidocaine Swelling    Nsaids (non-steroidal anti-inflammatory drug)      cannot take NSAIDs- upsets the stomach     Past Medical History:   Diagnosis Date    Anticoagulant long-term use     Antithrombin III deficiency 08/15/2017    Anxiety     B12 deficiency 08/15/2017    Bipolar disorder     Constipation     Eating disorder     Encounter for blood transfusion     Gastroparesis     MTHFR mutation 08/15/2017    Pancreatitis     PONV (postoperative nausea and vomiting)     Pulmonary embolism     Seizures     AS A CHILD    Vitamin D deficiency 08/15/2017     Past Surgical History:   Procedure Laterality Date    APPENDECTOMY      bowel obstruction      x 2 COLON RESECTIONS    LAPAROSCOPIC SALPINGO-OOPHORECTOMY Right 12/15/2021    Procedure: SALPINGO-OOPHORECTOMY, LYSIS OF ADHESIONS ;  Surgeon: Gigi Oquendo MD;  Location: [facility];  Service: OB/GYN;  Laterality: Right;    LAPAROSCOPY  N/A 12/15/2021    Procedure: LAPAROSCOPY;  Surgeon: Gigi Oquendo MD;  Location: [facility];  Service: OB/GYN;  Laterality: N/A;    lysis of adhesions      LYSIS OF ADHESIONS N/A 9/28/2022    Procedure: LYSIS, ADHESIONS;  Surgeon: Peter Perez MD;  Location: [facility] OR;  Service: General;  Laterality: N/A;    OVARIAN CYST REMOVAL      REVISION OF SCAR N/A 9/28/2022    Procedure: LAPAROTOMY OF SCAR TISSUE REVISION;  Surgeon: Gigi Oquendo MD;  Location: [facility] OR;  Service: OB/GYN;  Laterality: N/A;    uterine polyp removal       Family History   Problem Relation Name Age of Onset    Breast cancer Mother      Breast cancer Father      Hyperlipidemia Father      Arthritis Father      Breast cancer Maternal Aunt      Cancer Maternal Grandmother          breast and lung    Colon cancer Maternal Grandmother  62    Prostate cancer Maternal Grandfather      Prostate cancer Paternal Grandfather       Social History[1]  Review of Systems   Constitutional:  Negative for chills and fever.   HENT:  Negative for sore throat.    Respiratory:  Negative for chest tightness and shortness of breath.    Cardiovascular:  Negative for chest pain.   Gastrointestinal:  Negative for abdominal pain.   Genitourinary:  Negative for dysuria.   Musculoskeletal:  Positive for myalgias. Negative for arthralgias, back pain and gait problem.   Skin:  Positive for rash. Negative for wound.   Neurological:  Negative for syncope.   Hematological:  Does not bruise/bleed easily.       Physical Exam     Initial Vitals [08/02/25 1319]   BP Pulse Resp Temp SpO2   113/77 77 18 98 °F (36.7 °C) 99 %      MAP       --         Physical Exam    Nursing note and vitals reviewed.  Constitutional: Vital signs are normal. She appears well-developed and well-nourished.   HENT:   Head: Normocephalic and atraumatic.   Nose: Sinus tenderness present. No mucosal edema, rhinorrhea, nose lacerations, nasal deformity, septal deviation or nasal septal hematoma. No  epistaxis.  No foreign bodies. Right sinus exhibits no maxillary sinus tenderness and no frontal sinus tenderness. Left sinus exhibits no maxillary sinus tenderness and no frontal sinus tenderness. Mouth/Throat: Uvula is midline, oropharynx is clear and moist and mucous membranes are normal. She does not have dentures. Oral lesions present. No trismus in the jaw. Normal dentition. No dental abscesses, uvula swelling, lacerations or dental caries.   To left intranasal small pustules.  Right nare with small pustule along the septum.  Similar appearing pustule on the upper lip.  Clear serous drainage with some crusting.   Eyes: Pupils are equal, round, and reactive to light.   Neck: Neck supple.   Cardiovascular:  Normal rate, regular rhythm, normal heart sounds and intact distal pulses.     Exam reveals no gallop and no friction rub.       No murmur heard.  Pulmonary/Chest: Breath sounds normal. She has no wheezes. She has no rhonchi. She has no rales.   Abdominal: Abdomen is soft. Bowel sounds are normal. There is no abdominal tenderness.   Musculoskeletal:      Cervical back: Neck supple.     Neurological: She is alert and oriented to person, place, and time. She has normal strength.   Skin: Skin is warm, dry and intact.   Psychiatric: She has a normal mood and affect. Her speech is normal and behavior is normal.         ED Course   Procedures  Labs Reviewed   COMPREHENSIVE METABOLIC PANEL - Abnormal       Result Value    Sodium 137      Potassium 4.1      Chloride 111 (*)     CO2 20 (*)     Glucose 103      BUN 12      Creatinine 0.9      Calcium 8.9      Protein Total 6.7      Albumin 3.7      Bilirubin Total 0.3      ALP 42      AST 21      ALT <8 (*)     Anion Gap 6 (*)     eGFR >60     CBC WITH DIFFERENTIAL - Abnormal    WBC 5.25      RBC 4.26      Hgb 12.7      Hct 37.9      MCV 89      MCH 29.8      MCHC 33.5      RDW 14.9 (*)     Platelet Count 228      MPV 9.1 (*)     Nucleated RBC 0      Neut % 56.3       Lymph % 32.6      Mono % 10.1      Eos % 0.4      Basophil % 0.4      Imm Grans % 0.2      Neut # 3.0      Lymph # 1.71      Mono # 0.53      Eos # 0.02      Baso # 0.02      Imm Grans # 0.01     INFLUENZA A & B BY MOLECULAR - Normal    INFLUENZA A MOLECULAR Negative      INFLUENZA B MOLECULAR  Negative     SARS-COV-2 RNA AMPLIFICATION, QUAL - Normal    SARS COV-2 Molecular Negative     MAGNESIUM - Normal    Magnesium 2.1     CK - Normal    CPK 47     CBC W/ AUTO DIFFERENTIAL    Narrative:     The following orders were created for panel order CBC auto differential.  Procedure                               Abnormality         Status                     ---------                               -----------         ------                     CBC with Differential[[id number]]       Abnormal            Final result                 Please view results for these tests on the individual orders.   POCT URINE PREGNANCY    POC Preg Test, Ur Negative       Acceptable Yes            Imaging Results    None          Medications   0.9% NaCl infusion (0 mLs Intravenous Stopped 8/2/25 1555)     Medical Decision Making  Amount and/or Complexity of Data Reviewed  Labs: ordered.    Risk  Prescription drug management.         APC / Resident Notes:   Patient is a 45 y.o. female who presents to the ED 08/02/2025 who underwent emergent evaluation for myalgias and generalized weakness.  Vital signs normal.  Patient well-appearing.  No objective weakness on exam.  No rash of the palms of the hands or soles of the feet.  She denies any chest pain or shortness of breath.  She is not febrile or septic or toxic.  No leukocytosis.  No severe electrolyte derangements.  No signs of end-organ dysfunction.  CPK normal.  I do not think rhabdomyolysis.  Viral testing negative in the ED. discussed possible hand-foot-mouth disease or other viral illness.  Patient also has small pustules to nose and upper lip.  Patient states they are more  painful and concerning for shingles and she is given antivirals.  Discussed possible impetigo or bacterial infection and she is given topical antibiotic cream as well.  No drainable abscess.  No significant facial swelling or deep space infection noted.  No intraoral lesions. Based on my clinical evaluation, I do not appreciate any immediate, emergent, or life threatening condition or etiology that warrants additional workup today and feel that the patient can be discharged with close follow up care. Case discussed with Dr. Forde who is agreeable to plan of care. Follow up and return precautions discussed; patient verbalized understanding and is agreeable to plan of care. Patient discharged home in stable condition.                Attending Attestation:     Physician Attestation Statement for NP/PA:       Other NP/PA Attestation Additions:    History of Present Illness: 45-year-old female presented with multiple complaints.    Medical Decision Making: Initial differential diagnosis included but not limited to viral illness, dermatitis, and anxiety.  I am in agreement with the nurse practitioner's assessment, treatment, and plan of care.                         Medical Decision Making:   Differential Diagnosis:     Impetigo  Nasal polyp  Hand-foot-mouth disease  Viral illness  Shingles                Clinical Impression:  Final diagnoses:  [B02.9] Herpes zoster without complication (Primary)  [B34.9] Viral illness          ED Disposition Condition    Discharge Stable          ED Prescriptions       Medication Sig Dispense Start Date End Date Auth. Provider    mupirocin (BACTROBAN) 2 % ointment Apply topically 2 (two) times daily. for 10 days 30 g 8/2/2025 8/12/2025 Yady Rivas NP    valACYclovir (VALTREX) 1000 MG tablet Take 1 tablet (1,000 mg total) by mouth 3 (three) times daily. for 7 days 21 tablet 8/2/2025 8/9/2025 Yady Rivas NP          Follow-up Information       Follow up With Specialties Details Why  Contact Info Additional Information    Ck Savage MD Internal Medicine In 2 days  [address] [facility]  [place] [address]  [phone]       [facility] -  Emergency Medicine  As needed, If symptoms worsen [address] [place] [address] [address] [place] [address] 1st floor                 Yady Rivas NP  08/03/25 0911         [1]   Social History  Tobacco Use    Smoking status: Never    Smokeless tobacco: Never   Substance Use Topics    Alcohol use: No    Drug use: No        Jesus Forde MD  08/03/25 8381

## 2025-08-12 ENCOUNTER — TELEPHONE (OUTPATIENT)
Facility: CLINIC | Age: 45
End: 2025-08-12
Payer: MEDICAID

## 2025-08-12 DIAGNOSIS — I26.99 OTHER PULMONARY EMBOLISM WITHOUT ACUTE COR PULMONALE, UNSPECIFIED CHRONICITY: ICD-10-CM

## 2025-08-12 DIAGNOSIS — D68.59 ANTITHROMBIN III DEFICIENCY: Primary | ICD-10-CM

## 2025-08-29 ENCOUNTER — HOSPITAL ENCOUNTER (OUTPATIENT)
Facility: HOSPITAL | Age: 45
Discharge: HOME | End: 2025-08-31
Attending: STUDENT IN AN ORGANIZED HEALTH CARE EDUCATION/TRAINING PROGRAM | Admitting: STUDENT IN AN ORGANIZED HEALTH CARE EDUCATION/TRAINING PROGRAM
Payer: MEDICAID

## 2025-08-29 DIAGNOSIS — R51.9 NONINTRACTABLE HEADACHE, UNSPECIFIED CHRONICITY PATTERN, UNSPECIFIED HEADACHE TYPE: ICD-10-CM

## 2025-08-29 DIAGNOSIS — R00.2 PALPITATIONS: ICD-10-CM

## 2025-08-29 DIAGNOSIS — R10.9 INTRACTABLE ABDOMINAL PAIN: Primary | ICD-10-CM

## 2025-08-29 DIAGNOSIS — R11.2 NAUSEA AND VOMITING, UNSPECIFIED VOMITING TYPE: ICD-10-CM

## 2025-08-29 DIAGNOSIS — R07.9 CHEST PAIN: ICD-10-CM

## 2025-08-29 DIAGNOSIS — R10.30 LOWER ABDOMINAL PAIN: ICD-10-CM

## 2025-08-29 LAB
ABSOLUTE EOSINOPHIL (SMH): 0.06 K/UL
ABSOLUTE MONOCYTE (SMH): 0.53 K/UL (ref 0.3–1)
ABSOLUTE NEUTROPHIL COUNT (SMH): 2 K/UL (ref 1.8–7.7)
ALBUMIN SERPL-MCNC: 4 G/DL (ref 3.5–5.2)
ALP SERPL-CCNC: 54 UNIT/L (ref 40–150)
ALT SERPL-CCNC: 11 UNIT/L (ref 10–44)
ANION GAP (SMH): 10 MMOL/L (ref 8–16)
AST SERPL-CCNC: 24 UNIT/L (ref 11–45)
B-HCG UR QL: NEGATIVE
BASOPHILS # BLD AUTO: 0.06 K/UL
BASOPHILS NFR BLD AUTO: 1.1 %
BILIRUB SERPL-MCNC: 0.2 MG/DL (ref 0.1–1)
BUN SERPL-MCNC: 11 MG/DL (ref 6–20)
CALCIUM SERPL-MCNC: 8.9 MG/DL (ref 8.7–10.5)
CHLORIDE SERPL-SCNC: 112 MMOL/L (ref 95–110)
CO2 SERPL-SCNC: 18 MMOL/L (ref 23–29)
CREAT SERPL-MCNC: 0.9 MG/DL (ref 0.5–1.4)
CTP QC/QA: YES
ERYTHROCYTE [DISTWIDTH] IN BLOOD BY AUTOMATED COUNT: 15.6 % (ref 11.5–14.5)
FLUAV AG UPPER RESP QL IA.RAPID: NEGATIVE
FLUBV AG UPPER RESP QL IA.RAPID: NEGATIVE
GFR SERPLBLD CREATININE-BSD FMLA CKD-EPI: >60 ML/MIN/1.73/M2
GLUCOSE SERPL-MCNC: 123 MG/DL (ref 70–110)
HCT VFR BLD AUTO: 42.5 % (ref 37–48.5)
HCV AB SERPL QL IA: NORMAL
HGB BLD-MCNC: 15 GM/DL (ref 12–16)
HIV 1+2 AB+HIV1 P24 AG SERPL QL IA: NORMAL
IMM GRANULOCYTES # BLD AUTO: 0.01 K/UL (ref 0–0.04)
IMM GRANULOCYTES NFR BLD AUTO: 0.2 % (ref 0–0.5)
LIPASE SERPL-CCNC: 95 U/L (ref 4–60)
LYMPHOCYTES # BLD AUTO: 3.01 K/UL (ref 1–4.8)
MCH RBC QN AUTO: 30.9 PG (ref 27–31)
MCHC RBC AUTO-ENTMCNC: 35.3 G/DL (ref 32–36)
MCV RBC AUTO: 88 FL (ref 82–98)
NT-PROBNP SERPL-MCNC: 33 PG/ML
NUCLEATED RBC (/100WBC) (SMH): 0 /100 WBC
PLATELET # BLD AUTO: 280 K/UL (ref 150–450)
PMV BLD AUTO: 9.5 FL (ref 9.2–12.9)
POTASSIUM SERPL-SCNC: 3.2 MMOL/L (ref 3.5–5.1)
PROT SERPL-MCNC: 7.5 GM/DL (ref 6–8.4)
RBC # BLD AUTO: 4.85 M/UL (ref 4–5.4)
RELATIVE EOSINOPHIL (SMH): 1.1 % (ref 0–8)
RELATIVE LYMPHOCYTE (SMH): 53.4 % (ref 18–48)
RELATIVE MONOCYTE (SMH): 9.4 % (ref 4–15)
RELATIVE NEUTROPHIL (SMH): 34.8 % (ref 38–73)
SARS-COV-2 RDRP RESP QL NAA+PROBE: NEGATIVE
SODIUM SERPL-SCNC: 140 MMOL/L (ref 136–145)
TROPONIN I SERPL HS-MCNC: <3 NG/L
WBC # BLD AUTO: 5.64 K/UL (ref 3.9–12.7)

## 2025-08-29 PROCEDURE — 80053 COMPREHEN METABOLIC PANEL: CPT | Performed by: STUDENT IN AN ORGANIZED HEALTH CARE EDUCATION/TRAINING PROGRAM

## 2025-08-29 PROCEDURE — 93005 ELECTROCARDIOGRAM TRACING: CPT

## 2025-08-29 PROCEDURE — 25500020 PHARM REV CODE 255

## 2025-08-29 PROCEDURE — 83880 ASSAY OF NATRIURETIC PEPTIDE: CPT | Performed by: STUDENT IN AN ORGANIZED HEALTH CARE EDUCATION/TRAINING PROGRAM

## 2025-08-29 PROCEDURE — 63600175 PHARM REV CODE 636 W HCPCS: Performed by: STUDENT IN AN ORGANIZED HEALTH CARE EDUCATION/TRAINING PROGRAM

## 2025-08-29 PROCEDURE — U0002 COVID-19 LAB TEST NON-CDC: HCPCS | Performed by: STUDENT IN AN ORGANIZED HEALTH CARE EDUCATION/TRAINING PROGRAM

## 2025-08-29 PROCEDURE — 81025 URINE PREGNANCY TEST: CPT | Performed by: STUDENT IN AN ORGANIZED HEALTH CARE EDUCATION/TRAINING PROGRAM

## 2025-08-29 PROCEDURE — 25000003 PHARM REV CODE 250: Performed by: STUDENT IN AN ORGANIZED HEALTH CARE EDUCATION/TRAINING PROGRAM

## 2025-08-29 PROCEDURE — 63600175 PHARM REV CODE 636 W HCPCS

## 2025-08-29 PROCEDURE — 84484 ASSAY OF TROPONIN QUANT: CPT | Performed by: STUDENT IN AN ORGANIZED HEALTH CARE EDUCATION/TRAINING PROGRAM

## 2025-08-29 PROCEDURE — 85025 COMPLETE CBC W/AUTO DIFF WBC: CPT | Performed by: STUDENT IN AN ORGANIZED HEALTH CARE EDUCATION/TRAINING PROGRAM

## 2025-08-29 PROCEDURE — 87502 INFLUENZA DNA AMP PROBE: CPT | Performed by: STUDENT IN AN ORGANIZED HEALTH CARE EDUCATION/TRAINING PROGRAM

## 2025-08-29 PROCEDURE — 87389 HIV-1 AG W/HIV-1&-2 AB AG IA: CPT | Performed by: STUDENT IN AN ORGANIZED HEALTH CARE EDUCATION/TRAINING PROGRAM

## 2025-08-29 PROCEDURE — 86803 HEPATITIS C AB TEST: CPT | Performed by: STUDENT IN AN ORGANIZED HEALTH CARE EDUCATION/TRAINING PROGRAM

## 2025-08-29 PROCEDURE — 83690 ASSAY OF LIPASE: CPT | Performed by: STUDENT IN AN ORGANIZED HEALTH CARE EDUCATION/TRAINING PROGRAM

## 2025-08-29 PROCEDURE — 94760 N-INVAS EAR/PLS OXIMETRY 1: CPT

## 2025-08-29 PROCEDURE — 93010 ELECTROCARDIOGRAM REPORT: CPT | Mod: ,,, | Performed by: GENERAL PRACTICE

## 2025-08-29 RX ORDER — DIPHENHYDRAMINE HYDROCHLORIDE 50 MG/ML
25 INJECTION, SOLUTION INTRAMUSCULAR; INTRAVENOUS
Status: COMPLETED | OUTPATIENT
Start: 2025-08-29 | End: 2025-08-29

## 2025-08-29 RX ORDER — ONDANSETRON HYDROCHLORIDE 2 MG/ML
INJECTION, SOLUTION INTRAVENOUS
Status: COMPLETED
Start: 2025-08-29 | End: 2025-08-29

## 2025-08-29 RX ORDER — ONDANSETRON HYDROCHLORIDE 2 MG/ML
4 INJECTION, SOLUTION INTRAVENOUS
Status: DISCONTINUED | OUTPATIENT
Start: 2025-08-29 | End: 2025-08-29

## 2025-08-29 RX ORDER — ONDANSETRON HYDROCHLORIDE 2 MG/ML
4 INJECTION, SOLUTION INTRAVENOUS
Status: COMPLETED | OUTPATIENT
Start: 2025-08-29 | End: 2025-08-29

## 2025-08-29 RX ORDER — METOCLOPRAMIDE HYDROCHLORIDE 5 MG/ML
10 INJECTION INTRAMUSCULAR; INTRAVENOUS
Status: COMPLETED | OUTPATIENT
Start: 2025-08-29 | End: 2025-08-29

## 2025-08-29 RX ORDER — FAMOTIDINE 10 MG/ML
20 INJECTION, SOLUTION INTRAVENOUS
Status: COMPLETED | OUTPATIENT
Start: 2025-08-29 | End: 2025-08-29

## 2025-08-29 RX ADMIN — ONDANSETRON HYDROCHLORIDE 4 MG: 2 INJECTION, SOLUTION INTRAVENOUS at 09:08

## 2025-08-29 RX ADMIN — FAMOTIDINE 20 MG: 10 INJECTION, SOLUTION INTRAVENOUS at 08:08

## 2025-08-29 RX ADMIN — DIPHENHYDRAMINE HYDROCHLORIDE 25 MG: 50 INJECTION INTRAMUSCULAR; INTRAVENOUS at 08:08

## 2025-08-29 RX ADMIN — SODIUM CHLORIDE 1000 ML: 9 INJECTION, SOLUTION INTRAVENOUS at 08:08

## 2025-08-29 RX ADMIN — ONDANSETRON 4 MG: 2 INJECTION INTRAMUSCULAR; INTRAVENOUS at 09:08

## 2025-08-29 RX ADMIN — IOHEXOL 75 ML: 350 INJECTION, SOLUTION INTRAVENOUS at 08:08

## 2025-08-29 RX ADMIN — METOCLOPRAMIDE 10 MG: 5 INJECTION, SOLUTION INTRAMUSCULAR; INTRAVENOUS at 08:08

## 2025-08-30 PROBLEM — R10.9 INTRACTABLE ABDOMINAL PAIN: Status: ACTIVE | Noted: 2025-08-30

## 2025-08-30 PROBLEM — G40.909 NONINTRACTABLE EPILEPSY WITHOUT STATUS EPILEPTICUS: Status: ACTIVE | Noted: 2025-08-30

## 2025-08-30 PROBLEM — R01.1 UNDIAGNOSED CARDIAC MURMURS: Status: ACTIVE | Noted: 2025-08-30

## 2025-08-30 PROBLEM — E87.6 HYPOKALEMIA: Status: ACTIVE | Noted: 2025-08-30

## 2025-08-30 PROBLEM — K21.9 GASTROESOPHAGEAL REFLUX DISEASE: Status: ACTIVE | Noted: 2023-09-27

## 2025-08-30 LAB
ABSOLUTE EOSINOPHIL (SMH): 0.05 K/UL
ABSOLUTE MONOCYTE (SMH): 0.51 K/UL (ref 0.3–1)
ABSOLUTE NEUTROPHIL COUNT (SMH): 1.6 K/UL (ref 1.8–7.7)
ALBUMIN SERPL-MCNC: 2.9 G/DL (ref 3.5–5.2)
ALP SERPL-CCNC: 42 UNIT/L (ref 40–150)
ALT SERPL-CCNC: <8 UNIT/L (ref 10–44)
ANION GAP (SMH): 5 MMOL/L (ref 8–16)
AST SERPL-CCNC: 17 UNIT/L (ref 11–45)
BACTERIA #/AREA URNS AUTO: ABNORMAL /HPF
BASOPHILS # BLD AUTO: 0.04 K/UL
BASOPHILS NFR BLD AUTO: 0.8 %
BILIRUB SERPL-MCNC: 0.4 MG/DL (ref 0.1–1)
BILIRUB UR QL STRIP.AUTO: NEGATIVE
BUN SERPL-MCNC: 9 MG/DL (ref 6–20)
CALCIUM SERPL-MCNC: 7.7 MG/DL (ref 8.7–10.5)
CHLORIDE SERPL-SCNC: 115 MMOL/L (ref 95–110)
CLARITY UR: CLEAR
CO2 SERPL-SCNC: 21 MMOL/L (ref 23–29)
COLOR UR AUTO: YELLOW
CREAT SERPL-MCNC: 0.7 MG/DL (ref 0.5–1.4)
ERYTHROCYTE [DISTWIDTH] IN BLOOD BY AUTOMATED COUNT: 15.7 % (ref 11.5–14.5)
GFR SERPLBLD CREATININE-BSD FMLA CKD-EPI: >60 ML/MIN/1.73/M2
GLUCOSE SERPL-MCNC: 90 MG/DL (ref 70–110)
GLUCOSE UR QL STRIP: NEGATIVE
HCT VFR BLD AUTO: 34.2 % (ref 37–48.5)
HGB BLD-MCNC: 11.8 GM/DL (ref 12–16)
HGB UR QL STRIP: NEGATIVE
IMM GRANULOCYTES # BLD AUTO: 0.01 K/UL (ref 0–0.04)
IMM GRANULOCYTES NFR BLD AUTO: 0.2 % (ref 0–0.5)
KETONES UR QL STRIP: NEGATIVE
LEUKOCYTE ESTERASE UR QL STRIP: NEGATIVE
LYMPHOCYTES # BLD AUTO: 2.9 K/UL (ref 1–4.8)
MAGNESIUM SERPL-MCNC: 1.5 MG/DL (ref 1.6–2.6)
MAGNESIUM SERPL-MCNC: 1.7 MG/DL (ref 1.6–2.6)
MCH RBC QN AUTO: 30.4 PG (ref 27–31)
MCHC RBC AUTO-ENTMCNC: 34.5 G/DL (ref 32–36)
MCV RBC AUTO: 88 FL (ref 82–98)
MICROSCOPIC COMMENT: ABNORMAL
NITRITE UR QL STRIP: NEGATIVE
NUCLEATED RBC (/100WBC) (SMH): 0 /100 WBC
PH UR STRIP: 7 [PH]
PHOSPHATE SERPL-MCNC: 2.4 MG/DL (ref 2.7–4.5)
PHOSPHATE SERPL-MCNC: 3.4 MG/DL (ref 2.7–4.5)
PLATELET # BLD AUTO: 226 K/UL (ref 150–450)
PMV BLD AUTO: 10 FL (ref 9.2–12.9)
POTASSIUM SERPL-SCNC: 3.5 MMOL/L (ref 3.5–5.1)
PROT SERPL-MCNC: 5.4 GM/DL (ref 6–8.4)
PROT UR QL STRIP: ABNORMAL
RBC # BLD AUTO: 3.88 M/UL (ref 4–5.4)
RBC #/AREA URNS AUTO: 7 /HPF
RELATIVE EOSINOPHIL (SMH): 1 % (ref 0–8)
RELATIVE LYMPHOCYTE (SMH): 57.1 % (ref 18–48)
RELATIVE MONOCYTE (SMH): 10 % (ref 4–15)
RELATIVE NEUTROPHIL (SMH): 30.9 % (ref 38–73)
SODIUM SERPL-SCNC: 141 MMOL/L (ref 136–145)
SP GR UR STRIP: >=1.03
SQUAMOUS #/AREA URNS AUTO: 6 /HPF
UROBILINOGEN UR STRIP-ACNC: NEGATIVE EU/DL
WBC # BLD AUTO: 5.08 K/UL (ref 3.9–12.7)
WBC #/AREA URNS AUTO: 2 /HPF

## 2025-08-30 PROCEDURE — G0378 HOSPITAL OBSERVATION PER HR: HCPCS

## 2025-08-30 PROCEDURE — 25000003 PHARM REV CODE 250: Performed by: NURSE PRACTITIONER

## 2025-08-30 PROCEDURE — 36415 COLL VENOUS BLD VENIPUNCTURE: CPT | Performed by: STUDENT IN AN ORGANIZED HEALTH CARE EDUCATION/TRAINING PROGRAM

## 2025-08-30 PROCEDURE — 25000003 PHARM REV CODE 250: Performed by: STUDENT IN AN ORGANIZED HEALTH CARE EDUCATION/TRAINING PROGRAM

## 2025-08-30 PROCEDURE — 36415 COLL VENOUS BLD VENIPUNCTURE: CPT | Performed by: NURSE PRACTITIONER

## 2025-08-30 PROCEDURE — 63600175 PHARM REV CODE 636 W HCPCS: Performed by: NURSE PRACTITIONER

## 2025-08-30 PROCEDURE — 81003 URINALYSIS AUTO W/O SCOPE: CPT | Performed by: STUDENT IN AN ORGANIZED HEALTH CARE EDUCATION/TRAINING PROGRAM

## 2025-08-30 PROCEDURE — 83735 ASSAY OF MAGNESIUM: CPT | Mod: 91 | Performed by: STUDENT IN AN ORGANIZED HEALTH CARE EDUCATION/TRAINING PROGRAM

## 2025-08-30 PROCEDURE — 84100 ASSAY OF PHOSPHORUS: CPT | Mod: 91 | Performed by: STUDENT IN AN ORGANIZED HEALTH CARE EDUCATION/TRAINING PROGRAM

## 2025-08-30 PROCEDURE — 83735 ASSAY OF MAGNESIUM: CPT | Performed by: NURSE PRACTITIONER

## 2025-08-30 PROCEDURE — 85025 COMPLETE CBC W/AUTO DIFF WBC: CPT | Performed by: NURSE PRACTITIONER

## 2025-08-30 PROCEDURE — 80053 COMPREHEN METABOLIC PANEL: CPT | Performed by: NURSE PRACTITIONER

## 2025-08-30 PROCEDURE — 63600175 PHARM REV CODE 636 W HCPCS: Performed by: STUDENT IN AN ORGANIZED HEALTH CARE EDUCATION/TRAINING PROGRAM

## 2025-08-30 PROCEDURE — 84100 ASSAY OF PHOSPHORUS: CPT | Performed by: NURSE PRACTITIONER

## 2025-08-30 RX ORDER — TRAZODONE HYDROCHLORIDE 100 MG/1
200 TABLET ORAL NIGHTLY
COMMUNITY
Start: 2025-08-28

## 2025-08-30 RX ORDER — ONDANSETRON HYDROCHLORIDE 2 MG/ML
8 INJECTION, SOLUTION INTRAVENOUS EVERY 6 HOURS PRN
Status: DISCONTINUED | OUTPATIENT
Start: 2025-08-30 | End: 2025-08-31 | Stop reason: HOSPADM

## 2025-08-30 RX ORDER — CYCLOSPORINE 0.5 MG/ML
1 EMULSION OPHTHALMIC 2 TIMES DAILY
COMMUNITY

## 2025-08-30 RX ORDER — KETOROLAC TROMETHAMINE 30 MG/ML
30 INJECTION, SOLUTION INTRAMUSCULAR; INTRAVENOUS ONCE
Status: COMPLETED | OUTPATIENT
Start: 2025-08-30 | End: 2025-08-30

## 2025-08-30 RX ORDER — GLUCAGON 1 MG
1 KIT INJECTION
Status: DISCONTINUED | OUTPATIENT
Start: 2025-08-30 | End: 2025-08-31 | Stop reason: HOSPADM

## 2025-08-30 RX ORDER — SODIUM CHLORIDE 0.9 % (FLUSH) 0.9 %
3 SYRINGE (ML) INJECTION EVERY 12 HOURS PRN
Status: DISCONTINUED | OUTPATIENT
Start: 2025-08-30 | End: 2025-08-31 | Stop reason: HOSPADM

## 2025-08-30 RX ORDER — IBUPROFEN 200 MG
24 TABLET ORAL
Status: DISCONTINUED | OUTPATIENT
Start: 2025-08-30 | End: 2025-08-31 | Stop reason: HOSPADM

## 2025-08-30 RX ORDER — SPIRONOLACTONE 25 MG/1
25 TABLET ORAL 2 TIMES DAILY
Status: DISCONTINUED | OUTPATIENT
Start: 2025-08-30 | End: 2025-08-31 | Stop reason: HOSPADM

## 2025-08-30 RX ORDER — NALOXONE HCL 0.4 MG/ML
0.02 VIAL (ML) INJECTION
Status: DISCONTINUED | OUTPATIENT
Start: 2025-08-30 | End: 2025-08-31 | Stop reason: HOSPADM

## 2025-08-30 RX ORDER — HYDROCODONE BITARTRATE AND ACETAMINOPHEN 5; 325 MG/1; MG/1
1 TABLET ORAL EVERY 6 HOURS PRN
Status: DISCONTINUED | OUTPATIENT
Start: 2025-08-30 | End: 2025-08-31 | Stop reason: HOSPADM

## 2025-08-30 RX ORDER — POTASSIUM CHLORIDE 20 MEQ/1
20 TABLET, EXTENDED RELEASE ORAL ONCE
Status: COMPLETED | OUTPATIENT
Start: 2025-08-30 | End: 2025-08-30

## 2025-08-30 RX ORDER — SODIUM,POTASSIUM PHOSPHATES 280-250MG
2 POWDER IN PACKET (EA) ORAL
Status: DISCONTINUED | OUTPATIENT
Start: 2025-08-30 | End: 2025-08-31 | Stop reason: HOSPADM

## 2025-08-30 RX ORDER — ACETAMINOPHEN 325 MG/1
650 TABLET ORAL EVERY 8 HOURS PRN
Status: DISCONTINUED | OUTPATIENT
Start: 2025-08-30 | End: 2025-08-31 | Stop reason: HOSPADM

## 2025-08-30 RX ORDER — MIRTAZAPINE 15 MG/1
15 TABLET, FILM COATED ORAL NIGHTLY
Status: DISCONTINUED | OUTPATIENT
Start: 2025-08-30 | End: 2025-08-31 | Stop reason: HOSPADM

## 2025-08-30 RX ORDER — DIPHENHYDRAMINE HYDROCHLORIDE 50 MG/ML
25 INJECTION, SOLUTION INTRAMUSCULAR; INTRAVENOUS ONCE
Status: COMPLETED | OUTPATIENT
Start: 2025-08-30 | End: 2025-08-30

## 2025-08-30 RX ORDER — SODIUM CHLORIDE, SODIUM LACTATE, POTASSIUM CHLORIDE, CALCIUM CHLORIDE 600; 310; 30; 20 MG/100ML; MG/100ML; MG/100ML; MG/100ML
INJECTION, SOLUTION INTRAVENOUS CONTINUOUS
Status: DISCONTINUED | OUTPATIENT
Start: 2025-08-30 | End: 2025-08-31

## 2025-08-30 RX ORDER — TALC
9 POWDER (GRAM) TOPICAL NIGHTLY PRN
Status: DISCONTINUED | OUTPATIENT
Start: 2025-08-30 | End: 2025-08-31 | Stop reason: HOSPADM

## 2025-08-30 RX ORDER — ALUMINUM HYDROXIDE, MAGNESIUM HYDROXIDE, AND SIMETHICONE 1200; 120; 1200 MG/30ML; MG/30ML; MG/30ML
30 SUSPENSION ORAL 4 TIMES DAILY PRN
Status: DISCONTINUED | OUTPATIENT
Start: 2025-08-30 | End: 2025-08-31 | Stop reason: HOSPADM

## 2025-08-30 RX ORDER — LANOLIN ALCOHOL/MO/W.PET/CERES
800 CREAM (GRAM) TOPICAL
Status: DISCONTINUED | OUTPATIENT
Start: 2025-08-30 | End: 2025-08-31 | Stop reason: HOSPADM

## 2025-08-30 RX ORDER — HYDROXYZINE HYDROCHLORIDE 25 MG/1
50 TABLET, FILM COATED ORAL NIGHTLY PRN
Status: DISCONTINUED | OUTPATIENT
Start: 2025-08-30 | End: 2025-08-31 | Stop reason: HOSPADM

## 2025-08-30 RX ORDER — SIMETHICONE 80 MG
1 TABLET,CHEWABLE ORAL 4 TIMES DAILY PRN
Status: DISCONTINUED | OUTPATIENT
Start: 2025-08-30 | End: 2025-08-31 | Stop reason: HOSPADM

## 2025-08-30 RX ORDER — MIRTAZAPINE 15 MG/1
15 TABLET, FILM COATED ORAL NIGHTLY
COMMUNITY
Start: 2025-08-28

## 2025-08-30 RX ORDER — IBUPROFEN 200 MG
16 TABLET ORAL
Status: DISCONTINUED | OUTPATIENT
Start: 2025-08-30 | End: 2025-08-31 | Stop reason: HOSPADM

## 2025-08-30 RX ORDER — ACETAMINOPHEN 325 MG/1
650 TABLET ORAL EVERY 4 HOURS PRN
Status: DISCONTINUED | OUTPATIENT
Start: 2025-08-30 | End: 2025-08-31 | Stop reason: HOSPADM

## 2025-08-30 RX ORDER — METOCLOPRAMIDE HYDROCHLORIDE 5 MG/ML
5 INJECTION INTRAMUSCULAR; INTRAVENOUS ONCE
Status: DISCONTINUED | OUTPATIENT
Start: 2025-08-30 | End: 2025-08-30

## 2025-08-30 RX ORDER — HYDROMORPHONE HYDROCHLORIDE 1 MG/ML
1 INJECTION, SOLUTION INTRAMUSCULAR; INTRAVENOUS; SUBCUTANEOUS EVERY 4 HOURS PRN
Status: DISCONTINUED | OUTPATIENT
Start: 2025-08-30 | End: 2025-08-31 | Stop reason: HOSPADM

## 2025-08-30 RX ORDER — DROPERIDOL 2.5 MG/ML
0.62 INJECTION, SOLUTION INTRAMUSCULAR; INTRAVENOUS ONCE
Status: COMPLETED | OUTPATIENT
Start: 2025-08-30 | End: 2025-08-30

## 2025-08-30 RX ORDER — TRAZODONE HYDROCHLORIDE 50 MG/1
300 TABLET ORAL NIGHTLY
Status: DISCONTINUED | OUTPATIENT
Start: 2025-08-30 | End: 2025-08-31 | Stop reason: HOSPADM

## 2025-08-30 RX ORDER — TOPIRAMATE 100 MG/1
200 TABLET, FILM COATED ORAL DAILY
Status: DISCONTINUED | OUTPATIENT
Start: 2025-08-30 | End: 2025-08-31 | Stop reason: HOSPADM

## 2025-08-30 RX ADMIN — Medication 2 PACKET: at 12:08

## 2025-08-30 RX ADMIN — ONDANSETRON 8 MG: 2 INJECTION INTRAMUSCULAR; INTRAVENOUS at 01:08

## 2025-08-30 RX ADMIN — SODIUM CHLORIDE, POTASSIUM CHLORIDE, SODIUM LACTATE AND CALCIUM CHLORIDE: 600; 310; 30; 20 INJECTION, SOLUTION INTRAVENOUS at 10:08

## 2025-08-30 RX ADMIN — Medication 2 PACKET: at 09:08

## 2025-08-30 RX ADMIN — APIXABAN 5 MG: 2.5 TABLET, FILM COATED ORAL at 09:08

## 2025-08-30 RX ADMIN — Medication 800 MG: at 09:08

## 2025-08-30 RX ADMIN — POTASSIUM CHLORIDE 20 MEQ: 1500 TABLET, EXTENDED RELEASE ORAL at 10:08

## 2025-08-30 RX ADMIN — SPIRONOLACTONE 25 MG: 25 TABLET ORAL at 09:08

## 2025-08-30 RX ADMIN — TOPIRAMATE 200 MG: 100 TABLET, FILM COATED ORAL at 09:08

## 2025-08-30 RX ADMIN — SODIUM CHLORIDE, POTASSIUM CHLORIDE, SODIUM LACTATE AND CALCIUM CHLORIDE: 600; 310; 30; 20 INJECTION, SOLUTION INTRAVENOUS at 02:08

## 2025-08-30 RX ADMIN — HYDROMORPHONE HYDROCHLORIDE 1 MG: 1 INJECTION, SOLUTION INTRAMUSCULAR; INTRAVENOUS; SUBCUTANEOUS at 06:08

## 2025-08-30 RX ADMIN — DIPHENHYDRAMINE HYDROCHLORIDE 25 MG: 50 INJECTION INTRAMUSCULAR; INTRAVENOUS at 12:08

## 2025-08-30 RX ADMIN — ONDANSETRON 8 MG: 2 INJECTION INTRAMUSCULAR; INTRAVENOUS at 10:08

## 2025-08-30 RX ADMIN — TRAZODONE HYDROCHLORIDE 300 MG: 50 TABLET ORAL at 09:08

## 2025-08-30 RX ADMIN — DROPERIDOL 0.62 MG: 2.5 INJECTION, SOLUTION INTRAMUSCULAR; INTRAVENOUS at 01:08

## 2025-08-30 RX ADMIN — KETOROLAC TROMETHAMINE 30 MG: 30 INJECTION, SOLUTION INTRAMUSCULAR at 12:08

## 2025-08-30 RX ADMIN — SODIUM CHLORIDE, POTASSIUM CHLORIDE, SODIUM LACTATE AND CALCIUM CHLORIDE: 600; 310; 30; 20 INJECTION, SOLUTION INTRAVENOUS at 12:08

## 2025-08-30 RX ADMIN — ACETAMINOPHEN 650 MG: 325 TABLET ORAL at 11:08

## 2025-08-30 RX ADMIN — Medication 800 MG: at 01:08

## 2025-08-31 VITALS
DIASTOLIC BLOOD PRESSURE: 59 MMHG | OXYGEN SATURATION: 99 % | WEIGHT: 121.25 LBS | RESPIRATION RATE: 14 BRPM | HEIGHT: 64 IN | HEART RATE: 83 BPM | TEMPERATURE: 98 F | SYSTOLIC BLOOD PRESSURE: 97 MMHG | BODY MASS INDEX: 20.7 KG/M2

## 2025-08-31 PROBLEM — E87.6 HYPOKALEMIA: Status: RESOLVED | Noted: 2025-08-30 | Resolved: 2025-08-31

## 2025-08-31 LAB
ANION GAP (SMH): 3 MMOL/L (ref 8–16)
BUN SERPL-MCNC: 5 MG/DL (ref 6–20)
CALCIUM SERPL-MCNC: 8.1 MG/DL (ref 8.7–10.5)
CHLORIDE SERPL-SCNC: 114 MMOL/L (ref 95–110)
CO2 SERPL-SCNC: 25 MMOL/L (ref 23–29)
CREAT SERPL-MCNC: 0.9 MG/DL (ref 0.5–1.4)
ERYTHROCYTE [DISTWIDTH] IN BLOOD BY AUTOMATED COUNT: 15.9 % (ref 11.5–14.5)
GFR SERPLBLD CREATININE-BSD FMLA CKD-EPI: >60 ML/MIN/1.73/M2
GLUCOSE SERPL-MCNC: 93 MG/DL (ref 70–110)
HCT VFR BLD AUTO: 32 % (ref 37–48.5)
HGB BLD-MCNC: 11.3 GM/DL (ref 12–16)
MAGNESIUM SERPL-MCNC: 1.7 MG/DL (ref 1.6–2.6)
MCH RBC QN AUTO: 31.7 PG (ref 27–31)
MCHC RBC AUTO-ENTMCNC: 35.3 G/DL (ref 32–36)
MCV RBC AUTO: 90 FL (ref 82–98)
PHOSPHATE SERPL-MCNC: 3.5 MG/DL (ref 2.7–4.5)
PLATELET # BLD AUTO: 205 K/UL (ref 150–450)
PMV BLD AUTO: 9.7 FL (ref 9.2–12.9)
POTASSIUM SERPL-SCNC: 3.8 MMOL/L (ref 3.5–5.1)
RBC # BLD AUTO: 3.56 M/UL (ref 4–5.4)
SODIUM SERPL-SCNC: 142 MMOL/L (ref 136–145)
WBC # BLD AUTO: 5.19 K/UL (ref 3.9–12.7)

## 2025-08-31 PROCEDURE — 83735 ASSAY OF MAGNESIUM: CPT | Performed by: NURSE PRACTITIONER

## 2025-08-31 PROCEDURE — G0378 HOSPITAL OBSERVATION PER HR: HCPCS

## 2025-08-31 PROCEDURE — 36415 COLL VENOUS BLD VENIPUNCTURE: CPT | Performed by: STUDENT IN AN ORGANIZED HEALTH CARE EDUCATION/TRAINING PROGRAM

## 2025-08-31 PROCEDURE — 85027 COMPLETE CBC AUTOMATED: CPT | Performed by: STUDENT IN AN ORGANIZED HEALTH CARE EDUCATION/TRAINING PROGRAM

## 2025-08-31 PROCEDURE — 25000003 PHARM REV CODE 250: Performed by: NURSE PRACTITIONER

## 2025-08-31 PROCEDURE — 80048 BASIC METABOLIC PNL TOTAL CA: CPT | Performed by: STUDENT IN AN ORGANIZED HEALTH CARE EDUCATION/TRAINING PROGRAM

## 2025-08-31 PROCEDURE — 84100 ASSAY OF PHOSPHORUS: CPT | Performed by: NURSE PRACTITIONER

## 2025-08-31 RX ORDER — ONDANSETRON 4 MG/1
4 TABLET, ORALLY DISINTEGRATING ORAL EVERY 6 HOURS PRN
Qty: 30 TABLET | Refills: 0 | Status: SHIPPED | OUTPATIENT
Start: 2025-08-31

## 2025-08-31 RX ORDER — ERYTHROMYCIN 250 MG/1
250 TABLET, DELAYED RELEASE ORAL
Qty: 42 TABLET | Refills: 0 | Status: SHIPPED | OUTPATIENT
Start: 2025-08-31 | End: 2025-09-14

## 2025-08-31 RX ADMIN — TOPIRAMATE 200 MG: 100 TABLET, FILM COATED ORAL at 08:08

## 2025-09-01 LAB
OHS QRS DURATION: 68 MS
OHS QTC CALCULATION: 457 MS

## 2025-09-02 LAB
HOLD SPECIMEN: NORMAL
HOLD SPECIMEN: NORMAL

## 2025-09-05 ENCOUNTER — OFFICE VISIT (OUTPATIENT)
Facility: CLINIC | Age: 45
End: 2025-09-05
Payer: MEDICAID

## 2025-09-05 VITALS
RESPIRATION RATE: 18 BRPM | WEIGHT: 115.13 LBS | HEART RATE: 99 BPM | BODY MASS INDEX: 19.65 KG/M2 | OXYGEN SATURATION: 100 % | HEIGHT: 64 IN | TEMPERATURE: 98 F

## 2025-09-05 DIAGNOSIS — Z15.89 MTHFR MUTATION: ICD-10-CM

## 2025-09-05 DIAGNOSIS — D50.8 OTHER IRON DEFICIENCY ANEMIA: ICD-10-CM

## 2025-09-05 DIAGNOSIS — Z86.711 HISTORY OF PULMONARY EMBOLISM: ICD-10-CM

## 2025-09-05 DIAGNOSIS — D68.59 ANTITHROMBIN III DEFICIENCY: Primary | ICD-10-CM

## 2025-09-05 DIAGNOSIS — E53.8 B12 DEFICIENCY: ICD-10-CM

## 2025-09-05 PROCEDURE — 99214 OFFICE O/P EST MOD 30 MIN: CPT | Mod: PBBFAC,PN | Performed by: INTERNAL MEDICINE

## 2025-09-05 PROCEDURE — 99999 PR PBB SHADOW E&M-EST. PATIENT-LVL IV: CPT | Mod: PBBFAC,,, | Performed by: INTERNAL MEDICINE
